# Patient Record
Sex: FEMALE | Employment: OTHER | URBAN - METROPOLITAN AREA
[De-identification: names, ages, dates, MRNs, and addresses within clinical notes are randomized per-mention and may not be internally consistent; named-entity substitution may affect disease eponyms.]

---

## 2017-09-13 PROCEDURE — 99283 EMERGENCY DEPT VISIT LOW MDM: CPT | Performed by: EMERGENCY MEDICINE

## 2017-09-13 PROCEDURE — 81003 URINALYSIS AUTO W/O SCOPE: CPT | Performed by: EMERGENCY MEDICINE

## 2017-09-14 ENCOUNTER — HOSPITAL ENCOUNTER (EMERGENCY)
Age: 77
Discharge: HOME | End: 2017-09-14
Attending: EMERGENCY MEDICINE
Payer: MEDICARE

## 2017-09-14 VITALS
HEART RATE: 110 BPM | TEMPERATURE: 98.1 F | RESPIRATION RATE: 18 BRPM | WEIGHT: 220 LBS | DIASTOLIC BLOOD PRESSURE: 73 MMHG | HEIGHT: 64 IN | SYSTOLIC BLOOD PRESSURE: 158 MMHG | OXYGEN SATURATION: 97 % | BODY MASS INDEX: 37.56 KG/M2

## 2017-09-14 DIAGNOSIS — N30.01 ACUTE CYSTITIS WITH HEMATURIA: Primary | ICD-10-CM

## 2017-09-14 LAB
AMORPH CRY URNS QL MICRO: ABNORMAL
APPEARANCE UR: ABNORMAL
BACTERIA URNS QL MICRO: ABNORMAL /HPF
BILIRUB UR QL: ABNORMAL
COLOR UR: ABNORMAL
EPI CELLS #/AREA URNS HPF: ABNORMAL /HPF
GLUCOSE UR STRIP.AUTO-MCNC: NEGATIVE MG/DL
HGB UR QL STRIP: NEGATIVE
KETONES UR QL STRIP.AUTO: 80 MG/DL
LEUKOCYTE ESTERASE UR QL STRIP.AUTO: ABNORMAL
NITRITE UR QL STRIP.AUTO: POSITIVE
PH UR STRIP: 5 [PH] (ref 5–9)
PROT UR STRIP-MCNC: 30 MG/DL
RBC #/AREA URNS HPF: ABNORMAL /HPF
SP GR UR REFRACTOMETRY: 1.03 (ref 1–1.02)
UROBILINOGEN UR QL STRIP.AUTO: 4 EU/DL (ref 0.2–1)
WBC URNS QL MICRO: ABNORMAL /HPF

## 2017-09-14 PROCEDURE — 74011250637 HC RX REV CODE- 250/637: Performed by: EMERGENCY MEDICINE

## 2017-09-14 RX ORDER — PROMETHAZINE HYDROCHLORIDE 25 MG/1
25 TABLET ORAL
Qty: 12 TAB | Refills: 0 | Status: SHIPPED | OUTPATIENT
Start: 2017-09-14 | End: 2017-11-10

## 2017-09-14 RX ORDER — CEPHALEXIN 500 MG/1
500 CAPSULE ORAL
Status: COMPLETED | OUTPATIENT
Start: 2017-09-14 | End: 2017-09-14

## 2017-09-14 RX ORDER — CEPHALEXIN 500 MG/1
500 CAPSULE ORAL 3 TIMES DAILY
Qty: 21 CAP | Refills: 0 | Status: SHIPPED | OUTPATIENT
Start: 2017-09-14 | End: 2017-10-10

## 2017-09-14 RX ADMIN — CEPHALEXIN 500 MG: 500 CAPSULE ORAL at 02:07

## 2017-09-14 NOTE — ED PROVIDER NOTES
HPI Comments: Patient states she has been having lower abdominal pain for the past couple weeks. She's had some mild nausea with no vomiting, denies any fever. .  Her pain is worse when she urinates and she \"feels a pulling down there\". She denies any hematuria. She has been taking  Some over-the-counter Azo without any improvement in her symptoms. She saw her primary doctor who did not feel that she had an infection. Elements of this note were created using speech recognition software. As such, errors of speech recognition may be present. Patient is a 68 y.o. female presenting with abdominal pain. The history is provided by the patient. Abdominal Pain    Associated symptoms include nausea. Pertinent negatives include no fever and no vomiting. Past Medical History:   Diagnosis Date    Anxiety state, unspecified     GERD (gastroesophageal reflux disease)     Hypercholesterolemia        Past Surgical History:   Procedure Laterality Date    HX CHOLECYSTECTOMY      HX COLONOSCOPY      Dr Lyssa Dawson 2012, repeat 5 years    HX GYN      hysterectomy, partial    HX KNEE REPLACEMENT      right         Family History:   Problem Relation Age of Onset    Diabetes Mother     Heart Disease Mother     Thyroid Disease Mother     Heart Disease Father        Social History     Social History    Marital status:      Spouse name: N/A    Number of children: N/A    Years of education: N/A     Occupational History    Not on file. Social History Main Topics    Smoking status: Former Smoker     Quit date: 1/1/1975    Smokeless tobacco: Never Used    Alcohol use No    Drug use: No    Sexual activity: Not on file     Other Topics Concern    Not on file     Social History Narrative         ALLERGIES: Morphine    Review of Systems   Constitutional: Negative for chills and fever. Gastrointestinal: Positive for abdominal pain and nausea. Negative for vomiting.    All other systems reviewed and are negative. Vitals:    09/13/17 2318   BP: 156/81   Pulse: 97   Temp: 98 °F (36.7 °C)   SpO2: 96%   Weight: 99.8 kg (220 lb)   Height: 5' 4\" (1.626 m)            Physical Exam   Constitutional: She is oriented to person, place, and time. She appears well-developed and well-nourished. HENT:   Head: Normocephalic and atraumatic. Eyes: Conjunctivae are normal. Pupils are equal, round, and reactive to light. Neck: Normal range of motion. Neck supple. Cardiovascular: Normal rate and regular rhythm. Pulmonary/Chest: Effort normal and breath sounds normal.   Abdominal: Soft. Bowel sounds are normal. There is tenderness. Mild tenderness to palpation lower abdomen as indicated   Musculoskeletal: She exhibits no edema or tenderness. Neurological: She is alert and oriented to person, place, and time. Skin: Skin is warm and dry. Psychiatric: She has a normal mood and affect. Her behavior is normal.   Nursing note and vitals reviewed.        MDM  Number of Diagnoses or Management Options  Acute cystitis with hematuria: new and does not require workup  Diagnosis management comments: Differential sinuses: UTI, pyelonephritis, renal colic  3:52 AM discussed results with patient, need for antibiotics       Amount and/or Complexity of Data Reviewed  Clinical lab tests: ordered and reviewed    Risk of Complications, Morbidity, and/or Mortality  Presenting problems: moderate  Diagnostic procedures: moderate  Management options: moderate    Patient Progress  Patient progress: improved    ED Course       Procedures

## 2017-09-14 NOTE — DISCHARGE INSTRUCTIONS
Urinary Tract Infection in Women: Care Instructions  Your Care Instructions    A urinary tract infection, or UTI, is a general term for an infection anywhere between the kidneys and the urethra (where urine comes out). Most UTIs are bladder infections. They often cause pain or burning when you urinate. UTIs are caused by bacteria and can be cured with antibiotics. Be sure to complete your treatment so that the infection goes away. Follow-up care is a key part of your treatment and safety. Be sure to make and go to all appointments, and call your doctor if you are having problems. It's also a good idea to know your test results and keep a list of the medicines you take. How can you care for yourself at home? · Take your antibiotics as directed. Do not stop taking them just because you feel better. You need to take the full course of antibiotics. · Drink extra water and other fluids for the next day or two. This may help wash out the bacteria that are causing the infection. (If you have kidney, heart, or liver disease and have to limit fluids, talk with your doctor before you increase your fluid intake.)  · Avoid drinks that are carbonated or have caffeine. They can irritate the bladder. · Urinate often. Try to empty your bladder each time. · To relieve pain, take a hot bath or lay a heating pad set on low over your lower belly or genital area. Never go to sleep with a heating pad in place. To prevent UTIs  · Drink plenty of water each day. This helps you urinate often, which clears bacteria from your system. (If you have kidney, heart, or liver disease and have to limit fluids, talk with your doctor before you increase your fluid intake.)  · Urinate when you need to. · Urinate right after you have sex. · Change sanitary pads often. · Avoid douches, bubble baths, feminine hygiene sprays, and other feminine hygiene products that have deodorants.   · After going to the bathroom, wipe from front to back.  When should you call for help? Call your doctor now or seek immediate medical care if:  · Symptoms such as fever, chills, nausea, or vomiting get worse or appear for the first time. · You have new pain in your back just below your rib cage. This is called flank pain. · There is new blood or pus in your urine. · You have any problems with your antibiotic medicine. Watch closely for changes in your health, and be sure to contact your doctor if:  · You are not getting better after taking an antibiotic for 2 days. · Your symptoms go away but then come back. Where can you learn more? Go to http://dharmesh-lisbeth.info/. Enter D151 in the search box to learn more about \"Urinary Tract Infection in Women: Care Instructions. \"  Current as of: November 28, 2016  Content Version: 11.3  © 1385-3433 [employer], [employer]. Care instructions adapted under license by [employer] (which disclaims liability or warranty for this information). If you have questions about a medical condition or this instruction, always ask your healthcare professional. [address] any warranty or liability for your use of this information.

## 2017-09-15 ENCOUNTER — PATIENT OUTREACH (OUTPATIENT)
Dept: CASE MANAGEMENT | Age: 77
End: 2017-09-15

## 2017-09-15 NOTE — PROGRESS NOTES
Initial outreach attempt to patient was unsuccessful. Second outreach attempt will be made within 24 hours. This note will not be viewable in [address]

## 2017-09-18 ENCOUNTER — APPOINTMENT (OUTPATIENT)
Dept: CT IMAGING | Age: 77
DRG: 330 | End: 2017-09-18
Payer: MEDICARE

## 2017-09-18 ENCOUNTER — HOSPITAL ENCOUNTER (EMERGENCY)
Age: 77
Discharge: HOME | DRG: 330 | End: 2017-09-18
Attending: EMERGENCY MEDICINE
Payer: MEDICARE

## 2017-09-18 ENCOUNTER — HOSPITAL ENCOUNTER (INPATIENT)
Age: 77
LOS: 22 days | Discharge: HOME HEALTH | DRG: 330 | End: 2017-10-10
Attending: SURGERY | Admitting: SURGERY
Payer: MEDICARE

## 2017-09-18 VITALS
RESPIRATION RATE: 16 BRPM | HEART RATE: 78 BPM | BODY MASS INDEX: 37.56 KG/M2 | SYSTOLIC BLOOD PRESSURE: 137 MMHG | WEIGHT: 220 LBS | HEIGHT: 64 IN | DIASTOLIC BLOOD PRESSURE: 68 MMHG | TEMPERATURE: 98.1 F | OXYGEN SATURATION: 95 %

## 2017-09-18 DIAGNOSIS — F32.A DEPRESSION, UNSPECIFIED DEPRESSION TYPE: ICD-10-CM

## 2017-09-18 DIAGNOSIS — R19.00 ABDOMINAL MASS, UNSPECIFIED LOCATION: Primary | ICD-10-CM

## 2017-09-18 PROBLEM — B99.9 INTRA-ABDOMINAL INFECTION: Status: ACTIVE | Noted: 2017-09-18

## 2017-09-18 LAB
ALBUMIN SERPL-MCNC: 3 G/DL (ref 3.2–4.6)
ALBUMIN/GLOB SERPL: 0.7 {RATIO} (ref 1.2–3.5)
ALP SERPL-CCNC: 76 U/L (ref 50–136)
ALT SERPL-CCNC: 15 U/L (ref 12–65)
ANION GAP SERPL CALC-SCNC: 4 MMOL/L (ref 7–16)
AST SERPL-CCNC: 10 U/L (ref 15–37)
BACTERIA URNS QL MICRO: NORMAL /HPF
BASOPHILS # BLD: 0 K/UL (ref 0–0.2)
BASOPHILS NFR BLD: 0 % (ref 0–2)
BILIRUB SERPL-MCNC: 0.3 MG/DL (ref 0.2–1.1)
BUN SERPL-MCNC: 9 MG/DL (ref 8–23)
CALCIUM SERPL-MCNC: 8.5 MG/DL (ref 8.3–10.4)
CANCER AG125 SERPL-ACNC: 5 U/ML (ref 1.5–35)
CASTS URNS QL MICRO: 0 /LPF
CEA SERPL-MCNC: 0.8 NG/ML (ref 0–3)
CHLORIDE SERPL-SCNC: 102 MMOL/L (ref 98–107)
CO2 SERPL-SCNC: 30 MMOL/L (ref 21–32)
CREAT SERPL-MCNC: 0.79 MG/DL (ref 0.6–1)
CRYSTALS URNS QL MICRO: 0 /LPF
DIFFERENTIAL METHOD BLD: ABNORMAL
EOSINOPHIL # BLD: 0.1 K/UL (ref 0–0.8)
EOSINOPHIL NFR BLD: 1 % (ref 0.5–7.8)
EPI CELLS #/AREA URNS HPF: NORMAL /HPF
ERYTHROCYTE [DISTWIDTH] IN BLOOD BY AUTOMATED COUNT: 16.1 % (ref 11.9–14.6)
GLOBULIN SER CALC-MCNC: 4.2 G/DL (ref 2.3–3.5)
GLUCOSE SERPL-MCNC: 113 MG/DL (ref 65–100)
HCT VFR BLD AUTO: 39.6 % (ref 35.8–46.3)
HGB BLD-MCNC: 11.9 G/DL (ref 11.7–15.4)
IMM GRANULOCYTES # BLD: 0 K/UL (ref 0–0.5)
IMM GRANULOCYTES NFR BLD: 0.2 % (ref 0–5)
LYMPHOCYTES # BLD: 1.9 K/UL (ref 0.5–4.6)
LYMPHOCYTES NFR BLD: 15 % (ref 13–44)
MCH RBC QN AUTO: 24.7 PG (ref 26.1–32.9)
MCHC RBC AUTO-ENTMCNC: 30.1 G/DL (ref 31.4–35)
MCV RBC AUTO: 82.3 FL (ref 79.6–97.8)
MONOCYTES # BLD: 0.7 K/UL (ref 0.1–1.3)
MONOCYTES NFR BLD: 6 % (ref 4–12)
MUCOUS THREADS URNS QL MICRO: 0 /LPF
NEUTS SEG # BLD: 9.7 K/UL (ref 1.7–8.2)
NEUTS SEG NFR BLD: 78 % (ref 43–78)
PLATELET # BLD AUTO: 389 K/UL (ref 150–450)
PMV BLD AUTO: 9.8 FL (ref 10.8–14.1)
POTASSIUM SERPL-SCNC: 4.1 MMOL/L (ref 3.5–5.1)
PROT SERPL-MCNC: 7.2 G/DL (ref 6.3–8.2)
RBC # BLD AUTO: 4.81 M/UL (ref 4.05–5.25)
RBC #/AREA URNS HPF: 0 /HPF
SODIUM SERPL-SCNC: 136 MMOL/L (ref 136–145)
WBC # BLD AUTO: 12.4 K/UL (ref 4.3–11.1)
WBC URNS QL MICRO: NORMAL /HPF

## 2017-09-18 PROCEDURE — 74011250637 HC RX REV CODE- 250/637: Performed by: SURGERY

## 2017-09-18 PROCEDURE — 65270000029 HC RM PRIVATE

## 2017-09-18 PROCEDURE — 74011000258 HC RX REV CODE- 258: Performed by: SURGERY

## 2017-09-18 PROCEDURE — 74011000250 HC RX REV CODE- 250: Performed by: SURGERY

## 2017-09-18 PROCEDURE — 74011250636 HC RX REV CODE- 250/636: Performed by: SURGERY

## 2017-09-18 RX ORDER — FAMOTIDINE 10 MG/ML
20 INJECTION INTRAVENOUS EVERY 12 HOURS
Status: DISCONTINUED | OUTPATIENT
Start: 2017-09-18 | End: 2017-09-20

## 2017-09-18 RX ORDER — HYDROMORPHONE HYDROCHLORIDE 1 MG/ML
0.5 INJECTION, SOLUTION INTRAMUSCULAR; INTRAVENOUS; SUBCUTANEOUS
Status: COMPLETED | OUTPATIENT
Start: 2017-09-18 | End: 2017-09-18

## 2017-09-18 RX ORDER — ACETAMINOPHEN 500 MG
1000 TABLET ORAL
Status: DISCONTINUED | OUTPATIENT
Start: 2017-09-18 | End: 2017-10-03

## 2017-09-18 RX ORDER — SODIUM CHLORIDE 0.9 % (FLUSH) 0.9 %
5-10 SYRINGE (ML) INJECTION AS NEEDED
Status: DISCONTINUED | OUTPATIENT
Start: 2017-09-18 | End: 2017-09-18 | Stop reason: HOSPADM

## 2017-09-18 RX ORDER — ONDANSETRON 2 MG/ML
4 INJECTION INTRAMUSCULAR; INTRAVENOUS
Status: COMPLETED | OUTPATIENT
Start: 2017-09-18 | End: 2017-09-18

## 2017-09-18 RX ORDER — OXYCODONE HYDROCHLORIDE 5 MG/1
5 TABLET ORAL
Status: DISCONTINUED | OUTPATIENT
Start: 2017-09-18 | End: 2017-10-03

## 2017-09-18 RX ORDER — DEXTROSE, SODIUM CHLORIDE, AND POTASSIUM CHLORIDE 5; .45; .15 G/100ML; G/100ML; G/100ML
110 INJECTION INTRAVENOUS CONTINUOUS
Status: DISCONTINUED | OUTPATIENT
Start: 2017-09-18 | End: 2017-09-20

## 2017-09-18 RX ORDER — OXYCODONE HYDROCHLORIDE 5 MG/1
5-10 TABLET ORAL
Status: DISCONTINUED | OUTPATIENT
Start: 2017-09-18 | End: 2017-09-18 | Stop reason: SDUPTHER

## 2017-09-18 RX ORDER — SODIUM CHLORIDE 0.9 % (FLUSH) 0.9 %
10 SYRINGE (ML) INJECTION
Status: COMPLETED | OUTPATIENT
Start: 2017-09-18 | End: 2017-09-18

## 2017-09-18 RX ORDER — SODIUM CHLORIDE 0.9 % (FLUSH) 0.9 %
5-10 SYRINGE (ML) INJECTION EVERY 8 HOURS
Status: DISCONTINUED | OUTPATIENT
Start: 2017-09-18 | End: 2017-09-18 | Stop reason: HOSPADM

## 2017-09-18 RX ORDER — SODIUM CHLORIDE 9 MG/ML
125 INJECTION, SOLUTION INTRAVENOUS CONTINUOUS
Status: DISCONTINUED | OUTPATIENT
Start: 2017-09-18 | End: 2017-09-18 | Stop reason: HOSPADM

## 2017-09-18 RX ORDER — ENOXAPARIN SODIUM 100 MG/ML
40 INJECTION SUBCUTANEOUS EVERY 24 HOURS
Status: DISCONTINUED | OUTPATIENT
Start: 2017-09-19 | End: 2017-09-21

## 2017-09-18 RX ORDER — OXYCODONE HYDROCHLORIDE 5 MG/1
10 TABLET ORAL
Status: DISCONTINUED | OUTPATIENT
Start: 2017-09-18 | End: 2017-10-03

## 2017-09-18 RX ADMIN — SODIUM CHLORIDE 100 ML: 900 INJECTION, SOLUTION INTRAVENOUS at 13:09

## 2017-09-18 RX ADMIN — ONDANSETRON 4 MG: 2 INJECTION INTRAMUSCULAR; INTRAVENOUS at 11:49

## 2017-09-18 RX ADMIN — CEFTRIAXONE 1 G: 1 INJECTION, POWDER, FOR SOLUTION INTRAMUSCULAR; INTRAVENOUS at 12:34

## 2017-09-18 RX ADMIN — DEXTROSE MONOHYDRATE, SODIUM CHLORIDE, AND POTASSIUM CHLORIDE 110 ML/HR: 50; 4.5; 1.49 INJECTION, SOLUTION INTRAVENOUS at 19:00

## 2017-09-18 RX ADMIN — HYDROMORPHONE HYDROCHLORIDE 0.5 MG: 1 INJECTION, SOLUTION INTRAMUSCULAR; INTRAVENOUS; SUBCUTANEOUS at 12:34

## 2017-09-18 RX ADMIN — OXYCODONE HYDROCHLORIDE 5 MG: 5 TABLET ORAL at 19:55

## 2017-09-18 RX ADMIN — PIPERACILLIN SODIUM,TAZOBACTAM SODIUM 3.38 G: 3; .375 INJECTION, POWDER, FOR SOLUTION INTRAVENOUS at 23:23

## 2017-09-18 RX ADMIN — PIPERACILLIN SODIUM,TAZOBACTAM SODIUM 4.5 G: 4; .5 INJECTION, POWDER, FOR SOLUTION INTRAVENOUS at 15:05

## 2017-09-18 RX ADMIN — SODIUM CHLORIDE 1000 ML: 900 INJECTION, SOLUTION INTRAVENOUS at 11:49

## 2017-09-18 RX ADMIN — SODIUM CHLORIDE 125 ML/HR: 900 INJECTION, SOLUTION INTRAVENOUS at 15:57

## 2017-09-18 RX ADMIN — FAMOTIDINE 20 MG: 10 INJECTION, SOLUTION INTRAVENOUS at 21:30

## 2017-09-18 RX ADMIN — IOPAMIDOL 100 ML: 755 INJECTION, SOLUTION INTRAVENOUS at 13:09

## 2017-09-18 RX ADMIN — Medication 10 ML: at 13:09

## 2017-09-18 NOTE — PROGRESS NOTES
TRANSFER - IN REPORT:    Verbal report received from [facility] RN(name) on Manju Barrios  being received from [facility]) for routine progression of care      Report consisted of patients Situation, Background, Assessment and   Recommendations(SBAR). Information from the following report(s) Kardex was reviewed with the receiving nurse. Opportunity for questions and clarification was provided. Assessment completed upon patients arrival to unit and care assumed. Oriented to nurse call light system and changed into hospital gown. Instructed to call with needs.

## 2017-09-18 NOTE — H&P
H&P/Consult Note/Progress Note/Office Note:   James Sellers  MRN: [id number]  :1940  Age:76 y.o.    HPI: James Sellers is a 68 y.o. female who reports a 1 month h/o RLQ pain. This has been worsening and is associated with intermittent N/V. It is moderate in intensity  Nothing makes it better or worse. She was treated previously with laxatives without imp[rovement. She came the [facility] ER about 5 days ago and was treated with antibiotics for UTI. No imaging was done. She came back to the ER today and CT obtained as shown below. She is s/p lap robbi at [facility] approx . She is s/p hysterectomy about 40 yrs ago for benign fibroids. She thinks her ovaries remain. She denies bloody BMs or diarrhea.          17 CT abd/pelvis with contrast with IV but not oral contrast   Minimal linear atelectasis or scarring is present in the lung bases,  left greater than right. There is a small to moderate-sized hiatal hernia. No  pleural or pericardial fluid.     Small hypoattenuating lesions are present in the right hepatic lobe and left  kidney, too small to definitively characterize but statistically likely  representing cysts. There is central biliary ductal ectasia status post  cholecystectomy.     The remaining solid abdominal viscera are unremarkable to the limits of  noncontrast CT. Atherosclerotic abdominal aorta and branch vessels. No upper  abdominal no retroperitoneal lymph node enlargement. Nonopacified small bowel  loops are normal in caliber.     Pelvis: There is somewhat prominent lipomatosis along the left posterior  pararenal fascia and iliopsoas without associated soft tissue component,  measuring up to 12.0 x 23 x 9 cm. The bladder is predominantly decompressed. Uterus not visualized.     Extensive diverticular changes are present in the colon which is otherwise  normal in caliber.  There is a large ill-defined inflammatory process in the  right lower quadrant measuring 5.9 x 10.0 x 6.4 cm approximating the ileocecal  junction from which the appendix is not visualized as a distinctly separate  structure. Multiple high attenuation foci are present within or adjacent to the  bowel, suspicious for medication and/or enteroliths. There is moderate adjacent  inflammatory fat stranding and multiple prominent right lower quadrant  mesenteric lymph nodes, the largest of which measures 1.2 cm in short axis.     Trace dependent free pelvic fluid. No aggressive osseous lesions.     IMPRESSION:     1. Nonspecific inflammatory process in the right lower quadrant at the ileocecal  junction. Differential considerations include infectious, and ischemic colitis,  appendicitis, inflammatory bowel disease, omental infarct. Surgical consultation  recommended.     2. Large extraperitoneal predominantly fatty attenuation mass without definite  enhancing soft tissue. Size places the patient at risk for sarcoma, the inferior  extent of which is not included in the field of view.                 Past Medical History:   Diagnosis Date    Anxiety state, unspecified     GERD (gastroesophageal reflux disease)     Hypercholesterolemia      Past Surgical History:   Procedure Laterality Date    HX CHOLECYSTECTOMY      HX COLONOSCOPY      Dr Lis Mg 2012, repeat 5 years    HX GYN      hysterectomy, partial    HX KNEE REPLACEMENT      right     Current Facility-Administered Medications   Medication Dose Route Frequency    sodium chloride (NS) flush 5-10 mL  5-10 mL IntraVENous Q8H    sodium chloride (NS) flush 5-10 mL  5-10 mL IntraVENous PRN     Current Outpatient Prescriptions   Medication Sig    cephALEXin (KEFLEX) 500 mg capsule Take 1 Cap by mouth three (3) times daily for 7 days.  promethazine (PHENERGAN) 25 mg tablet Take 1 Tab by mouth every six (6) hours as needed for up to 15 doses.  pravastatin (PRAVACHOL) 40 mg tablet Take 1 Tab by mouth nightly.     omeprazole (PRILOSEC) 40 mg capsule 1 po q day    citalopram (CELEXA) 10 mg tablet Take 1 Tab by mouth daily.  fluticasone (FLONASE) 50 mcg/actuation nasal spray 2 Sprays by Both Nostrils route daily. Morphine  Social History     Social History    Marital status:      Spouse name: N/A    Number of children: N/A    Years of education: N/A     Social History Main Topics    Smoking status: Former Smoker     Quit date: 1/1/1975    Smokeless tobacco: Never Used    Alcohol use No    Drug use: No    Sexual activity: Not Asked     Other Topics Concern    None     Social History Narrative     History   Smoking Status    Former Smoker    Quit date: 1/1/1975   Smokeless Tobacco    Never Used     Family History   Problem Relation Age of Onset    Diabetes Mother     Heart Disease Mother     Thyroid Disease Mother     Heart Disease Father      ROS: The patient has no difficulty with chest pain or shortness of breath. No fever or chills. Comprehensive review of systems was otherwise unremarkable except as noted above. Physical Exam:   Visit Vitals    /68    Pulse 78    Temp 98.1 °F (36.7 °C)    Resp 16    Ht 5' 4\" (1.626 m)    Wt 99.8 kg (220 lb)    SpO2 95%    BMI 37.76 kg/m2     Constitutional: Alert, oriented, cooperative patient in no acute distress; appears stated age    Eyes:Sclera are clear. EOMs intact  ENMT: no external lesions gross hearing normal; no obvious neck masses, no ear or lip lesions, nares normal  CV: RRR. Normal perfusion  Resp: No JVD. Breathing is  non-labored; no audible wheezing. GI: soft and non-distended; obese; fullness/mass-effect in RLQ; tender to deeper palpation; no guarding    Musculoskeletal: unremarkable with normal function. No embolic signs or cyanosis.    Neuro:  Oriented; moves all 4; no focal deficits  Psychiatric: normal affect and mood, no memory impairment    Recent vitals (if inpt):  Patient Vitals for the past 24 hrs:   BP Temp Pulse Resp SpO2 Height Weight   09/18/17 1510 137/68 98.1 °F (36.7 °C) 78 - 95 % - -   09/18/17 1321 164/73 - - - 93 % - -   09/18/17 1239 133/64 - - - 96 % - -   09/18/17 1046 110/65 97.9 °F (36.6 °C) 82 16 94 % 5' 4\" (1.626 m) 99.8 kg (220 lb)       Labs:  Recent Labs      09/18/17   1142   WBC  12.4*   HGB  11.9   PLT  389   NA  136   K  4.1   CL  102   CO2  30   BUN  9   CREA  0.79   GLU  113*   TBILI  0.3   SGOT  10*   ALT  15   AP  76       Lab Results   Component Value Date/Time    WBC 12.4 09/18/2017 11:42 AM    HGB 11.9 09/18/2017 11:42 AM    PLATELET 353 94/23/3699 11:42 AM    Sodium 136 09/18/2017 11:42 AM    Potassium 4.1 09/18/2017 11:42 AM    Chloride 102 09/18/2017 11:42 AM    CO2 30 09/18/2017 11:42 AM    BUN 9 09/18/2017 11:42 AM    Creatinine 0.79 09/18/2017 11:42 AM    Glucose 113 09/18/2017 11:42 AM    Bilirubin, total 0.3 09/18/2017 11:42 AM    AST (SGOT) 10 09/18/2017 11:42 AM    ALT (SGPT) 15 09/18/2017 11:42 AM    Alk. phosphatase 76 09/18/2017 11:42 AM       I reviewed recent labs and recent radiologic studies. I independently reviewed radiology images for studies I described above or studies I have ordered. Admission date (for inpatients): 9/18/2017   * No surgery found *  * No surgery found *    ASSESSMENT/PLAN:  Problem List  Date Reviewed: 9/5/2017          Codes Class Noted    Anxiety state, unspecified ICD-10-CM: F41.1  ICD-9-CM: 300.00  Unknown        GERD (gastroesophageal reflux disease) ICD-10-CM: K21.9  ICD-9-CM: 530.81  Unknown        Hypercholesterolemia ICD-10-CM: E78.00  ICD-9-CM: 272.0  Unknown            Active Problems:    * No active hospital problems. *       I saw her in the ER at [address] to [facility]  She may need IR drainage on follow-up imaging if abscess develops or possible perc biopsy.   NPO  IVF  IV Pepcid  IV Abx  PICC/TPN soon    Likely repeat CT in 5-7 days      Signed:  Remigio Mccoy MD,  FACS

## 2017-09-18 NOTE — ED NOTES
Received report from Feliz Gamez, [facility]. Pt up to the bathroom at this time.

## 2017-09-18 NOTE — ED NOTES
TRANSFER - OUT REPORT:    Verbal report given to MAK Medel on [facility]  being transferred to On license of [facility] for routine progression of care       Report consisted of patients Situation, Background, Assessment and   Recommendations(SBAR). Information from the following report(s) ED Summary, MAR, Recent Results and Med Rec Status was reviewed with the receiving nurse. Lines:       Opportunity for questions and clarification was provided. Patient transported with:  IV NS infusing via pump at 125 ml/hr.

## 2017-09-18 NOTE — IP AVS SNAPSHOT
[address] [facility] 
[phone] Patient: Amparo Gupta 
MRN: [id number] You are allergic to the following Allergen Reactions Morphine Nausea and Vomiting Immunizations Administered for This Admission Name Date  
 TB Skin Test (PPD) Intradermal  Deferred (),  Deferred (), 9/28/2017 Recent Documentation Height Weight BMI Smoking Status 1.626 m 110.7 kg 41.88 kg/m2 Former Smoker Unresulted Labs Order Current Status CULTURE, BLOOD Preliminary result CULTURE, BLOOD Preliminary result Emergency Contacts Name Discharge Info Relation Home Work Mobile Capellan,Kendal DISCHARGE CAREGIVER [3] Child [2] [phone] About your hospitalization You were admitted on:  September 18, 2017 You last received care in the:  [facility] 2 SURGICAL You were discharged on:  October 10, 2017 Unit phone number:  [phone] Why you were hospitalized Your primary diagnosis was: Intra-Abdominal Infection Your diagnoses also included:  Acute Cystitis, Rlq Abdominal Mass, Rlq Abdominal Pain, Leukocytosis, Non-Intractable Vomiting With Nausea, Retroperitoneal Mass, Phlegmon, Localized Peritonitis (Hcc), Mass Of Cecum Providers Seen During Your Hospitalizations Provider Role Specialty Primary office phone Angie Beltrán MD Attending Provider General Surgery [phone] Your Primary Care Physician (PCP) Primary Care Physician Office Phone Office Fax [phone] [address] [phone] Follow-up Information Follow up With Details Comments Contact Info Ayush Mathew MD   [facility] [phone]  Shayy Liu 
[phone] Angie Beltrán MD On 10/19/2017 2:[address] 
[place] [address] [facility] [address] [phone] Your Appointments Thursday October 19, 2017  2:00 PM EDT Global Post Op with Ananya Bunn MD  
[facility] ([facility]) [address] [place] [address] [facility]  
[phone] Current Discharge Medication List  
  
START taking these medications Dose & Instructions Dispensing Information Comments Morning Noon Evening Bedtime  
 ciprofloxacin HCl 500 mg tablet Commonly known as:  CIPRO Dose:  500 mg Take 1 Tab by mouth two (2) times a day. Quantity:  20 Tab Refills:  0  
     
  
   
   
  
   
  
 fluconazole 150 mg tablet Commonly known as:  DIFLUCAN Dose:  150 mg Take 1 Tab by mouth daily for 7 days. FDA advises cautious prescribing of oral fluconazole in pregnancy. Quantity:  7 Tab Refills:  0 CONTINUE these medications which have NOT CHANGED Dose & Instructions Dispensing Information Comments Morning Noon Evening Bedtime  
 citalopram 10 mg tablet Commonly known as:  Emelyn Frieze Dose:  10 mg Take 1 Tab by mouth daily. Quantity:  90 Tab Refills:  1 HOLD THIS DRUG WHILE ON CIPROFLOXACIN  
    
  
   
   
   
  
 fluticasone 50 mcg/actuation nasal spray Commonly known as:  Montine Jessee Dose:  2 Spray 2 Sprays by Both Nostrils route daily. Quantity:  1 Bottle Refills:  5  
     
  
   
   
   
  
 omeprazole 40 mg capsule Commonly known as:  PRILOSEC 1 po q day Quantity:  90 Cap Refills:  1  
     
  
   
   
   
  
 pravastatin 40 mg tablet Commonly known as:  PRAVACHOL Dose:  40 mg Take 1 Tab by mouth nightly. Quantity:  90 Tab Refills:  1  
     
   
   
   
  
  
 promethazine 25 mg tablet Commonly known as:  PHENERGAN Notes to Patient:  Take on as needed schedule Dose:  25 mg Take 1 Tab by mouth every six (6) hours as needed for up to 15 doses. Quantity:  12 Tab Refills:  0 STOP taking these medications   
 cephALEXin 500 mg capsule Commonly known as:  Guerita Salgado Where to Get Your Medications These medications were sent to [address], [address] [facility] AT [address] [address] Phone:  [phone]  
  ciprofloxacin HCl 500 mg tablet  
 citalopram 10 mg tablet  
 fluconazole 150 mg tablet Discharge Instructions Empty the STELLA drain as needed. Replace the dry gauze around the drain exit site as needed. Cover the midline incision with dry gauze and tape daily Keep incision and drain exit site dry to lower risk of infection No heavy lifting (>5lbs) for 6 weeks to reduce risk of developing a hernia in the incisions. Pain prescription (Percocet) on chart for patient to use as needed for pain Antibiotic Prescriptions (Cipro and Flagyl) on chart for outpt use also Follow-up with Dr Narendra Che in 8 days on a Thursday in the office at: 
[facility] [address] [facility], [address] 
(Call for an appt time-->[phone]-->option 2) DISCHARGE SUMMARY from Nurse The following personal items are in your possession at time of discharge: 
 
Dental Appliances: None Visual Aid: Glasses Home Medications: None Jewelry: None Clothing: None Other Valuables: None PATIENT INSTRUCTIONS: 
 
After general anesthesia or intravenous sedation, for 24 hours or while taking prescription Narcotics: · Limit your activities · Do not drive and operate hazardous machinery · Do not make important personal or business decisions · Do  not drink alcoholic beverages · If you have not urinated within 8 hours after discharge, please contact your surgeon on call. Report the following to your surgeon: 
· Excessive pain, swelling, redness or odor of or around the surgical area · Temperature over 100.5 · Nausea and vomiting lasting longer than 4 hours or if unable to take medications · Any signs of decreased circulation or nerve impairment to extremity: change in color, persistent  numbness, tingling, coldness or increase pain · Any questions What to do at Home: 
Recommended activity: Activity as tolerated, per MD instructions If you experience any of the following symptoms fever > 100.5, nausea, vomiting, pain, chest pain, shortness of breath please follow up with MD. 
 
 
*  Please give a list of your current medications to your Primary Care Provider. *  Please update this list whenever your medications are discontinued, doses are 
    changed, or new medications (including over-the-counter products) are added. *  Please carry medication information at all times in case of emergency situations. These are general instructions for a healthy lifestyle: No smoking/ No tobacco products/ Avoid exposure to second hand smoke Surgeon General's Warning:  Quitting smoking now greatly reduces serious risk to your health. Obesity, smoking, and sedentary lifestyle greatly increases your risk for illness A healthy diet, regular physical exercise & weight monitoring are important for maintaining a healthy lifestyle You may be retaining fluid if you have a history of heart failure or if you experience any of the following symptoms:  Weight gain of 3 pounds or more overnight or 5 pounds in a week, increased swelling in our hands or feet or shortness of breath while lying flat in bed. Please call your doctor as soon as you notice any of these symptoms; do not wait until your next office visit. Recognize signs and symptoms of STROKE: 
 
F-face looks uneven A-arms unable to move or move unevenly S-speech slurred or non-existent T-time-call 911 as soon as signs and symptoms begin-DO NOT go Back to bed or wait to see if you get better-TIME IS BRAIN. Warning Signs of HEART ATTACK Call 911 if you have these symptoms: 
? Chest discomfort. Most heart attacks involve discomfort in the center of the chest that lasts more than a few minutes, or that goes away and comes back. It can feel like uncomfortable pressure, squeezing, fullness, or pain. ? Discomfort in other areas of the upper body. Symptoms can include pain or discomfort in one or both arms, the back, neck, jaw, or stomach. ? Shortness of breath with or without chest discomfort. ? Other signs may include breaking out in a cold sweat, nausea, or lightheadedness. Don't wait more than five minutes to call [address]! Fast action can save your life. Calling 911 is almost always the fastest way to get lifesaving treatment. Emergency Medical Services staff can begin treatment when they arrive  up to an hour sooner than if someone gets to the hospital by car. The discharge information has been reviewed with the patient. The patient verbalized understanding. Discharge medications reviewed with the patient and appropriate educational materials and side effects teaching were provided. Surgery: What to Expect at [facility] Your Recovery This care sheet gives you a general idea about how long it will take for you to recover from your surgery. But each person recovers at a different pace. How can you care for yourself at home? Activity · Allow your body to heal. Don't move quickly or lift anything heavy until you are feeling better. · Rest when you feel tired. · Your doctor may give you specific instructions on when you can do your normal activities again, such as driving and going back to work. · Be active. Walking is a good choice. Diet · You can eat your normal diet when you feel well. If your stomach is upset, try bland, low-fat foods like plain rice, broiled chicken, toast, and yogurt.  
· If your bowel movements are not regular right after surgery, try to avoid constipation and straining. Drink plenty of water. Your doctor may suggest fiber, a stool softener, or a mild laxative. Medicines · Your doctor will tell you if and when you can restart your medicines. He or she will also give you instructions about taking any new medicines. · If you take blood thinners, such as warfarin (Coumadin), clopidogrel (Plavix), or aspirin, be sure to talk to your doctor. He or she will tell you if and when to start taking those medicines again. Make sure that you understand exactly what your doctor wants you to do. · Be safe with medicines. Read and follow all instructions on the label. ¨ If the doctor gave you a prescription medicine for pain, take it as prescribed. ¨ If you are not taking a prescription pain medicine, ask your doctor if you can take an over-the-counter medicine. Incision care If your doctor told you how to care for your cut (incision), follow your doctor's instructions. If you did not get instructions, follow this general advice: · You will have a dressing over the cut. A dressing helps the incision heal and protects it. Your doctor will tell you how to take care of this. · If you have strips of tape on the cut the doctor made, leave the tape on for a week or until it falls off. · If you had stitches, your doctor will tell you when to come back to have them removed. · If you have skin adhesive on the cut, leave it on until it falls off. Skin adhesive is also called liquid stitches. · Change the bandage every day. · Wash the area daily with warm water, and pat it dry. Don't use hydrogen peroxide or alcohol. They can slow healing. · You may cover the area with a gauze bandage if it oozes fluid or rubs against clothing. · You may shower 24 to 48 hours after surgery. Pat the incision dry. Don't swim or take a bath for the first 2 weeks, or until your doctor tells you it is okay. Follow-up care is a key part of your treatment and safety.  Be sure to make and go to all appointments, and call your doctor if you are having problems. It's also a good idea to know your test results and keep a list of the medicines you take. When should you call for help? Call 911 anytime you think you may need emergency care. For example, call if: 
· You passed out (lost consciousness). · You are short of breath. Call your doctor now or seek immediate medical care if: 
· You have pain that does not get better after you take pain medicine. · You cannot pass stool or gas. · You are sick to your stomach and cannot drink fluids. · You have loose stitches, or your incision comes open. · You have signs of a blood clot in your leg (called a deep vein thrombosis), such as: 
¨ Pain in your calf, back of the knee, thigh, or groin. ¨ Redness and swelling in your leg or groin. · You have signs of infection, such as: 
¨ Increased pain, swelling, warmth, or redness. ¨ Red streaks leading from the incision. ¨ Pus draining from the incision. ¨ A fever. · Bright red blood has soaked through your bandage. Watch closely for any changes in your health, and be sure to contact your doctor if you have any problems. Where can you learn more? Go to http://dharmesh-lisbeth.info/. Enter B570 in the search box to learn more about \"Surgery: What to Expect at Home. \" Current as of: March 24, 2017 Content Version: 11.3 © 7115-1367 [facility]. Care instructions adapted under license by [employer] (which disclaims liability or warranty for this information). If you have questions about a medical condition or this instruction, always ask your healthcare professional. [place] [address] any warranty or liability for your use of this information. Discharge Orders None ACO Transitions of Care Introducing [employer] [address] Zohreh Gaitan offers a voluntary care coordination program to provide high quality service and care to [facility] fee-for-service beneficiaries. Briana Cisse was designed to help you enhance your health and well-being through the following services: ? Transitions of Care  support for individuals who are transitioning from one care setting to another (example: Hospital to home). ? Chronic and Complex Care Coordination  support for individuals and caregivers of those with serious or chronic illnesses or with more than one chronic (ongoing) condition and those who take a number of different medications. If you meet specific medical criteria, a [address] [place] [address] Rd may call you directly to coordinate your care with your primary care physician and your other care providers. For questions about the [facility] programs, please, contact your physicians office. For general questions or additional information about Accountable Care Organizations: 
Please visit www.medicare.gov/acos. html or call 1-800-MEDICARE (5-170.728.1178) TTY users should call 9-501.557.2515. [employer] Alert Announcement We are excited to announce that we are making your provider's discharge notes available to you in [employer] Alert. You will see these notes when they are completed and signed by the physician that discharged you from your recent hospital stay. If you have any questions or concerns about any information you see in [employer], please call the Health Information Department where you were seen or reach out to your Primary Care Provider for more information about your plan of care. Introducing [facility]! [address] introduces [employer] Alert patient portal. Now you can access parts of your medical record, email your doctor's office, and request medication refills online. 1. In your internet browser, go to [email or web address] 2. Click on the First Time User? Click Here link in the Sign In box. You will see the New Member Sign Up page. 3. Enter your [employer] Mobile Access Code exactly as it appears below. You will not need to use this code after youve completed the sign-up process. If you do not sign up before the expiration date, you must request a new code. · [employer] Mobile Access Code: [email or web address] Expires: 12/4/2017  8:51 AM 
 
4. Enter the last four digits of your Social Security Number (xxxx) and Date of Birth (mm/dd/yyyy) as indicated and click Submit. You will be taken to the next sign-up page. 5. Create a [employer] Mobile ID. This will be your [employer] login ID and cannot be changed, so think of one that is secure and easy to remember. 6. Create a [employer] Mobile password. You can change your password at any time. 7. Enter your Password Reset Question and Answer. This can be used at a later time if you forget your password. 8. Enter your e-mail address. You will receive e-mail notification when new information is available in [address]. 9. Click Sign Up. You can now view and download portions of your medical record. 10. Click the Download Summary menu link to download a portable copy of your medical information. If you have questions, please visit the Frequently Asked Questions section of the [employer] website. Remember, [employer] Mobile is NOT to be used for urgent needs. For medical emergencies, dial 911. Now available from your iPhone and Android! General Information Please provide this summary of care documentation to your next provider. Patient Signature:  ____________________________________________________________ Date:  ____________________________________________________________  
  
Karyna Sleight Provider Signature:  ____________________________________________________________ Date:  ____________________________________________________________

## 2017-09-18 NOTE — ED PROVIDER NOTES
HPI Comments: Pt sen here last week for uti, started on keflex here for worsening pain, + vomited this am, no fever, no diarrhea     Patient is a 68 y.o. female presenting with flank pain. The history is provided by the patient. Flank Pain    This is a new problem. The current episode started 2 days ago. The problem has been gradually worsening. The problem occurs constantly. Patient reports not work related injury. The pain is associated with no known injury. The pain is present in the right side. The quality of the pain is described as aching. Radiates to: suprapubic area. The pain is at a severity of 7/10. The pain is mild. The pain is the same all the time. Associated symptoms include dysuria and pelvic pain. Pertinent negatives include no tingling and no weakness. Treatments tried: abx. The treatment provided mild relief. Risk factors include obesity. Past Medical History:   Diagnosis Date    Anxiety state, unspecified     GERD (gastroesophageal reflux disease)     Hypercholesterolemia        Past Surgical History:   Procedure Laterality Date    HX CHOLECYSTECTOMY      HX COLONOSCOPY      Dr Bola Mcfadden 2012, repeat 5 years    HX GYN      hysterectomy, partial    HX KNEE REPLACEMENT      right         Family History:   Problem Relation Age of Onset    Diabetes Mother     Heart Disease Mother     Thyroid Disease Mother     Heart Disease Father        Social History     Social History    Marital status:      Spouse name: N/A    Number of children: N/A    Years of education: N/A     Occupational History    Not on file. Social History Main Topics    Smoking status: Former Smoker     Quit date: 1/1/1975    Smokeless tobacco: Never Used    Alcohol use No    Drug use: No    Sexual activity: Not on file     Other Topics Concern    Not on file     Social History Narrative         ALLERGIES: Morphine    Review of Systems   Genitourinary: Positive for dysuria, flank pain and pelvic pain. Neurological: Negative for tingling and weakness. All other systems reviewed and are negative. Vitals:    09/18/17 1046   BP: 110/65   Pulse: 82   Resp: 16   Temp: 97.9 °F (36.6 °C)   SpO2: 94%   Weight: 99.8 kg (220 lb)   Height: 5' 4\" (1.626 m)            Physical Exam   Constitutional: She is oriented to person, place, and time. She appears well-developed and well-nourished. No distress. HENT:   Head: Normocephalic and atraumatic. Eyes: Conjunctivae and EOM are normal. Pupils are equal, round, and reactive to light. Neck: Normal range of motion. Neck supple. Cardiovascular: Normal rate and regular rhythm. Pulmonary/Chest: Effort normal and breath sounds normal. No respiratory distress. She has no wheezes. Abdominal: Soft. Bowel sounds are normal. There is tenderness. Pain to palpation suprapubic to rlq area, + bs   Musculoskeletal: She exhibits no edema. Neurological: She is alert and oriented to person, place, and time. Skin: Skin is warm. Nursing note and vitals reviewed.        MDM  Number of Diagnoses or Management Options  Abdominal mass, unspecified location:   Diagnosis management comments: Wbc 12.4 cmp normal  Urine wihrt few wbc  abd ct with mass to rlq unknown etiology, spoke with Dr. Johnson Engle who will see pt in er        Amount and/or Complexity of Data Reviewed  Clinical lab tests: ordered and reviewed  Tests in the radiology section of CPT®: ordered and reviewed  Review and summarize past medical records: yes  Discuss the patient with other providers: yes    Risk of Complications, Morbidity, and/or Mortality  Presenting problems: moderate  Diagnostic procedures: moderate  Management options: high    Patient Progress  Patient progress: improved    ED Course       Procedures

## 2017-09-19 ENCOUNTER — PATIENT OUTREACH (OUTPATIENT)
Dept: CASE MANAGEMENT | Age: 77
End: 2017-09-19

## 2017-09-19 PROBLEM — N30.00 ACUTE CYSTITIS: Status: ACTIVE | Noted: 2017-09-19

## 2017-09-19 PROBLEM — D72.829 LEUKOCYTOSIS: Status: ACTIVE | Noted: 2017-09-19

## 2017-09-19 PROBLEM — R10.31 RLQ ABDOMINAL PAIN: Status: ACTIVE | Noted: 2017-09-19

## 2017-09-19 PROBLEM — R11.2 NON-INTRACTABLE VOMITING WITH NAUSEA: Status: ACTIVE | Noted: 2017-09-19

## 2017-09-19 PROBLEM — R19.03 RLQ ABDOMINAL MASS: Status: ACTIVE | Noted: 2017-09-19

## 2017-09-19 LAB
ALBUMIN SERPL-MCNC: 2.4 G/DL (ref 3.2–4.6)
ALBUMIN/GLOB SERPL: 0.6 {RATIO} (ref 1.2–3.5)
ALP SERPL-CCNC: 69 U/L (ref 50–136)
ALT SERPL-CCNC: 11 U/L (ref 12–65)
ANION GAP SERPL CALC-SCNC: 7 MMOL/L (ref 7–16)
AST SERPL-CCNC: 8 U/L (ref 15–37)
BILIRUB SERPL-MCNC: 0.4 MG/DL (ref 0.2–1.1)
BUN SERPL-MCNC: 8 MG/DL (ref 8–23)
CALCIUM SERPL-MCNC: 7.7 MG/DL (ref 8.3–10.4)
CHLORIDE SERPL-SCNC: 107 MMOL/L (ref 98–107)
CO2 SERPL-SCNC: 28 MMOL/L (ref 21–32)
CREAT SERPL-MCNC: 0.68 MG/DL (ref 0.6–1)
ERYTHROCYTE [DISTWIDTH] IN BLOOD BY AUTOMATED COUNT: 16.4 % (ref 11.9–14.6)
GLOBULIN SER CALC-MCNC: 3.9 G/DL (ref 2.3–3.5)
GLUCOSE SERPL-MCNC: 110 MG/DL (ref 65–100)
HCT VFR BLD AUTO: 33.5 % (ref 35.8–46.3)
HGB BLD-MCNC: 10.2 G/DL (ref 11.7–15.4)
MAGNESIUM SERPL-MCNC: 2.3 MG/DL (ref 1.8–2.4)
MCH RBC QN AUTO: 25.1 PG (ref 26.1–32.9)
MCHC RBC AUTO-ENTMCNC: 30.4 G/DL (ref 31.4–35)
MCV RBC AUTO: 82.5 FL (ref 79.6–97.8)
PHOSPHATE SERPL-MCNC: 3.6 MG/DL (ref 2.3–3.7)
PLATELET # BLD AUTO: 362 K/UL (ref 150–450)
PMV BLD AUTO: 9.5 FL (ref 10.8–14.1)
POTASSIUM SERPL-SCNC: 4.1 MMOL/L (ref 3.5–5.1)
PROT SERPL-MCNC: 6.3 G/DL (ref 6.3–8.2)
RBC # BLD AUTO: 4.06 M/UL (ref 4.05–5.25)
SODIUM SERPL-SCNC: 142 MMOL/L (ref 136–145)
TRIGL SERPL-MCNC: 132 MG/DL (ref 35–150)
WBC # BLD AUTO: 8.1 K/UL (ref 4.3–11.1)

## 2017-09-19 PROCEDURE — 77030018786 HC NDL GD F/USND BARD -B

## 2017-09-19 PROCEDURE — 80053 COMPREHEN METABOLIC PANEL: CPT | Performed by: SURGERY

## 2017-09-19 PROCEDURE — 83735 ASSAY OF MAGNESIUM: CPT | Performed by: SURGERY

## 2017-09-19 PROCEDURE — 36415 COLL VENOUS BLD VENIPUNCTURE: CPT | Performed by: SURGERY

## 2017-09-19 PROCEDURE — 74011000250 HC RX REV CODE- 250: Performed by: SURGERY

## 2017-09-19 PROCEDURE — 85027 COMPLETE CBC AUTOMATED: CPT | Performed by: SURGERY

## 2017-09-19 PROCEDURE — 36569 INSJ PICC 5 YR+ W/O IMAGING: CPT | Performed by: SURGERY

## 2017-09-19 PROCEDURE — 77030018719 HC DRSG PTCH ANTIMIC J&J -A

## 2017-09-19 PROCEDURE — 3E0436Z INTRODUCTION OF NUTRITIONAL SUBSTANCE INTO CENTRAL VEIN, PERCUTANEOUS APPROACH: ICD-10-PCS | Performed by: SURGERY

## 2017-09-19 PROCEDURE — 02HV33Z INSERTION OF INFUSION DEVICE INTO SUPERIOR VENA CAVA, PERCUTANEOUS APPROACH: ICD-10-PCS | Performed by: SURGERY

## 2017-09-19 PROCEDURE — 65270000029 HC RM PRIVATE

## 2017-09-19 PROCEDURE — 84478 ASSAY OF TRIGLYCERIDES: CPT | Performed by: SURGERY

## 2017-09-19 PROCEDURE — 76937 US GUIDE VASCULAR ACCESS: CPT

## 2017-09-19 PROCEDURE — 74011250636 HC RX REV CODE- 250/636: Performed by: SURGERY

## 2017-09-19 PROCEDURE — 74011250637 HC RX REV CODE- 250/637: Performed by: SURGERY

## 2017-09-19 PROCEDURE — C1751 CATH, INF, PER/CENT/MIDLINE: HCPCS

## 2017-09-19 PROCEDURE — 84100 ASSAY OF PHOSPHORUS: CPT | Performed by: SURGERY

## 2017-09-19 PROCEDURE — 74011000258 HC RX REV CODE- 258: Performed by: SURGERY

## 2017-09-19 RX ORDER — SODIUM CHLORIDE 0.9 % (FLUSH) 0.9 %
20 SYRINGE (ML) INJECTION AS NEEDED
Status: DISCONTINUED | OUTPATIENT
Start: 2017-09-19 | End: 2017-10-03

## 2017-09-19 RX ORDER — HEPARIN 100 UNIT/ML
600 SYRINGE INTRAVENOUS EVERY 8 HOURS
Status: DISCONTINUED | OUTPATIENT
Start: 2017-09-19 | End: 2017-10-08

## 2017-09-19 RX ORDER — HEPARIN 100 UNIT/ML
600 SYRINGE INTRAVENOUS AS NEEDED
Status: DISCONTINUED | OUTPATIENT
Start: 2017-09-19 | End: 2017-10-03

## 2017-09-19 RX ORDER — SODIUM CHLORIDE 0.9 % (FLUSH) 0.9 %
20 SYRINGE (ML) INJECTION EVERY 8 HOURS
Status: DISCONTINUED | OUTPATIENT
Start: 2017-09-19 | End: 2017-10-03

## 2017-09-19 RX ADMIN — Medication 600 UNITS: at 15:24

## 2017-09-19 RX ADMIN — SOYBEAN OIL 250 ML: 20 INJECTION, SOLUTION INTRAVENOUS at 18:13

## 2017-09-19 RX ADMIN — OXYCODONE HYDROCHLORIDE 10 MG: 5 TABLET ORAL at 09:24

## 2017-09-19 RX ADMIN — PIPERACILLIN SODIUM,TAZOBACTAM SODIUM 3.38 G: 3; .375 INJECTION, POWDER, FOR SOLUTION INTRAVENOUS at 07:05

## 2017-09-19 RX ADMIN — DEXTROSE MONOHYDRATE, SODIUM CHLORIDE, AND POTASSIUM CHLORIDE 110 ML/HR: 50; 4.5; 1.49 INJECTION, SOLUTION INTRAVENOUS at 09:06

## 2017-09-19 RX ADMIN — FAMOTIDINE 20 MG: 10 INJECTION, SOLUTION INTRAVENOUS at 09:23

## 2017-09-19 RX ADMIN — CALCIUM GLUCONATE: 94 INJECTION, SOLUTION INTRAVENOUS at 18:25

## 2017-09-19 RX ADMIN — OXYCODONE HYDROCHLORIDE 10 MG: 5 TABLET ORAL at 00:00

## 2017-09-19 RX ADMIN — Medication 20 ML: at 15:25

## 2017-09-19 RX ADMIN — PIPERACILLIN SODIUM,TAZOBACTAM SODIUM 3.38 G: 3; .375 INJECTION, POWDER, FOR SOLUTION INTRAVENOUS at 22:59

## 2017-09-19 RX ADMIN — Medication 600 UNITS: at 22:30

## 2017-09-19 RX ADMIN — FAMOTIDINE 20 MG: 10 INJECTION, SOLUTION INTRAVENOUS at 21:30

## 2017-09-19 RX ADMIN — OXYCODONE HYDROCHLORIDE 5 MG: 5 TABLET ORAL at 18:25

## 2017-09-19 RX ADMIN — ENOXAPARIN SODIUM 40 MG: 40 INJECTION SUBCUTANEOUS at 09:23

## 2017-09-19 RX ADMIN — Medication 20 ML: at 22:30

## 2017-09-19 RX ADMIN — OXYCODONE HYDROCHLORIDE 10 MG: 5 TABLET ORAL at 22:57

## 2017-09-19 RX ADMIN — PIPERACILLIN SODIUM,TAZOBACTAM SODIUM 3.38 G: 3; .375 INJECTION, POWDER, FOR SOLUTION INTRAVENOUS at 15:26

## 2017-09-19 NOTE — PROGRESS NOTES
PICC PLACEMENT NOTE    PRE-PROCEDURE VERIFICATION    Correct Patient: Yes (Time out preformed)ISIAH PHILLIPS RN IN AGREEMENT WITH TIME OUT. Consent Procedure: Yes  Appropriate Site: Yes    Temperature: Temp: 99.2 °F (37.3 °C), Temperature Source: Temp Source: Oral    Recent Labs      09/19/17   0748   BUN  8   CREA  0.68   PLT  362   WBC  8.1       Allergies: Morphine    Education materials for PICC Care given to family: yes. See Patient Education activity for further details. PICC Booklet placed on bedside table. PROCEDURE DETAIL  A double lumen PICC line was started for TPN. The following documentation is in addition to the PICC properties in the lines/airways flowsheet :  Lot #: [id number]  xylocaine used: yes  Mid-Arm Circumference: 40 (cm)  Internal Catheter Length: 37 (cm)  Internal Catheter Total Length: 37 (cm)  Vein Selection for PICC:right brachial  Central Line Bundle followed yes  Complication Related to Insertion: none  Ports flushed with positive blood return in each port. Guidewires removed intact. The placement was verified by ecg technology: yes. The ecg technology results state the tip location is on the right side and the tip overlies the lower  superior vena cava. Primary nurse notified.     Line is okay to use: yes      Koko Dumas RN

## 2017-09-19 NOTE — PROGRESS NOTES
Problem: Nutrition Deficit  Goal: *Optimize nutritional status  Nutrition  Reason for assessment: Consult for General Nutrition Management and Supplementation. (Dr. Alejandro Aguirre  Assessment:   Diet order(s): NPO  Food/Nutrition History:  The patient present with RLQ pain x 1 month, intermittent n/v and potential for intra-abdominal infection, acute cystitis, and RLQ mass. Per patient, her last \"good meal\" was Saturday. She denies any diet changes d/t abdominal pain over the past ~1 month with the exception of \"maybe eating a little bit less. \"  She reports that she is a \"yo-yo dieter\" and claims that her weight has fluctuated between 135 pounds to 240 pounds. She states that she went on different diets over the past couple of years. I question whether the patient is a reliable historian, however, friend/family member (?) as bedside confirms this. He states that they got on an \"all vegetable diet\" recently (?). Based on given information, I don't believe that the patient is at a high risk for refeeding syndrome with the initiation of TPN tonight. Plan for IV antibiotics for RLQ infected phlegmon and UTI and repeat CT in ~6 days. Central line access : Plan for PICC line placement. Currently with one peripheral line. Abdomen with active bowel sounds, not passing flatus. Pertinent Medications: Zosyn  IVF: D5 1/2 NS with 20 meq KCl/L @ 100ml/hr  Pertinent Labs: Fairly unremarkable. Na 14, K 4.1, glucose 110, Cca 8.78  Phosphorus, magnesium, and triglyceride levels are pending. Anthropometrics:Height: 5' 4\" (162.6 cm), unstated (collected today)  , Weight: 107.1 kg (236 lb 3.2 oz), Body mass index is 40.54 kg/(m^2). [facility] BMI class of morbid obesity class III. Edema: None recorded.   Macronutrient needs:  EER:  5877-6040 kcal /day (13-18 kcal/kg actual BW)  EPR:  55-65 grams protein/day (1-1.2 grams/kg IBW)-(2 gm/kg IBW: 109 g PRO)  Max CHO:  314 grams/day (4mg/kg IBW/min)  Fluid:  1ml/kcal  Intake/Comparative Standards: Current NPO status does not meet kcal or protein needs  IVF contribute 2.4 L fluid, 408 kcal.      Nutrition Diagnosis: Inadequate oral intake r/t inability to consume sufficient oral intake as evidenced by   Intervention:   Meals and snacks: NPO  Nutritional Supplement Therapy: Electrolyte replacement per nutritional support protocols are active on MAR. PN/IVF:   Discontinue current TPN order. 1. If PICC is able to be placed today: Start TPN: 10%DEX/ 4.25%AA at 85 ml/hr with 250 ml 20% Lipids daily provides  1520 kcal/d (100% of needs), 85 grams of protein/d (100% of needs)-1.56 grams of protein/kg of IBW, 200 grams of CHO/d (does not exceed maximum CHO load) and 2250 ml of total volume/d ( 100% of needs). 2. If PICC is unable to be placed today: 5%DEX/ 4.25%AA at 85 ml/hr with 250 ml 20% Lipids daily provides  1180 kcal/d (85% of needs), 85 grams of protein/d (100% of needs)-1.56 grams of protein/kg of IBW, 100 grams of CHO/d (does not exceed maximum CHO load) and 2250 ml of total volume/d (100% of needs). 3. Discontinue IVF once TPN begins at 1800.  4. Add TPN labs, POC Glucoses/SSI if Glucose > 180 mg/dl on AM BMP. *Addendum: PICC placed. Will order TPN of 10%DEX/4.25%AA.       Sierra Schulz [address], [address] [place] [address], [phone]

## 2017-09-19 NOTE — PROGRESS NOTES
Problem: Falls - Risk of  Goal: *Absence of Falls  Document Gia Fall Risk and appropriate interventions in the flowsheet.    Outcome: Progressing Towards Goal  Fall Risk Interventions:  Mobility Interventions: Patient to call before getting OOB           Medication Interventions: Patient to call before getting OOB     Elimination Interventions: Patient to call for help with toileting needs

## 2017-09-19 NOTE — PROGRESS NOTES
H&P/Consult Note/Progress Note/Office Note:   Madan Frias  MRN: [id number]  :1940  Age:76 y.o.    HPI: Madan Frias is a 68 y.o. female who reports a 1 month h/o RLQ pain. This has been worsening and is associated with intermittent N/V. It is moderate in intensity  Nothing makes it better or worse. She was treated previously with laxatives without imp[rovement. She came the [facility] ER about 5 days ago and was treated with antibiotics for UTI. No imaging was done. She came back to the ER today and CT obtained as shown below. She is s/p lap robbi at [facility] approx . She is s/p hysterectomy about 40 yrs ago for benign fibroids. She thinks her ovaries remain. She denies bloody BMs or diarrhea.      Results for Jason Gay (MRN [id number]) as of 2017 09:35   Ref. Range 2017 16:42   CEA Latest Ref Range: 0.0 - 3.0 ng/mL 0.8   CA-125 Latest Ref Range: 1.5 - 35.0 U/mL 5       17 CT abd/pelvis with contrast with IV but not oral contrast   Minimal linear atelectasis or scarring is present in the lung bases,  left greater than right. There is a small to moderate-sized hiatal hernia. No  pleural or pericardial fluid.     Small hypoattenuating lesions are present in the right hepatic lobe and left  kidney, too small to definitively characterize but statistically likely  representing cysts. There is central biliary ductal ectasia status post  cholecystectomy.     The remaining solid abdominal viscera are unremarkable to the limits of  noncontrast CT. Atherosclerotic abdominal aorta and branch vessels. No upper  abdominal no retroperitoneal lymph node enlargement. Nonopacified small bowel  loops are normal in caliber.     Pelvis: There is somewhat prominent lipomatosis along the left posterior  pararenal fascia and iliopsoas without associated soft tissue component,  measuring up to 12.0 x 23 x 9 cm. The bladder is predominantly decompressed.   Uterus not visualized.     Extensive diverticular changes are present in the colon which is otherwise  normal in caliber. There is a large ill-defined inflammatory process in the  right lower quadrant measuring 5.9 x 10.0 x 6.4 cm approximating the ileocecal  junction from which the appendix is not visualized as a distinctly separate  structure. Multiple high attenuation foci are present within or adjacent to the  bowel, suspicious for medication and/or enteroliths. There is moderate adjacent  inflammatory fat stranding and multiple prominent right lower quadrant  mesenteric lymph nodes, the largest of which measures 1.2 cm in short axis.     Trace dependent free pelvic fluid. No aggressive osseous lesions.     IMPRESSION:     1. Nonspecific inflammatory process in the right lower quadrant at the ileocecal  junction. Differential considerations include infectious, and ischemic colitis,  appendicitis, inflammatory bowel disease, omental infarct. Surgical consultation  recommended.     2. Large extraperitoneal predominantly fatty attenuation mass without definite  enhancing soft tissue.  Size places the patient at risk for sarcoma, the inferior  extent of which is not included in the field of view.         9/19/17 Abd pain unchanged; Mass-effect in RLQ; PICC requested; on IV ABx; NPO        Past Medical History:   Diagnosis Date    Anxiety state, unspecified     GERD (gastroesophageal reflux disease)     Hypercholesterolemia      Past Surgical History:   Procedure Laterality Date    HX CHOLECYSTECTOMY      HX COLONOSCOPY      Dr Epifanio Courtney 2012, repeat 5 years    HX GYN      hysterectomy, partial    HX KNEE REPLACEMENT      right     Current Facility-Administered Medications   Medication Dose Route Frequency    TPN ADULT - CENTRAL   IntraVENous CONTINUOUS    enoxaparin (LOVENOX) injection 40 mg  40 mg SubCUTAneous Q24H    famotidine (PF) (PEPCID) injection 20 mg  20 mg IntraVENous Q12H    acetaminophen (TYLENOL) tablet 1,000 mg  1,000 mg Oral Q6H PRN    dextrose 5% - 0.45% NaCl with KCl 20 mEq/L infusion  110 mL/hr IntraVENous CONTINUOUS    oxyCODONE IR (ROXICODONE) tablet 5 mg  5 mg Oral Q4H PRN    Or    oxyCODONE IR (ROXICODONE) tablet 10 mg  10 mg Oral Q4H PRN    piperacillin-tazobactam (ZOSYN) 3.375 g in 0.9% sodium chloride (MBP/ADV) 100 mL  3.375 g IntraVENous Q8H     Morphine  Social History     Social History    Marital status:      Spouse name: N/A    Number of children: N/A    Years of education: N/A     Social History Main Topics    Smoking status: Former Smoker     Quit date: 1/1/1975    Smokeless tobacco: Never Used    Alcohol use No    Drug use: No    Sexual activity: Not on file     Other Topics Concern    Not on file     Social History Narrative     History   Smoking Status    Former Smoker    Quit date: 1/1/1975   Smokeless Tobacco    Never Used     Family History   Problem Relation Age of Onset    Diabetes Mother     Heart Disease Mother     Thyroid Disease Mother     Heart Disease Father      ROS: The patient has no difficulty with chest pain or shortness of breath. No fever or chills. Comprehensive review of systems was otherwise unremarkable except as noted above. Physical Exam:   Visit Vitals    /67    Pulse 72    Temp 97.9 °F (36.6 °C)    Resp 17    SpO2 96%     Constitutional: Alert, oriented, cooperative patient in no acute distress; appears stated age    Eyes:Sclera are clear. EOMs intact  ENMT: no external lesions gross hearing normal; no obvious neck masses, no ear or lip lesions, nares normal  CV: RRR. Normal perfusion  Resp: No JVD. Breathing is  non-labored; no audible wheezing. GI: soft and non-distended; obese; fullness/mass-effect in RLQ; tender to deeper palpation; no guarding    Musculoskeletal: unremarkable with normal function. No embolic signs or cyanosis.    Neuro:  Oriented; moves all 4; no focal deficits  Psychiatric: normal affect and mood, no memory impairment    Recent vitals (if inpt):  Patient Vitals for the past 24 hrs:   BP Temp Pulse Resp SpO2   09/19/17 0630 116/67 97.9 °F (36.6 °C) 72 17 96 %   09/19/17 0314 118/71 98.5 °F (36.9 °C) 74 18 92 %   09/18/17 2337 110/64 98.2 °F (36.8 °C) 69 16 94 %   09/18/17 2000 127/52 98.2 °F (36.8 °C) 67 16 92 %   09/18/17 1742 136/59 98.1 °F (36.7 °C) 71 16 98 %       Labs:  Recent Labs      09/19/17   0748   WBC  8.1   HGB  10.2*   PLT  362   NA  142   K  4.1   CL  107   CO2  28   BUN  8   CREA  0.68   GLU  110*   TBILI  0.4   SGOT  8*   ALT  11*   AP  69       Lab Results   Component Value Date/Time    WBC 8.1 09/19/2017 07:48 AM    HGB 10.2 09/19/2017 07:48 AM    PLATELET 282 34/08/7919 07:48 AM    Sodium 142 09/19/2017 07:48 AM    Potassium 4.1 09/19/2017 07:48 AM    Chloride 107 09/19/2017 07:48 AM    CO2 28 09/19/2017 07:48 AM    BUN 8 09/19/2017 07:48 AM    Creatinine 0.68 09/19/2017 07:48 AM    Glucose 110 09/19/2017 07:48 AM    Bilirubin, total 0.4 09/19/2017 07:48 AM    AST (SGOT) 8 09/19/2017 07:48 AM    ALT (SGPT) 11 09/19/2017 07:48 AM    Alk. phosphatase 69 09/19/2017 07:48 AM       Results for Jerri Grapes (MRN [id number]) as of 9/19/2017 09:35   Ref. Range 9/18/2017 16:42   CEA Latest Ref Range: 0.0 - 3.0 ng/mL 0.8   CA-125 Latest Ref Range: 1.5 - 35.0 U/mL 5         I reviewed recent labs and recent radiologic studies. I independently reviewed radiology images for studies I described above or studies I have ordered.    Admission date (for inpatients): 9/18/2017   * No surgery found *  * No surgery found *    ASSESSMENT/PLAN:  Problem List  Date Reviewed: 9/5/2017          Codes Class Noted    Acute cystitis ICD-10-CM: N30.00  ICD-9-CM: 595.0  9/19/2017        RLQ abdominal mass ICD-10-CM: R19.03  ICD-9-CM: 789.33  9/19/2017        RLQ abdominal pain ICD-10-CM: R10.31  ICD-9-CM: 789.03  9/19/2017        Leukocytosis ICD-10-CM: L45.564  ICD-9-CM: 288.60  9/19/2017        Non-intractable vomiting with nausea ICD-10-CM: R11.2  ICD-9-CM: 787.01  9/19/2017        * (Principal)Intra-abdominal infection ICD-10-CM: B99.9  ICD-9-CM: 136.9  9/18/2017        Anxiety state, unspecified ICD-10-CM: F41.1  ICD-9-CM: 300.00  Unknown        GERD (gastroesophageal reflux disease) ICD-10-CM: K21.9  ICD-9-CM: 530.81  Unknown        Hypercholesterolemia ICD-10-CM: E78.00  ICD-9-CM: 272.0  Unknown            Principal Problem:    Intra-abdominal infection (9/18/2017)    Active Problems:    Acute cystitis (9/19/2017)      RLQ abdominal mass (9/19/2017)      RLQ abdominal pain (9/19/2017)      Leukocytosis (9/19/2017)      Non-intractable vomiting with nausea (9/19/2017)       RLQ phlegmon involving bowel    Differential Dx  Infectious Enteritis  Ruptured (delayed presentation) appendictis with RLQ phelgmon  Malignancy (carcinoma, sarcoma, ovarian carcinoma)  Diverticulitis  Epiploicae Appendagitis    IV abx for suspected RLQ infected phlegmon and UTI  Check Stool cultures   PICC line requested  Start TPN  Expect prolonged bowel rest  NPO  Likely repeat CT in approx 6 days  She may need IR drainage on follow-up imaging if abscess develops or possible perc biopsy.     GI (pepcid) and VTE (lovenox) prophylaxis ordered            Signed:  Keren Perry MD,  FACS

## 2017-09-19 NOTE — PROGRESS NOTES
END OF SHIFT NOTE:    INTAKE/OUTPUT  09/18 0701 - 09/19 0700  In: 1416 [I.V.:1416]  Out: 0   Voiding: YES  Catheter: NO  Drain:              Flatus: Patient does have flatus present. Stool:  0 occurrences. Characteristics:       Emesis: 0 occurrences. Characteristics:        VITAL SIGNS  Patient Vitals for the past 12 hrs:   Temp Pulse Resp BP SpO2   09/19/17 0630 97.9 °F (36.6 °C) 72 17 116/67 96 %   09/19/17 0314 98.5 °F (36.9 °C) 74 18 118/71 92 %   09/18/17 2337 98.2 °F (36.8 °C) 69 16 110/64 94 %       Pain Assessment  Pain Intensity 1: 0 (09/19/17 0100)  Pain Location 1: Abdomen  Pain Intervention(s) 1: Medication (see MAR)  Patient Stated Pain Goal: 0    Ambulating  Yes    Shift report given to oncoming nurse at the bedside.     Michael Velez RN

## 2017-09-19 NOTE — PROGRESS NOTES
Per [facility] patient currently admitted to hospital as of 09/19/2017, Care Coordinator unable to complete Transitions of Care Outreach due to patient current admit status. This note will not be viewable in [address]

## 2017-09-20 LAB
ALBUMIN SERPL-MCNC: 2.4 G/DL (ref 3.2–4.6)
ALBUMIN/GLOB SERPL: 0.6 {RATIO} (ref 1.2–3.5)
ALP SERPL-CCNC: 70 U/L (ref 50–136)
ALT SERPL-CCNC: 12 U/L (ref 12–65)
ANION GAP SERPL CALC-SCNC: 6 MMOL/L (ref 7–16)
AST SERPL-CCNC: 11 U/L (ref 15–37)
BACTERIA SPEC CULT: NORMAL
BILIRUB SERPL-MCNC: 0.2 MG/DL (ref 0.2–1.1)
BUN SERPL-MCNC: 9 MG/DL (ref 8–23)
CALCIUM SERPL-MCNC: 8 MG/DL (ref 8.3–10.4)
CHLORIDE SERPL-SCNC: 107 MMOL/L (ref 98–107)
CO2 SERPL-SCNC: 27 MMOL/L (ref 21–32)
CREAT SERPL-MCNC: 0.61 MG/DL (ref 0.6–1)
ERYTHROCYTE [DISTWIDTH] IN BLOOD BY AUTOMATED COUNT: 16.1 % (ref 11.9–14.6)
GLOBULIN SER CALC-MCNC: 4.2 G/DL (ref 2.3–3.5)
GLUCOSE SERPL-MCNC: 106 MG/DL (ref 65–100)
HCT VFR BLD AUTO: 32.9 % (ref 35.8–46.3)
HGB BLD-MCNC: 9.9 G/DL (ref 11.7–15.4)
MAGNESIUM SERPL-MCNC: 2.1 MG/DL (ref 1.8–2.4)
MCH RBC QN AUTO: 24.9 PG (ref 26.1–32.9)
MCHC RBC AUTO-ENTMCNC: 30.1 G/DL (ref 31.4–35)
MCV RBC AUTO: 82.7 FL (ref 79.6–97.8)
PHOSPHATE SERPL-MCNC: 2.7 MG/DL (ref 2.3–3.7)
PLATELET # BLD AUTO: 376 K/UL (ref 150–450)
PMV BLD AUTO: 9.6 FL (ref 10.8–14.1)
POTASSIUM SERPL-SCNC: 4.5 MMOL/L (ref 3.5–5.1)
PROT SERPL-MCNC: 6.6 G/DL (ref 6.3–8.2)
RBC # BLD AUTO: 3.98 M/UL (ref 4.05–5.25)
SERVICE CMNT-IMP: NORMAL
SODIUM SERPL-SCNC: 140 MMOL/L (ref 136–145)
TRIGL SERPL-MCNC: 141 MG/DL (ref 35–150)
WBC # BLD AUTO: 7.7 K/UL (ref 4.3–11.1)

## 2017-09-20 PROCEDURE — 84100 ASSAY OF PHOSPHORUS: CPT | Performed by: SURGERY

## 2017-09-20 PROCEDURE — 74011250636 HC RX REV CODE- 250/636: Performed by: SURGERY

## 2017-09-20 PROCEDURE — 80053 COMPREHEN METABOLIC PANEL: CPT | Performed by: SURGERY

## 2017-09-20 PROCEDURE — 84478 ASSAY OF TRIGLYCERIDES: CPT | Performed by: SURGERY

## 2017-09-20 PROCEDURE — 74011000258 HC RX REV CODE- 258: Performed by: SURGERY

## 2017-09-20 PROCEDURE — 85027 COMPLETE CBC AUTOMATED: CPT | Performed by: SURGERY

## 2017-09-20 PROCEDURE — 83735 ASSAY OF MAGNESIUM: CPT | Performed by: SURGERY

## 2017-09-20 PROCEDURE — 65270000029 HC RM PRIVATE

## 2017-09-20 PROCEDURE — 74011000250 HC RX REV CODE- 250: Performed by: SURGERY

## 2017-09-20 PROCEDURE — 74011250637 HC RX REV CODE- 250/637: Performed by: SURGERY

## 2017-09-20 RX ORDER — FAMOTIDINE 10 MG/ML
20 INJECTION INTRAVENOUS EVERY 12 HOURS
Status: DISCONTINUED | OUTPATIENT
Start: 2017-09-20 | End: 2017-09-20 | Stop reason: SDUPTHER

## 2017-09-20 RX ORDER — ONDANSETRON 2 MG/ML
4 INJECTION INTRAMUSCULAR; INTRAVENOUS
Status: DISCONTINUED | OUTPATIENT
Start: 2017-09-20 | End: 2017-10-10 | Stop reason: HOSPADM

## 2017-09-20 RX ORDER — HYDROMORPHONE HYDROCHLORIDE 1 MG/ML
.2-.3 INJECTION, SOLUTION INTRAMUSCULAR; INTRAVENOUS; SUBCUTANEOUS
Status: DISCONTINUED | OUTPATIENT
Start: 2017-09-20 | End: 2017-10-03

## 2017-09-20 RX ADMIN — PIPERACILLIN SODIUM,TAZOBACTAM SODIUM 3.38 G: 3; .375 INJECTION, POWDER, FOR SOLUTION INTRAVENOUS at 14:24

## 2017-09-20 RX ADMIN — ONDANSETRON 4 MG: 2 INJECTION INTRAMUSCULAR; INTRAVENOUS at 20:29

## 2017-09-20 RX ADMIN — ONDANSETRON 4 MG: 2 INJECTION INTRAMUSCULAR; INTRAVENOUS at 16:26

## 2017-09-20 RX ADMIN — OXYCODONE HYDROCHLORIDE 5 MG: 5 TABLET ORAL at 10:41

## 2017-09-20 RX ADMIN — ENOXAPARIN SODIUM 40 MG: 40 INJECTION SUBCUTANEOUS at 09:03

## 2017-09-20 RX ADMIN — Medication 600 UNITS: at 05:36

## 2017-09-20 RX ADMIN — ONDANSETRON 4 MG: 2 INJECTION INTRAMUSCULAR; INTRAVENOUS at 06:52

## 2017-09-20 RX ADMIN — OXYCODONE HYDROCHLORIDE 10 MG: 5 TABLET ORAL at 23:45

## 2017-09-20 RX ADMIN — POTASSIUM CHLORIDE: 2 INJECTION, SOLUTION, CONCENTRATE INTRAVENOUS at 18:09

## 2017-09-20 RX ADMIN — OXYCODONE HYDROCHLORIDE 10 MG: 5 TABLET ORAL at 03:50

## 2017-09-20 RX ADMIN — Medication 20 ML: at 22:30

## 2017-09-20 RX ADMIN — Medication 600 UNITS: at 14:24

## 2017-09-20 RX ADMIN — PIPERACILLIN SODIUM,TAZOBACTAM SODIUM 3.38 G: 3; .375 INJECTION, POWDER, FOR SOLUTION INTRAVENOUS at 23:42

## 2017-09-20 RX ADMIN — SOYBEAN OIL 250 ML: 20 INJECTION, SOLUTION INTRAVENOUS at 18:09

## 2017-09-20 RX ADMIN — OXYCODONE HYDROCHLORIDE 5 MG: 5 TABLET ORAL at 15:56

## 2017-09-20 RX ADMIN — Medication 20 ML: at 14:24

## 2017-09-20 RX ADMIN — ACETAMINOPHEN 1000 MG: 500 TABLET, FILM COATED ORAL at 20:29

## 2017-09-20 RX ADMIN — PIPERACILLIN SODIUM,TAZOBACTAM SODIUM 3.38 G: 3; .375 INJECTION, POWDER, FOR SOLUTION INTRAVENOUS at 06:00

## 2017-09-20 RX ADMIN — FAMOTIDINE 20 MG: 10 INJECTION, SOLUTION INTRAVENOUS at 09:03

## 2017-09-20 RX ADMIN — HYDROMORPHONE HYDROCHLORIDE 0.3 MG: 1 INJECTION, SOLUTION INTRAMUSCULAR; INTRAVENOUS; SUBCUTANEOUS at 20:29

## 2017-09-20 RX ADMIN — Medication 20 ML: at 05:36

## 2017-09-20 RX ADMIN — Medication 600 UNITS: at 22:30

## 2017-09-20 NOTE — PROGRESS NOTES
Patient's daughter called requesting update on patient status but did not have privacy code. This patient stated that it was okay to give daughter update as well as privacy code.

## 2017-09-20 NOTE — PROGRESS NOTES
H&P/Consult Note/Progress Note/Office Note:   Laxmi Wright  MRN: [id number]  :1940  Age:76 y.o.    HPI: Laxmi Wright is a 68 y.o. female who reports a 1 month h/o RLQ pain. This was progressively worsening and associated with intermittent N/V. It was moderate in intensity (8/10) leading up to admission. Nothing makes it better or worse. She was treated previously with laxatives without improvement. She came the [facility] ER about 5 days prior to admission and was treated with antibiotics for UTI. No imaging was done. She came back to the ER on the day of admission and CT scan was obtained as shown below. She is s/p lap francois at [facility] in 2012. She is s/p hysterectomy about 40 yrs ago for benign fibroids. She thinks her ovaries remain. She denies associated bloody BMs or diarrhea.        Results for Charlene Chris (MRN [id number]) as of 2017 09:35   Ref. Range 2017 16:42   CEA Latest Ref Range: 0.0 - 3.0 ng/mL 0.8   CA-125 Latest Ref Range: 1.5 - 35.0 U/mL 5       17 CT abd/pelvis with contrast with IV but not oral contrast   Minimal linear atelectasis or scarring is present in the lung bases,  left greater than right. There is a small to moderate-sized hiatal hernia. No  pleural or pericardial fluid.     Small hypoattenuating lesions are present in the right hepatic lobe and left  kidney, too small to definitively characterize but statistically likely  representing cysts. There is central biliary ductal ectasia status post  cholecystectomy.     The remaining solid abdominal viscera are unremarkable to the limits of  noncontrast CT. Atherosclerotic abdominal aorta and branch vessels. No upper  abdominal no retroperitoneal lymph node enlargement. Nonopacified small bowel  loops are normal in caliber.     Pelvis:  There is somewhat prominent lipomatosis along the left posterior  pararenal fascia and iliopsoas without associated soft tissue component,  measuring up to 12.0 x 23 x 9 cm. The bladder is predominantly decompressed. Uterus not visualized.     Extensive diverticular changes are present in the colon which is otherwise  normal in caliber. There is a large ill-defined inflammatory process in the  right lower quadrant measuring 5.9 x 10.0 x 6.4 cm approximating the ileocecal  junction from which the appendix is not visualized as a distinctly separate  structure. Multiple high attenuation foci are present within or adjacent to the  bowel, suspicious for medication and/or enteroliths. There is moderate adjacent  inflammatory fat stranding and multiple prominent right lower quadrant  mesenteric lymph nodes, the largest of which measures 1.2 cm in short axis.     Trace dependent free pelvic fluid. No aggressive osseous lesions.     IMPRESSION:     1. Nonspecific inflammatory process in the right lower quadrant at the ileocecal  junction. Differential considerations include infectious, and ischemic colitis,  appendicitis, inflammatory bowel disease, omental infarct. Surgical consultation  recommended.     2. Large extraperitoneal predominantly fatty attenuation mass without definite  enhancing soft tissue.  Size places the patient at risk for sarcoma, the inferior  extent of which is not included in the field of view.        9/19/17 Abd pain unchanged; Mass-effect in RLQ; PICC requested; on IV ABx; NPO  9/20/17 PICC placed yesterday; on TPN; Nausea persists;  RLQ pain no better since admission and still 8/10  Requests adult diapers secondary to urinary incontinence        Past Medical History:   Diagnosis Date    Anxiety state, unspecified     GERD (gastroesophageal reflux disease)     Hypercholesterolemia      Past Surgical History:   Procedure Laterality Date    HX CHOLECYSTECTOMY      HX COLONOSCOPY      Dr Jennifer Florian 2012, repeat 5 years    HX GYN      hysterectomy, partial    HX KNEE REPLACEMENT      right     Current Facility-Administered Medications   Medication Dose Route Frequency    TPN ADULT - dextrose 10% amino acid 4.25%   IntraVENous QPM    famotidine (PF) (PEPCID) injection 20 mg  20 mg IntraVENous Q12H    ondansetron (ZOFRAN) injection 4 mg  4 mg IntraVENous Q4H PRN    promethazine (PHENERGAN) with saline injection 12.5 mg  12.5 mg IntraVENous Q4H PRN    NUTRITIONAL SUPPORT ELECTROLYTE PRN ORDERS   Does Not Apply PRN    fat emulsion 20% (LIPOSYN, INTRALIPID) infusion 250 mL  250 mL IntraVENous QPM    sodium chloride (NS) flush 20 mL  20 mL InterCATHeter Q8H    heparin (porcine) pf 600 Units  600 Units InterCATHeter Q8H    sodium chloride (NS) flush 20 mL  20 mL InterCATHeter PRN    heparin (porcine) pf 600 Units  600 Units InterCATHeter PRN    enoxaparin (LOVENOX) injection 40 mg  40 mg SubCUTAneous Q24H    acetaminophen (TYLENOL) tablet 1,000 mg  1,000 mg Oral Q6H PRN    oxyCODONE IR (ROXICODONE) tablet 5 mg  5 mg Oral Q4H PRN    Or    oxyCODONE IR (ROXICODONE) tablet 10 mg  10 mg Oral Q4H PRN    piperacillin-tazobactam (ZOSYN) 3.375 g in 0.9% sodium chloride (MBP/ADV) 100 mL  3.375 g IntraVENous Q8H     Morphine  Social History     Social History    Marital status:      Spouse name: N/A    Number of children: N/A    Years of education: N/A     Social History Main Topics    Smoking status: Former Smoker     Quit date: 1/1/1975    Smokeless tobacco: Never Used    Alcohol use No    Drug use: No    Sexual activity: Not on file     Other Topics Concern    Not on file     Social History Narrative     History   Smoking Status    Former Smoker    Quit date: 1/1/1975   Smokeless Tobacco    Never Used     Family History   Problem Relation Age of Onset    Diabetes Mother     Heart Disease Mother     Thyroid Disease Mother     Heart Disease Father      ROS: The patient has no difficulty with chest pain or shortness of breath. No fever or chills. Comprehensive review of systems was otherwise unremarkable except as noted above.     Physical Exam: Visit Vitals    /64    Pulse 73    Temp 97.6 °F (36.4 °C)    Resp 18    Ht 5' 4\" (1.626 m)    Wt 107.1 kg (236 lb 3.2 oz)    SpO2 93%    BMI 40.54 kg/m2     Constitutional: Alert, oriented, cooperative patient in no acute distress; appears stated age    Eyes:Sclera are clear. EOMs intact  ENMT: no external lesions gross hearing normal; no obvious neck masses, no ear or lip lesions, nares normal  CV: RRR. Normal perfusion  Resp: No JVD. Breathing is  non-labored; no audible wheezing. GI: obese; firmness/fullness/mass-effect in RLQ is unchanged since admission; tender to deeper palpation; no guarding    Musculoskeletal: unremarkable with normal function. No embolic signs or cyanosis.    Neuro:  Oriented; moves all 4; no focal deficits  Psychiatric: normal affect and mood, no memory impairment    Recent vitals (if inpt):  Patient Vitals for the past 24 hrs:   BP Temp Pulse Resp SpO2 Height Weight   09/20/17 0400 116/64 97.6 °F (36.4 °C) 73 18 93 % - -   09/19/17 2248 144/60 98.3 °F (36.8 °C) 73 18 96 % - -   09/19/17 2013 117/70 98.2 °F (36.8 °C) 69 18 93 % - -   09/19/17 1440 125/73 98.8 °F (37.1 °C) 69 17 95 % - -   09/19/17 1044 - - - - - 5' 4\" (1.626 m) 107.1 kg (236 lb 3.2 oz)   09/19/17 1030 111/68 99.2 °F (37.3 °C) 71 17 90 % - -       Labs:  Recent Labs      09/20/17   0555  09/19/17   0748   WBC  7.7  8.1   HGB  9.9*  10.2*   PLT  376  362   NA   --   142   K   --   4.1   CL   --   107   CO2   --   28   BUN   --   8   CREA   --   0.68   GLU   --   110*   TBILI   --   0.4   SGOT   --   8*   ALT   --   11*   AP   --   69       Lab Results   Component Value Date/Time    WBC 7.7 09/20/2017 05:55 AM    HGB 9.9 09/20/2017 05:55 AM    PLATELET 849 72/57/4407 05:55 AM    Sodium 142 09/19/2017 07:48 AM    Potassium 4.1 09/19/2017 07:48 AM    Chloride 107 09/19/2017 07:48 AM    CO2 28 09/19/2017 07:48 AM    BUN 8 09/19/2017 07:48 AM    Creatinine 0.68 09/19/2017 07:48 AM    Glucose 110 09/19/2017 07:48 AM    Bilirubin, total 0.4 09/19/2017 07:48 AM    AST (SGOT) 8 09/19/2017 07:48 AM    ALT (SGPT) 11 09/19/2017 07:48 AM    Alk. phosphatase 69 09/19/2017 07:48 AM       Results for Ang Kern (MRN [id number]) as of 9/19/2017 09:35   Ref. Range 9/18/2017 16:42   CEA Latest Ref Range: 0.0 - 3.0 ng/mL 0.8   CA-125 Latest Ref Range: 1.5 - 35.0 U/mL 5         I reviewed recent labs and recent radiologic studies. I independently reviewed radiology images for studies I described above or studies I have ordered.    Admission date (for inpatients): 9/18/2017   * No surgery found *  * No surgery found *    ASSESSMENT/PLAN:  Problem List  Date Reviewed: 9/5/2017          Codes Class Noted    Acute cystitis ICD-10-CM: N30.00  ICD-9-CM: 595.0  9/19/2017        RLQ abdominal mass ICD-10-CM: R19.03  ICD-9-CM: 789.33  9/19/2017        RLQ abdominal pain ICD-10-CM: R10.31  ICD-9-CM: 789.03  9/19/2017        Leukocytosis ICD-10-CM: D72.829  ICD-9-CM: 288.60  9/19/2017        Non-intractable vomiting with nausea ICD-10-CM: R11.2  ICD-9-CM: 787.01  9/19/2017        * (Principal)Intra-abdominal infection ICD-10-CM: B99.9  ICD-9-CM: 136.9  9/18/2017        Anxiety state, unspecified ICD-10-CM: F41.1  ICD-9-CM: 300.00  Unknown        GERD (gastroesophageal reflux disease) ICD-10-CM: K21.9  ICD-9-CM: 530.81  Unknown        Hypercholesterolemia ICD-10-CM: E78.00  ICD-9-CM: 272.0  Unknown            Principal Problem:    Intra-abdominal infection (9/18/2017)    Active Problems:    Acute cystitis (9/19/2017)      RLQ abdominal mass (9/19/2017)      RLQ abdominal pain (9/19/2017)      Leukocytosis (9/19/2017)      Non-intractable vomiting with nausea (9/19/2017)       RLQ phlegmon involving bowel with RLQ pain, N/V    Differential Dx  Ruptured (delayed presentation) appendictis with RLQ phelgmon  Malignancy (carcinoma, sarcoma, ovarian carcinoma)  Epiploicae Appendagitis  IBD  Diverticulitis  Ischemic Enteritis  Infectious Enteritis      IV abx for RLQ infected phlegmon/mass and UTI  CEA and  normal  Stool cultures   PICC line/TPN  Expect prolonged bowel rest  NPO  Adult diaper for urinary incontinence - Pt couldn't make it to [facility] and wet bed  IV narcotic prn pain  IV Zofran/phenergan for nausea prn     Repeat CT on Monday (after 5 more days bowel rest and IV Abx)  She may need IR drainage on follow-up imaging if abscess develops or possible perc biopsy.     GI (pepcid- add to TPN today) and VTE (lovenox) prophylaxis ordered            Signed:  Khushi Brar MD,  FACS

## 2017-09-20 NOTE — PROGRESS NOTES
END OF SHIFT NOTE:    INTAKE/OUTPUT  09/19 0701 - 09/20 0700  In: 2494 [I.V.:2494]  Out: 0   Voiding: YES  Catheter: NO  Drain:              Flatus: Patient does have flatus present. Stool:  0 occurrences. Characteristics:       Emesis: 0 occurrences. Characteristics:        VITAL SIGNS  Patient Vitals for the past 12 hrs:   Temp Pulse Resp BP SpO2   09/20/17 0630 99.1 °F (37.3 °C) 85 18 124/70 91 %   09/20/17 0400 97.6 °F (36.4 °C) 73 18 116/64 93 %   09/19/17 2248 98.3 °F (36.8 °C) 73 18 144/60 96 %   09/19/17 2013 98.2 °F (36.8 °C) 69 18 117/70 93 %       Pain Assessment  Pain Intensity 1: 7 (09/20/17 0350)  Pain Location 1: Abdomen  Pain Intervention(s) 1: Medication (see MAR)  Patient Stated Pain Goal: 0    Ambulating  Yes    Shift report given to oncoming nurse at the bedside.     Jimi Pimentel, RN

## 2017-09-20 NOTE — PROGRESS NOTES
Problem: Nutrition Deficit  Goal: *Optimize nutritional status  Nutrition F/U  TPN management (Dr. Sridevi West)  Assessment:   · Diet order(s): 9-18: NPO  · Central line access: Double lumen PICC  · TPN dependent d/t RLQ infected phlegmon/mass with pain N/V.   · Macronutrient needs:  EER:  1106-2654 kcal /day (13-18 kcal/kg actual BW)  Revised EPR:  65-82 grams protein/day (1.2-1.5 grams/kg IBW). Dr Gerald Zacarias standard request is 2 gm/kg LVX=392 g PRO/d)  Max CHO:  314 grams/day (4mg/kg IBW/min)  Fluid:  1ml/kcal  Intake/Comparative Standards: 2L/d of 10%DEX/ 4.25%AA at 85 ml/hr with 250 ml 20% Lipids daily provides 1520 kcal/d (100% of needs), 85 grams of protein/d (1.56 grams of protein/kg of IBW), 200 grams of CHO/d (does not exceed maximum CHO load) and ~2300 ml of total volume/d (100% of needs). Intervention:   Meals and snacks: NPO  Nutritional Supplement Therapy: Electrolyte replacement per nutritional support protocols are active on MAR. PN:   1. Continue current TPN. Dr Sridevi West added 40 mg Pepcid/d to start tonight. 2. If 2 grams protein/kg is desired for this pt, this could be met with 10%DEX/ 4.25%AA at 105 ml/hr with 250 ml 20% Lipids daily provides 1785 kcal/d (85% of needs), 107 grams of protein/d (1.96 grams of protein/kg of IBW), 252 grams of CHO/d (does not exceed maximum CHO load) and ~2770 ml of total volume/d (>100% of needs).      Barrett Chavez, [address] [place] [address], [facility] [address]

## 2017-09-21 ENCOUNTER — PATIENT OUTREACH (OUTPATIENT)
Dept: CASE MANAGEMENT | Age: 77
End: 2017-09-21

## 2017-09-21 LAB
ALBUMIN SERPL-MCNC: 2.3 G/DL (ref 3.2–4.6)
ALBUMIN/GLOB SERPL: 0.6 {RATIO} (ref 1.2–3.5)
ALP SERPL-CCNC: 67 U/L (ref 50–136)
ALT SERPL-CCNC: 13 U/L (ref 12–65)
ANION GAP SERPL CALC-SCNC: 4 MMOL/L (ref 7–16)
AST SERPL-CCNC: 9 U/L (ref 15–37)
BILIRUB SERPL-MCNC: <0.1 MG/DL (ref 0.2–1.1)
BUN SERPL-MCNC: 12 MG/DL (ref 8–23)
CALCIUM SERPL-MCNC: 8.3 MG/DL (ref 8.3–10.4)
CHLORIDE SERPL-SCNC: 108 MMOL/L (ref 98–107)
CO2 SERPL-SCNC: 30 MMOL/L (ref 21–32)
CREAT SERPL-MCNC: 0.57 MG/DL (ref 0.6–1)
ERYTHROCYTE [DISTWIDTH] IN BLOOD BY AUTOMATED COUNT: 15.8 % (ref 11.9–14.6)
GLOBULIN SER CALC-MCNC: 3.9 G/DL (ref 2.3–3.5)
GLUCOSE SERPL-MCNC: 116 MG/DL (ref 65–100)
HCT VFR BLD AUTO: 31.8 % (ref 35.8–46.3)
HGB BLD-MCNC: 9.5 G/DL (ref 11.7–15.4)
MCH RBC QN AUTO: 25.1 PG (ref 26.1–32.9)
MCHC RBC AUTO-ENTMCNC: 29.9 G/DL (ref 31.4–35)
MCV RBC AUTO: 83.9 FL (ref 79.6–97.8)
PLATELET # BLD AUTO: 354 K/UL (ref 150–450)
PMV BLD AUTO: 9.9 FL (ref 10.8–14.1)
POTASSIUM SERPL-SCNC: 4.5 MMOL/L (ref 3.5–5.1)
PROT SERPL-MCNC: 6.2 G/DL (ref 6.3–8.2)
RBC # BLD AUTO: 3.79 M/UL (ref 4.05–5.25)
SODIUM SERPL-SCNC: 142 MMOL/L (ref 136–145)
WBC # BLD AUTO: 7.1 K/UL (ref 4.3–11.1)

## 2017-09-21 PROCEDURE — 74011250637 HC RX REV CODE- 250/637: Performed by: SURGERY

## 2017-09-21 PROCEDURE — 74011000258 HC RX REV CODE- 258: Performed by: SURGERY

## 2017-09-21 PROCEDURE — 74011250636 HC RX REV CODE- 250/636: Performed by: SURGERY

## 2017-09-21 PROCEDURE — 65270000029 HC RM PRIVATE

## 2017-09-21 PROCEDURE — 80053 COMPREHEN METABOLIC PANEL: CPT | Performed by: SURGERY

## 2017-09-21 PROCEDURE — 85027 COMPLETE CBC AUTOMATED: CPT | Performed by: SURGERY

## 2017-09-21 PROCEDURE — 74011000250 HC RX REV CODE- 250: Performed by: SURGERY

## 2017-09-21 RX ORDER — ENOXAPARIN SODIUM 100 MG/ML
40 INJECTION SUBCUTANEOUS EVERY 12 HOURS
Status: DISCONTINUED | OUTPATIENT
Start: 2017-09-21 | End: 2017-10-10 | Stop reason: HOSPADM

## 2017-09-21 RX ADMIN — ENOXAPARIN SODIUM 40 MG: 40 INJECTION SUBCUTANEOUS at 20:24

## 2017-09-21 RX ADMIN — HYDROMORPHONE HYDROCHLORIDE 0.3 MG: 1 INJECTION, SOLUTION INTRAMUSCULAR; INTRAVENOUS; SUBCUTANEOUS at 15:53

## 2017-09-21 RX ADMIN — OXYCODONE HYDROCHLORIDE 10 MG: 5 TABLET ORAL at 21:31

## 2017-09-21 RX ADMIN — OXYCODONE HYDROCHLORIDE 5 MG: 5 TABLET ORAL at 12:23

## 2017-09-21 RX ADMIN — ENOXAPARIN SODIUM 40 MG: 40 INJECTION SUBCUTANEOUS at 07:32

## 2017-09-21 RX ADMIN — Medication 20 ML: at 21:37

## 2017-09-21 RX ADMIN — Medication 20 ML: at 15:41

## 2017-09-21 RX ADMIN — SOYBEAN OIL 250 ML: 20 INJECTION, SOLUTION INTRAVENOUS at 17:52

## 2017-09-21 RX ADMIN — POTASSIUM CHLORIDE: 2 INJECTION, SOLUTION, CONCENTRATE INTRAVENOUS at 17:52

## 2017-09-21 RX ADMIN — SODIUM CHLORIDE 12.5 MG: 9 INJECTION INTRAMUSCULAR; INTRAVENOUS; SUBCUTANEOUS at 21:33

## 2017-09-21 RX ADMIN — PIPERACILLIN SODIUM,TAZOBACTAM SODIUM 3.38 G: 3; .375 INJECTION, POWDER, FOR SOLUTION INTRAVENOUS at 15:41

## 2017-09-21 RX ADMIN — Medication 20 ML: at 05:44

## 2017-09-21 RX ADMIN — Medication 600 UNITS: at 15:41

## 2017-09-21 RX ADMIN — PIPERACILLIN SODIUM,TAZOBACTAM SODIUM 3.38 G: 3; .375 INJECTION, POWDER, FOR SOLUTION INTRAVENOUS at 22:45

## 2017-09-21 RX ADMIN — SODIUM CHLORIDE 12.5 MG: 9 INJECTION INTRAMUSCULAR; INTRAVENOUS; SUBCUTANEOUS at 07:35

## 2017-09-21 RX ADMIN — Medication 600 UNITS: at 05:44

## 2017-09-21 RX ADMIN — PIPERACILLIN SODIUM,TAZOBACTAM SODIUM 3.38 G: 3; .375 INJECTION, POWDER, FOR SOLUTION INTRAVENOUS at 07:00

## 2017-09-21 RX ADMIN — Medication 600 UNITS: at 21:37

## 2017-09-21 NOTE — PROGRESS NOTES
END OF SHIFT NOTE:    INTAKE/OUTPUT  09/20 0701 - 09/21 0700  In: 6022 [I.V.:1749]  Out: 1025 [Urine:1025]  Voiding: YES  Catheter: NO  Drain:              Flatus: Patient does have flatus present. Stool:  0 occurrences. Characteristics:       Emesis: 0 occurrences. Characteristics:        VITAL SIGNS  Patient Vitals for the past 12 hrs:   Temp Pulse Resp BP SpO2   09/21/17 0400 98.3 °F (36.8 °C) 77 18 115/50 92 %   09/20/17 2000 98.5 °F (36.9 °C) 72 18 130/56 94 %       Pain Assessment  Pain Intensity 1: 0 (09/21/17 0126)  Pain Location 1: Abdomen  Pain Intervention(s) 1: Medication (see MAR)  Patient Stated Pain Goal: 0    Ambulating  Yes    Shift report given to oncoming nurse at the bedside.     Autumn Boyer RN

## 2017-09-21 NOTE — PROGRESS NOTES
Problem: Falls - Risk of  Goal: *Absence of Falls  Document Gia Fall Risk and appropriate interventions in the flowsheet.    Outcome: Progressing Towards Goal  Fall Risk Interventions:  Mobility Interventions: Patient to call before getting OOB           Medication Interventions: Patient to call before getting OOB, Evaluate medications/consider consulting pharmacy, Teach patient to arise slowly     Elimination Interventions: Call light in reach

## 2017-09-21 NOTE — PROGRESS NOTES
H&P/Consult Note/Progress Note/Office Note:   Enio Kent  MRN: [id number]  :1940  Age:76 y.o.    HPI: Enio Kent is a 68 y.o. female who reports a 1 month h/o RLQ pain. This was progressively worsening and associated with intermittent N/V. It was moderate in intensity (8/10) leading up to admission. Nothing makes it better or worse. She was treated previously with laxatives without improvement. She came the [facility] ER about 5 days prior to admission and was treated with antibiotics for UTI. No imaging was done. She came back to the ER on the day of admission and CT scan was obtained as shown below. She is s/p lap francois at [facility] in approx . She is s/p hysterectomy about 40 yrs ago for benign fibroids. She thinks her ovaries remain. She denies associated bloody BMs or diarrhea.        Results for Chandra Coffman (MRN [id number]) as of 2017 09:35   Ref. Range 2017 16:42   CEA Latest Ref Range: 0.0 - 3.0 ng/mL 0.8   CA-125 Latest Ref Range: 1.5 - 35.0 U/mL 5       17 CT abd/pelvis with contrast with IV but not oral contrast   Minimal linear atelectasis or scarring is present in the lung bases,  left greater than right. There is a small to moderate-sized hiatal hernia. No  pleural or pericardial fluid.     Small hypoattenuating lesions are present in the right hepatic lobe and left  kidney, too small to definitively characterize but statistically likely  representing cysts. There is central biliary ductal ectasia status post  cholecystectomy.     The remaining solid abdominal viscera are unremarkable to the limits of  noncontrast CT. Atherosclerotic abdominal aorta and branch vessels. No upper  abdominal no retroperitoneal lymph node enlargement. Nonopacified small bowel  loops are normal in caliber.     Pelvis:  There is somewhat prominent lipomatosis along the left posterior  pararenal fascia and iliopsoas without associated soft tissue component,  measuring up to 12.0 x 23 x 9 cm. The bladder is predominantly decompressed. Uterus not visualized.     Extensive diverticular changes are present in the colon which is otherwise  normal in caliber. There is a large ill-defined inflammatory process in the  right lower quadrant measuring 5.9 x 10.0 x 6.4 cm approximating the ileocecal  junction from which the appendix is not visualized as a distinctly separate  structure. Multiple high attenuation foci are present within or adjacent to the  bowel, suspicious for medication and/or enteroliths. There is moderate adjacent  inflammatory fat stranding and multiple prominent right lower quadrant  mesenteric lymph nodes, the largest of which measures 1.2 cm in short axis.     Trace dependent free pelvic fluid. No aggressive osseous lesions.     IMPRESSION:     1. Nonspecific inflammatory process in the right lower quadrant at the ileocecal  junction. Differential considerations include infectious, and ischemic colitis,  appendicitis, inflammatory bowel disease, omental infarct. Surgical consultation  recommended.     2. Large extraperitoneal predominantly fatty attenuation mass without definite  enhancing soft tissue.  Size places the patient at risk for sarcoma, the inferior  extent of which is not included in the field of view.        9/19/17 Abd pain unchanged; Mass-effect in RLQ; PICC requested; on IV ABx; NPO  9/20/17 PICC placed yesterday; on TPN; Nausea persists;  RLQ pain no better since admission and still 8/10  Requests adult diapers secondary to urinary incontinence  9/21/17 says her pain is 8/10 but looks comofortable        Past Medical History:   Diagnosis Date    Anxiety state, unspecified     GERD (gastroesophageal reflux disease)     Hypercholesterolemia      Past Surgical History:   Procedure Laterality Date    HX CHOLECYSTECTOMY      HX COLONOSCOPY      Dr Steve Mosley 2012, repeat 5 years    HX GYN      hysterectomy, partial    HX KNEE REPLACEMENT      right     Current Facility-Administered Medications   Medication Dose Route Frequency    enoxaparin (LOVENOX) injection 40 mg  40 mg SubCUTAneous Q12H    TPN ADULT - dextrose 10% amino acid 4.25%   IntraVENous QPM    ondansetron (ZOFRAN) injection 4 mg  4 mg IntraVENous Q4H PRN    promethazine (PHENERGAN) with saline injection 12.5 mg  12.5 mg IntraVENous Q4H PRN    HYDROmorphone (PF) (DILAUDID) injection 0.2-0.3 mg  0.2-0.3 mg IntraVENous Q4H PRN    NUTRITIONAL SUPPORT ELECTROLYTE PRN ORDERS   Does Not Apply PRN    fat emulsion 20% (LIPOSYN, INTRALIPID) infusion 250 mL  250 mL IntraVENous QPM    sodium chloride (NS) flush 20 mL  20 mL InterCATHeter Q8H    heparin (porcine) pf 600 Units  600 Units InterCATHeter Q8H    sodium chloride (NS) flush 20 mL  20 mL InterCATHeter PRN    heparin (porcine) pf 600 Units  600 Units InterCATHeter PRN    acetaminophen (TYLENOL) tablet 1,000 mg  1,000 mg Oral Q6H PRN    oxyCODONE IR (ROXICODONE) tablet 5 mg  5 mg Oral Q4H PRN    Or    oxyCODONE IR (ROXICODONE) tablet 10 mg  10 mg Oral Q4H PRN    piperacillin-tazobactam (ZOSYN) 3.375 g in 0.9% sodium chloride (MBP/ADV) 100 mL  3.375 g IntraVENous Q8H     Morphine  Social History     Social History    Marital status:      Spouse name: N/A    Number of children: N/A    Years of education: N/A     Social History Main Topics    Smoking status: Former Smoker     Quit date: 1/1/1975    Smokeless tobacco: Never Used    Alcohol use No    Drug use: No    Sexual activity: Not on file     Other Topics Concern    Not on file     Social History Narrative     History   Smoking Status    Former Smoker    Quit date: 1/1/1975   Smokeless Tobacco    Never Used     Family History   Problem Relation Age of Onset    Diabetes Mother     Heart Disease Mother     Thyroid Disease Mother     Heart Disease Father      ROS: The patient has no difficulty with chest pain or shortness of breath. No fever or chills.   Comprehensive review of systems was otherwise unremarkable except as noted above. Physical Exam:   Visit Vitals    /74    Pulse 68    Temp 98.2 °F (36.8 °C)    Resp 16    Ht 5' 4\" (1.626 m)    Wt 107.1 kg (236 lb 3.2 oz)    SpO2 95%    BMI 40.54 kg/m2     Constitutional: Alert, oriented, cooperative patient in no acute distress; appears stated age    Eyes:Sclera are clear. EOMs intact  ENMT: no external lesions gross hearing normal; no obvious neck masses, no ear or lip lesions, nares normal  CV: RRR. Normal perfusion  Resp: No JVD. Breathing is  non-labored; no audible wheezing. GI: obese; firmness/fullness/mass-effect in RLQ is unchanged since admission; tender to deeper palpation; no guarding    Musculoskeletal: unremarkable with normal function. No embolic signs or cyanosis. Neuro:  Oriented; moves all 4; no focal deficits  Psychiatric: normal affect and mood, no memory impairment    Recent vitals (if inpt):  Patient Vitals for the past 24 hrs:   BP Temp Pulse Resp SpO2   09/21/17 1055 164/74 98.2 °F (36.8 °C) 68 16 95 %   09/21/17 0654 131/69 97.9 °F (36.6 °C) 75 14 97 %   09/21/17 0400 115/50 98.3 °F (36.8 °C) 77 18 92 %   09/20/17 2000 130/56 98.5 °F (36.9 °C) 72 18 94 %       Labs:  Recent Labs      09/21/17   0608   WBC  7.1   HGB  9.5*   PLT  354   NA  142   K  4.5   CL  108*   CO2  30   BUN  12   CREA  0.57*   GLU  116*   TBILI  <0.1*   SGOT  9*   ALT  13   AP  67       Lab Results   Component Value Date/Time    WBC 7.1 09/21/2017 06:08 AM    HGB 9.5 09/21/2017 06:08 AM    PLATELET 871 85/99/5468 06:08 AM    Sodium 142 09/21/2017 06:08 AM    Potassium 4.5 09/21/2017 06:08 AM    Chloride 108 09/21/2017 06:08 AM    CO2 30 09/21/2017 06:08 AM    BUN 12 09/21/2017 06:08 AM    Creatinine 0.57 09/21/2017 06:08 AM    Glucose 116 09/21/2017 06:08 AM    Bilirubin, total <0.1 09/21/2017 06:08 AM    AST (SGOT) 9 09/21/2017 06:08 AM    ALT (SGPT) 13 09/21/2017 06:08 AM    Alk.  phosphatase 67 09/21/2017 06:08 AM Results for Leighann Busch (MRN [id number]) as of 9/19/2017 09:35   Ref. Range 9/18/2017 16:42   CEA Latest Ref Range: 0.0 - 3.0 ng/mL 0.8   CA-125 Latest Ref Range: 1.5 - 35.0 U/mL 5         I reviewed recent labs and recent radiologic studies. I independently reviewed radiology images for studies I described above or studies I have ordered.    Admission date (for inpatients): 9/18/2017   * No surgery found *  * No surgery found *    ASSESSMENT/PLAN:  Problem List  Date Reviewed: 9/5/2017          Codes Class Noted    Acute cystitis ICD-10-CM: N30.00  ICD-9-CM: 595.0  9/19/2017        RLQ abdominal mass ICD-10-CM: R19.03  ICD-9-CM: 789.33  9/19/2017        RLQ abdominal pain ICD-10-CM: R10.31  ICD-9-CM: 789.03  9/19/2017        Leukocytosis ICD-10-CM: D72.829  ICD-9-CM: 288.60  9/19/2017        Non-intractable vomiting with nausea ICD-10-CM: R11.2  ICD-9-CM: 787.01  9/19/2017        * (Principal)Intra-abdominal infection ICD-10-CM: B99.9  ICD-9-CM: 136.9  9/18/2017        Anxiety state, unspecified ICD-10-CM: F41.1  ICD-9-CM: 300.00  Unknown        GERD (gastroesophageal reflux disease) ICD-10-CM: K21.9  ICD-9-CM: 530.81  Unknown        Hypercholesterolemia ICD-10-CM: E78.00  ICD-9-CM: 272.0  Unknown            Principal Problem:    Intra-abdominal infection (9/18/2017)    Active Problems:    Acute cystitis (9/19/2017)      RLQ abdominal mass (9/19/2017)      RLQ abdominal pain (9/19/2017)      Leukocytosis (9/19/2017)      Non-intractable vomiting with nausea (9/19/2017)       RLQ phlegmon involving bowel with RLQ pain, N/V    Differential Dx  Ruptured (delayed presentation) appendictis with RLQ phelgmon  Malignancy (carcinoma, sarcoma, ovarian carcinoma)  Epiploicae Appendagitis  IBD  Diverticulitis  Ischemic Enteritis  Infectious Enteritis    In additon to left retroperitoneal mass Rule out liposarcoma    IV abx for RLQ infected phlegmon/mass and UTI  CEA and  normal  Stool cultures   PICC line/TPN  Expect prolonged bowel rest  NPO  Adult diaper for urinary incontinence - Pt couldn't make it to BR and wet bed  IV narcotic prn pain  IV Zofran/phenergan for nausea prn     Repeat CT on Monday (after 4 more days bowel rest and IV Abx)  She may need IR drainage on follow-up imaging if abscess develops or possible perc biopsy.     GI (pepcid- add to TPN today) and VTE (lovenox) prophylaxis ordered            Signed:  Tiffany Addison MD,  FACS

## 2017-09-21 NOTE — PROGRESS NOTES
Problem: Nutrition Deficit  Goal: *Optimize nutritional status  Nutrition F/U: TPN Management- Reviewed, no change to TPN at this time.    Lenore Mckenzie, [address] [place] [address], [address] [place] [address]

## 2017-09-22 PROBLEM — R19.00 RETROPERITONEAL MASS: Status: ACTIVE | Noted: 2017-09-22

## 2017-09-22 LAB
ALBUMIN SERPL-MCNC: 2.5 G/DL (ref 3.2–4.6)
ALBUMIN/GLOB SERPL: 0.6 {RATIO} (ref 1.2–3.5)
ALP SERPL-CCNC: 73 U/L (ref 50–136)
ALT SERPL-CCNC: 13 U/L (ref 12–65)
ANION GAP SERPL CALC-SCNC: 4 MMOL/L (ref 7–16)
AST SERPL-CCNC: 9 U/L (ref 15–37)
BILIRUB SERPL-MCNC: 0.2 MG/DL (ref 0.2–1.1)
BUN SERPL-MCNC: 12 MG/DL (ref 8–23)
CALCIUM SERPL-MCNC: 8.5 MG/DL (ref 8.3–10.4)
CHLORIDE SERPL-SCNC: 106 MMOL/L (ref 98–107)
CO2 SERPL-SCNC: 31 MMOL/L (ref 21–32)
CREAT SERPL-MCNC: 0.56 MG/DL (ref 0.6–1)
ERYTHROCYTE [DISTWIDTH] IN BLOOD BY AUTOMATED COUNT: 15.9 % (ref 11.9–14.6)
GLOBULIN SER CALC-MCNC: 3.9 G/DL (ref 2.3–3.5)
GLUCOSE SERPL-MCNC: 96 MG/DL (ref 65–100)
HCT VFR BLD AUTO: 32.4 % (ref 35.8–46.3)
HGB BLD-MCNC: 9.8 G/DL (ref 11.7–15.4)
MAGNESIUM SERPL-MCNC: 2.1 MG/DL (ref 1.8–2.4)
MCH RBC QN AUTO: 24.7 PG (ref 26.1–32.9)
MCHC RBC AUTO-ENTMCNC: 30.2 G/DL (ref 31.4–35)
MCV RBC AUTO: 81.8 FL (ref 79.6–97.8)
PHOSPHATE SERPL-MCNC: 3.5 MG/DL (ref 2.3–3.7)
PLATELET # BLD AUTO: 356 K/UL (ref 150–450)
PMV BLD AUTO: 9.7 FL (ref 10.8–14.1)
POTASSIUM SERPL-SCNC: 4.1 MMOL/L (ref 3.5–5.1)
PROT SERPL-MCNC: 6.4 G/DL (ref 6.3–8.2)
RBC # BLD AUTO: 3.96 M/UL (ref 4.05–5.25)
SODIUM SERPL-SCNC: 141 MMOL/L (ref 136–145)
WBC # BLD AUTO: 5.6 K/UL (ref 4.3–11.1)

## 2017-09-22 PROCEDURE — 85027 COMPLETE CBC AUTOMATED: CPT | Performed by: SURGERY

## 2017-09-22 PROCEDURE — 36592 COLLECT BLOOD FROM PICC: CPT

## 2017-09-22 PROCEDURE — 74011000258 HC RX REV CODE- 258: Performed by: SURGERY

## 2017-09-22 PROCEDURE — 74011250636 HC RX REV CODE- 250/636: Performed by: SURGERY

## 2017-09-22 PROCEDURE — 74011000250 HC RX REV CODE- 250: Performed by: SURGERY

## 2017-09-22 PROCEDURE — 84100 ASSAY OF PHOSPHORUS: CPT | Performed by: SURGERY

## 2017-09-22 PROCEDURE — 65270000029 HC RM PRIVATE

## 2017-09-22 PROCEDURE — 83735 ASSAY OF MAGNESIUM: CPT | Performed by: SURGERY

## 2017-09-22 PROCEDURE — 80053 COMPREHEN METABOLIC PANEL: CPT | Performed by: SURGERY

## 2017-09-22 PROCEDURE — 74011250637 HC RX REV CODE- 250/637: Performed by: NURSE PRACTITIONER

## 2017-09-22 PROCEDURE — 74011250637 HC RX REV CODE- 250/637: Performed by: SURGERY

## 2017-09-22 RX ORDER — CITALOPRAM 20 MG/1
10 TABLET, FILM COATED ORAL DAILY
Status: DISCONTINUED | OUTPATIENT
Start: 2017-09-22 | End: 2017-10-04

## 2017-09-22 RX ADMIN — PIPERACILLIN SODIUM,TAZOBACTAM SODIUM 3.38 G: 3; .375 INJECTION, POWDER, FOR SOLUTION INTRAVENOUS at 22:05

## 2017-09-22 RX ADMIN — SODIUM CHLORIDE 12.5 MG: 9 INJECTION INTRAMUSCULAR; INTRAVENOUS; SUBCUTANEOUS at 16:55

## 2017-09-22 RX ADMIN — OXYCODONE HYDROCHLORIDE 10 MG: 5 TABLET ORAL at 22:02

## 2017-09-22 RX ADMIN — Medication 20 ML: at 05:21

## 2017-09-22 RX ADMIN — SODIUM CHLORIDE 12.5 MG: 9 INJECTION INTRAMUSCULAR; INTRAVENOUS; SUBCUTANEOUS at 10:22

## 2017-09-22 RX ADMIN — Medication 20 ML: at 22:07

## 2017-09-22 RX ADMIN — ACETAMINOPHEN 1000 MG: 500 TABLET, FILM COATED ORAL at 08:04

## 2017-09-22 RX ADMIN — SOYBEAN OIL 250 ML: 20 INJECTION, SOLUTION INTRAVENOUS at 18:35

## 2017-09-22 RX ADMIN — PIPERACILLIN SODIUM,TAZOBACTAM SODIUM 3.38 G: 3; .375 INJECTION, POWDER, FOR SOLUTION INTRAVENOUS at 15:50

## 2017-09-22 RX ADMIN — CITALOPRAM HYDROBROMIDE 10 MG: 20 TABLET ORAL at 11:39

## 2017-09-22 RX ADMIN — Medication 600 UNITS: at 05:17

## 2017-09-22 RX ADMIN — ENOXAPARIN SODIUM 40 MG: 40 INJECTION SUBCUTANEOUS at 08:04

## 2017-09-22 RX ADMIN — ENOXAPARIN SODIUM 40 MG: 40 INJECTION SUBCUTANEOUS at 20:22

## 2017-09-22 RX ADMIN — PIPERACILLIN SODIUM,TAZOBACTAM SODIUM 3.38 G: 3; .375 INJECTION, POWDER, FOR SOLUTION INTRAVENOUS at 06:00

## 2017-09-22 RX ADMIN — POTASSIUM CHLORIDE: 2 INJECTION, SOLUTION, CONCENTRATE INTRAVENOUS at 18:35

## 2017-09-22 NOTE — PROGRESS NOTES
Problem: Nutrition Deficit  Goal: *Optimize nutritional status  Nutrition F/U  TPN management (Dr. Mary Jo Gunderson)  Assessment:   · Diet order(s): 9-18: NPO  · Central line access: Double lumen PICC  · TPN dependent d/t RLQ infected phlegmon/mass with pain N/V. Plan for repeat CT on 9-25  · Macronutrient needs:  EER:  9444-5372 kcal /day (13-18 kcal/kg actual BW)  Revised EPR:  65-82 grams protein/day (1.2-1.5 grams/kg IBW). Dr Julio Jason standard request is 2 gm/kg VRL=128 g PRO/d)  Max CHO:  314 grams/day (4mg/kg IBW/min)  Fluid:  1ml/kcal  Intake/Comparative Standards: 2L/d of 10%DEX/ 4.25%AA at 85 ml/hr with 250 ml 20% Lipids daily provides 1520 kcal/d (100% of needs), 85 grams of protein/d (1.56 grams of protein/kg of IBW), 200 grams of CHO/d (does not exceed maximum CHO load) and ~2300 ml of total volume/d (100% of needs). Intervention:   Meals and snacks: NPO  Nutritional Supplement Therapy: Electrolyte replacement per nutritional support protocols are active on MAR. PN:   1. Continue current TPN.   2. If 2 grams protein/kg is desired for this pt, this could be met with 10%DEX/ 4.25%AA at 105 ml/hr with 250 ml 20% Lipids daily provides 1785 kcal/d (100% of needs), 107 grams of protein/d (1.96 grams of protein/kg of IBW), 252 grams of CHO/d (does not exceed maximum CHO load) and ~2770 ml of total volume/d (>100% of needs) vs 2L/d of 15%DEX/5%AA at 85 ml/hr with 250 ml 20% Lipids daily provides 1920 kcal/d (85% of needs), 100 grams of protein/d (1.83 grams of protein/kg of IBW),300 grams of CHO/d (does not exceed maximum CHO load) and ~2300 ml of total volume/d (>100% of needs)      Kamilla Blackwell, RD, LD, CNSC [phone]

## 2017-09-22 NOTE — PROGRESS NOTES
Per chart review patient is currently admitted to facility. CC will close case due to current hospital admission. This note will not be viewable in [address]

## 2017-09-22 NOTE — PROGRESS NOTES
H&P/Consult Note/Progress Note/Office Note:   Baldemar Sherman  MRN: [id number]  :1940  Age:76 y.o.    HPI: Baldemar Sherman is a 68 y.o. female who reports a 1 month h/o RLQ pain. This was progressively worsening and associated with intermittent N/V. It was moderate in intensity (8/10) leading up to admission. Nothing makes it better or worse. She was treated previously with laxatives without improvement. She came the [facility] ER about 5 days prior to admission and was treated with antibiotics for UTI. No imaging was done. She came back to the ER on the day of admission and CT scan was obtained as shown below. She is s/p lap francois at [facility] in approx . She is s/p hysterectomy about 40 yrs ago for benign fibroids. She thinks her ovaries remain. She denies associated bloody BMs or diarrhea.        Results for Yissel David (MRN [id number]) as of 2017 09:35   Ref. Range 2017 16:42   CEA Latest Ref Range: 0.0 - 3.0 ng/mL 0.8   CA-125 Latest Ref Range: 1.5 - 35.0 U/mL 5       17 CT abd/pelvis with contrast with IV but not oral contrast   Minimal linear atelectasis or scarring is present in the lung bases,  left greater than right. There is a small to moderate-sized hiatal hernia. No  pleural or pericardial fluid.     Small hypoattenuating lesions are present in the right hepatic lobe and left  kidney, too small to definitively characterize but statistically likely  representing cysts. There is central biliary ductal ectasia status post  cholecystectomy.     The remaining solid abdominal viscera are unremarkable to the limits of  noncontrast CT. Atherosclerotic abdominal aorta and branch vessels. No upper  abdominal no retroperitoneal lymph node enlargement. Nonopacified small bowel  loops are normal in caliber.     Pelvis:  There is somewhat prominent lipomatosis along the left posterior  pararenal fascia and iliopsoas without associated soft tissue component,  measuring up to 12.0 x 23 x 9 cm. The bladder is predominantly decompressed. Uterus not visualized.     Extensive diverticular changes are present in the colon which is otherwise  normal in caliber. There is a large ill-defined inflammatory process in the  right lower quadrant measuring 5.9 x 10.0 x 6.4 cm approximating the ileocecal  junction from which the appendix is not visualized as a distinctly separate  structure. Multiple high attenuation foci are present within or adjacent to the  bowel, suspicious for medication and/or enteroliths. There is moderate adjacent  inflammatory fat stranding and multiple prominent right lower quadrant  mesenteric lymph nodes, the largest of which measures 1.2 cm in short axis.     Trace dependent free pelvic fluid. No aggressive osseous lesions.     IMPRESSION:     1. Nonspecific inflammatory process in the right lower quadrant at the ileocecal  junction. Differential considerations include infectious, and ischemic colitis,  appendicitis, inflammatory bowel disease, omental infarct. Surgical consultation  recommended.     2. Large extraperitoneal predominantly fatty attenuation mass without definite  enhancing soft tissue.  Size places the patient at risk for sarcoma, the inferior  extent of which is not included in the field of view.        9/19/17 Abd pain unchanged; Mass-effect in RLQ; PICC requested; on IV ABx; NPO  9/20/17 PICC placed yesterday; on TPN; Nausea persists;  RLQ pain no better since admission and still 8/10  Requests adult diapers secondary to urinary incontinence  9/21/17 says her pain is 8/10 but looks comfortable  9/22/17 pain issues improving        Past Medical History:   Diagnosis Date    Anxiety state, unspecified     GERD (gastroesophageal reflux disease)     Hypercholesterolemia      Past Surgical History:   Procedure Laterality Date    HX CHOLECYSTECTOMY      HX COLONOSCOPY      Dr Karen Olmos 2012, repeat 5 years    HX GYN      hysterectomy, partial    HX KNEE REPLACEMENT      right     Current Facility-Administered Medications   Medication Dose Route Frequency    enoxaparin (LOVENOX) injection 40 mg  40 mg SubCUTAneous Q12H    TPN ADULT - dextrose 10% amino acid 4.25%   IntraVENous QPM    ondansetron (ZOFRAN) injection 4 mg  4 mg IntraVENous Q4H PRN    promethazine (PHENERGAN) with saline injection 12.5 mg  12.5 mg IntraVENous Q4H PRN    HYDROmorphone (PF) (DILAUDID) injection 0.2-0.3 mg  0.2-0.3 mg IntraVENous Q4H PRN    NUTRITIONAL SUPPORT ELECTROLYTE PRN ORDERS   Does Not Apply PRN    fat emulsion 20% (LIPOSYN, INTRALIPID) infusion 250 mL  250 mL IntraVENous QPM    sodium chloride (NS) flush 20 mL  20 mL InterCATHeter Q8H    heparin (porcine) pf 600 Units  600 Units InterCATHeter Q8H    sodium chloride (NS) flush 20 mL  20 mL InterCATHeter PRN    heparin (porcine) pf 600 Units  600 Units InterCATHeter PRN    acetaminophen (TYLENOL) tablet 1,000 mg  1,000 mg Oral Q6H PRN    oxyCODONE IR (ROXICODONE) tablet 5 mg  5 mg Oral Q4H PRN    Or    oxyCODONE IR (ROXICODONE) tablet 10 mg  10 mg Oral Q4H PRN    piperacillin-tazobactam (ZOSYN) 3.375 g in 0.9% sodium chloride (MBP/ADV) 100 mL  3.375 g IntraVENous Q8H     Morphine  Social History     Social History    Marital status:      Spouse name: N/A    Number of children: N/A    Years of education: N/A     Social History Main Topics    Smoking status: Former Smoker     Quit date: 1/1/1975    Smokeless tobacco: Never Used    Alcohol use No    Drug use: No    Sexual activity: Not on file     Other Topics Concern    Not on file     Social History Narrative     History   Smoking Status    Former Smoker    Quit date: 1/1/1975   Smokeless Tobacco    Never Used     Family History   Problem Relation Age of Onset    Diabetes Mother     Heart Disease Mother     Thyroid Disease Mother     Heart Disease Father      ROS: The patient has no difficulty with chest pain or shortness of breath.   No fever or chills. Comprehensive review of systems was otherwise unremarkable except as noted above. Physical Exam:   Visit Vitals    /58    Pulse 71    Temp 98 °F (36.7 °C)    Resp 16    Ht 5' 4\" (1.626 m)    Wt 110.7 kg (244 lb)    SpO2 95%    BMI 41.88 kg/m2     Constitutional: Alert, oriented, cooperative patient in no acute distress; appears stated age    Eyes:Sclera are clear. EOMs intact  ENMT: no external lesions gross hearing normal; no obvious neck masses, no ear or lip lesions, nares normal  CV: RRR. Normal perfusion  Resp: No JVD. Breathing is  non-labored; no audible wheezing. GI: obese; firmness/fullness/mass-effect in RLQ is unchanged since admission; tender to deeper palpation; no guarding    Musculoskeletal: unremarkable with normal function. No embolic signs or cyanosis.    Neuro:  Oriented; moves all 4; no focal deficits  Psychiatric: normal affect and mood, no memory impairment    Recent vitals (if inpt):  Patient Vitals for the past 24 hrs:   BP Temp Pulse Resp SpO2 Weight   09/22/17 0326 119/58 98 °F (36.7 °C) 71 16 95 % -   09/22/17 0143 - - - - - 110.7 kg (244 lb)   09/21/17 2254 136/74 98 °F (36.7 °C) 86 16 95 % -   09/21/17 2000 140/81 98.8 °F (37.1 °C) 69 16 94 % -   09/21/17 1500 128/82 97.7 °F (36.5 °C) 68 16 97 % -   09/21/17 1055 164/74 98.2 °F (36.8 °C) 68 16 95 % -       Labs:  Recent Labs      09/22/17   0426   WBC  5.6   HGB  9.8*   PLT  356   NA  141   K  4.1   CL  106   CO2  31   BUN  12   CREA  0.56*   GLU  96   TBILI  0.2   SGOT  9*   ALT  13   AP  73       Lab Results   Component Value Date/Time    WBC 5.6 09/22/2017 04:26 AM    HGB 9.8 09/22/2017 04:26 AM    PLATELET 839 39/91/8909 04:26 AM    Sodium 141 09/22/2017 04:26 AM    Potassium 4.1 09/22/2017 04:26 AM    Chloride 106 09/22/2017 04:26 AM    CO2 31 09/22/2017 04:26 AM    BUN 12 09/22/2017 04:26 AM    Creatinine 0.56 09/22/2017 04:26 AM    Glucose 96 09/22/2017 04:26 AM    Bilirubin, total 0.2 09/22/2017 04:26 AM    AST (SGOT) 9 09/22/2017 04:26 AM    ALT (SGPT) 13 09/22/2017 04:26 AM    Alk. phosphatase 73 09/22/2017 04:26 AM       Results for Jason Gay (MRN [id number]) as of 9/19/2017 09:35   Ref. Range 9/18/2017 16:42   CEA Latest Ref Range: 0.0 - 3.0 ng/mL 0.8   CA-125 Latest Ref Range: 1.5 - 35.0 U/mL 5         I reviewed recent labs and recent radiologic studies. I independently reviewed radiology images for studies I described above or studies I have ordered.    Admission date (for inpatients): 9/18/2017   * No surgery found *  * No surgery found *    ASSESSMENT/PLAN:  Problem List  Date Reviewed: 9/5/2017          Codes Class Noted    Massive Left Retroperitoneal fatty mass seen on CT scan extending from kidney to groin ICD-10-CM: R19.00  ICD-9-CM: 789.30  9/22/2017        Acute cystitis ICD-10-CM: N30.00  ICD-9-CM: 595.0  9/19/2017        RLQ abdominal mass ICD-10-CM: R19.03  ICD-9-CM: 789.33  9/19/2017        RLQ abdominal pain ICD-10-CM: R10.31  ICD-9-CM: 789.03  9/19/2017        Leukocytosis ICD-10-CM: D72.829  ICD-9-CM: 288.60  9/19/2017        Non-intractable vomiting with nausea ICD-10-CM: R11.2  ICD-9-CM: 787.01  9/19/2017        * (Principal)Intra-abdominal infection ICD-10-CM: B99.9  ICD-9-CM: 136.9  9/18/2017        Anxiety state, unspecified ICD-10-CM: F41.1  ICD-9-CM: 300.00  Unknown        GERD (gastroesophageal reflux disease) ICD-10-CM: K21.9  ICD-9-CM: 530.81  Unknown        Hypercholesterolemia ICD-10-CM: E78.00  ICD-9-CM: 272.0  Unknown            Principal Problem:    Intra-abdominal infection (9/18/2017)    Active Problems:    Acute cystitis (9/19/2017)      RLQ abdominal mass (9/19/2017)      RLQ abdominal pain (9/19/2017)      Leukocytosis (9/19/2017)      Non-intractable vomiting with nausea (9/19/2017)      Massive Left Retroperitoneal fatty mass seen on CT scan extending from kidney to groin (9/22/2017)           RLQ phlegmon involving bowel with RLQ pain, N/V and delayed presentation and 1 month prodrome  NPO/Bowel rest, TPN via PICC  IV Zosyn   Repeat CT on Monday is ordered-->She may need IR drainage on follow-up imaging if abscess develops (or possible perc biopsy)  Stool cultures to rule out infectious enteritis not listed in results section yet    Differential Dx  Ruptured (delayed presentation) appendictis with RLQ phelgmon  Malignancy (carcinoma, sarcoma, ovarian carcinoma)  Epiploicae Appendagitis  IBD  Diverticulitis  Ischemic Enteritis  Infectious Enteritis     In addition, there is also a left retroperitoneal infiltrative fatty mass-->rule out liposarcoma  Further workup for this on hold until we get resolution/stability of the RLQ inflammatory process  CEA and  were normal      Adult diaper for urinary incontinence - Pt couldn't make it to BR and wet bed  IV narcotic prn pain  IV Zofran/phenergan for nausea prn  GI (pepcid- add to TPN today) and VTE (lovenox) prophylaxis ordered        Signed:  Jaquelin Howard MD,  FACS

## 2017-09-22 NOTE — PROGRESS NOTES
END OF SHIFT NOTE:    INTAKE/OUTPUT  09/21 0701 - 09/22 0700  In: 4212 [I.V.:1358]  Out: 1300 [Urine:1300]  Voiding: YES  Catheter: NO  Drain:              Flatus: Patient does have flatus present. Stool:  0 occurrences. Characteristics:       Emesis: 0 occurrences. Characteristics:        VITAL SIGNS  Patient Vitals for the past 12 hrs:   Temp Pulse Resp BP SpO2   09/22/17 0326 98 °F (36.7 °C) 71 16 119/58 95 %   09/21/17 2254 98 °F (36.7 °C) 86 16 136/74 95 %   09/21/17 2000 98.8 °F (37.1 °C) 69 16 140/81 94 %       Pain Assessment  Pain Intensity 1: 0 (09/21/17 2254)  Pain Location 1: Abdomen  Pain Intervention(s) 1: Medication (see MAR)  Patient Stated Pain Goal: 0    Ambulating  Yes    Shift report given to oncoming nurse at the bedside.     Vinny Mcclain RN

## 2017-09-23 LAB
ALBUMIN SERPL-MCNC: 2.3 G/DL (ref 3.2–4.6)
ALBUMIN/GLOB SERPL: 0.5 {RATIO} (ref 1.2–3.5)
ALP SERPL-CCNC: 74 U/L (ref 50–136)
ALT SERPL-CCNC: 17 U/L (ref 12–65)
ANION GAP SERPL CALC-SCNC: 3 MMOL/L (ref 7–16)
AST SERPL-CCNC: 16 U/L (ref 15–37)
BILIRUB SERPL-MCNC: 0.1 MG/DL (ref 0.2–1.1)
BUN SERPL-MCNC: 12 MG/DL (ref 8–23)
CALCIUM SERPL-MCNC: 8.5 MG/DL (ref 8.3–10.4)
CHLORIDE SERPL-SCNC: 107 MMOL/L (ref 98–107)
CO2 SERPL-SCNC: 31 MMOL/L (ref 21–32)
CREAT SERPL-MCNC: 0.58 MG/DL (ref 0.6–1)
ERYTHROCYTE [DISTWIDTH] IN BLOOD BY AUTOMATED COUNT: 15.8 % (ref 11.9–14.6)
GLOBULIN SER CALC-MCNC: 4.3 G/DL (ref 2.3–3.5)
GLUCOSE SERPL-MCNC: 99 MG/DL (ref 65–100)
HCT VFR BLD AUTO: 33.4 % (ref 35.8–46.3)
HGB BLD-MCNC: 9.8 G/DL (ref 11.7–15.4)
MCH RBC QN AUTO: 24.4 PG (ref 26.1–32.9)
MCHC RBC AUTO-ENTMCNC: 29.3 G/DL (ref 31.4–35)
MCV RBC AUTO: 83.1 FL (ref 79.6–97.8)
PLATELET # BLD AUTO: 358 K/UL (ref 150–450)
PMV BLD AUTO: 9.7 FL (ref 10.8–14.1)
POTASSIUM SERPL-SCNC: 4 MMOL/L (ref 3.5–5.1)
PROT SERPL-MCNC: 6.6 G/DL (ref 6.3–8.2)
RBC # BLD AUTO: 4.02 M/UL (ref 4.05–5.25)
SODIUM SERPL-SCNC: 141 MMOL/L (ref 136–145)
WBC # BLD AUTO: 5.9 K/UL (ref 4.3–11.1)

## 2017-09-23 PROCEDURE — 74011000250 HC RX REV CODE- 250: Performed by: SURGERY

## 2017-09-23 PROCEDURE — 74011250637 HC RX REV CODE- 250/637: Performed by: SURGERY

## 2017-09-23 PROCEDURE — 74011250637 HC RX REV CODE- 250/637: Performed by: NURSE PRACTITIONER

## 2017-09-23 PROCEDURE — 85027 COMPLETE CBC AUTOMATED: CPT | Performed by: SURGERY

## 2017-09-23 PROCEDURE — 74011000258 HC RX REV CODE- 258: Performed by: SURGERY

## 2017-09-23 PROCEDURE — 74011250636 HC RX REV CODE- 250/636: Performed by: SURGERY

## 2017-09-23 PROCEDURE — 65270000029 HC RM PRIVATE

## 2017-09-23 PROCEDURE — 80053 COMPREHEN METABOLIC PANEL: CPT | Performed by: SURGERY

## 2017-09-23 PROCEDURE — 36592 COLLECT BLOOD FROM PICC: CPT

## 2017-09-23 RX ADMIN — ACETAMINOPHEN 1000 MG: 500 TABLET, FILM COATED ORAL at 09:13

## 2017-09-23 RX ADMIN — ENOXAPARIN SODIUM 40 MG: 40 INJECTION SUBCUTANEOUS at 09:09

## 2017-09-23 RX ADMIN — PIPERACILLIN SODIUM,TAZOBACTAM SODIUM 3.38 G: 3; .375 INJECTION, POWDER, FOR SOLUTION INTRAVENOUS at 22:06

## 2017-09-23 RX ADMIN — POTASSIUM CHLORIDE: 2 INJECTION, SOLUTION, CONCENTRATE INTRAVENOUS at 18:00

## 2017-09-23 RX ADMIN — CITALOPRAM HYDROBROMIDE 10 MG: 20 TABLET ORAL at 09:09

## 2017-09-23 RX ADMIN — OXYCODONE HYDROCHLORIDE 10 MG: 5 TABLET ORAL at 14:16

## 2017-09-23 RX ADMIN — OXYCODONE HYDROCHLORIDE 10 MG: 5 TABLET ORAL at 22:03

## 2017-09-23 RX ADMIN — PIPERACILLIN SODIUM,TAZOBACTAM SODIUM 3.38 G: 3; .375 INJECTION, POWDER, FOR SOLUTION INTRAVENOUS at 14:15

## 2017-09-23 RX ADMIN — ENOXAPARIN SODIUM 40 MG: 40 INJECTION SUBCUTANEOUS at 19:46

## 2017-09-23 RX ADMIN — SOYBEAN OIL 250 ML: 20 INJECTION, SOLUTION INTRAVENOUS at 17:34

## 2017-09-23 RX ADMIN — Medication 20 ML: at 04:15

## 2017-09-23 RX ADMIN — Medication 600 UNITS: at 04:15

## 2017-09-23 RX ADMIN — PIPERACILLIN SODIUM,TAZOBACTAM SODIUM 3.38 G: 3; .375 INJECTION, POWDER, FOR SOLUTION INTRAVENOUS at 06:02

## 2017-09-23 NOTE — PROGRESS NOTES
H&P/Consult Note/Progress Note/Office Note:   Madalyn Pallas  MRN: [id number]  :1940  Age:76 y.o.    HPI: Madalyn Pallas is a 68 y.o. female who reports a 1 month h/o RLQ pain.       This was progressively worsening and associated with intermittent N/V. It was moderate in intensity (8/10) leading up to admission. Nothing makes it better or worse. She was treated previously with laxatives without improvement. She came the [facility] ER about 5 days prior to admission and was treated with antibiotics for UTI. No imaging was done. She came back to the ER on the day of admission and CT scan was obtained as shown below. She is s/p lap francois at [facility] in approx . She is s/p hysterectomy about 40 yrs ago for benign fibroids. She thinks her ovaries remain. She denies associated bloody BMs or diarrhea.          Results for Americo Nicole (MRN [id number]) as of 2017 09:35    Ref. Range 2017 16:42   CEA Latest Ref Range: 0.0 - 3.0 ng/mL 0.8   CA-125 Latest Ref Range: 1.5 - 35.0 U/mL 5         17 CT abd/pelvis with contrast with IV but not oral contrast   Minimal linear atelectasis or scarring is present in the lung bases,  left greater than right. There is a small to moderate-sized hiatal hernia. No  pleural or pericardial fluid.      Small hypoattenuating lesions are present in the right hepatic lobe and left  kidney, too small to definitively characterize but statistically likely  representing cysts. There is central biliary ductal ectasia status post  cholecystectomy.      The remaining solid abdominal viscera are unremarkable to the limits of  noncontrast CT. Atherosclerotic abdominal aorta and branch vessels. No upper  abdominal no retroperitoneal lymph node enlargement. Nonopacified small bowel  loops are normal in caliber.      Pelvis:  There is somewhat prominent lipomatosis along the left posterior  pararenal fascia and iliopsoas without associated soft tissue component,  measuring up to 12.0 x 23 x 9 cm. The bladder is predominantly decompressed. Uterus not visualized.      Extensive diverticular changes are present in the colon which is otherwise  normal in caliber. There is a large ill-defined inflammatory process in the  right lower quadrant measuring 5.9 x 10.0 x 6.4 cm approximating the ileocecal  junction from which the appendix is not visualized as a distinctly separate  structure. Multiple high attenuation foci are present within or adjacent to the  bowel, suspicious for medication and/or enteroliths. There is moderate adjacent  inflammatory fat stranding and multiple prominent right lower quadrant  mesenteric lymph nodes, the largest of which measures 1.2 cm in short axis.      Trace dependent free pelvic fluid. No aggressive osseous lesions.      IMPRESSION:      1. Nonspecific inflammatory process in the right lower quadrant at the ileocecal  junction. Differential considerations include infectious, and ischemic colitis,  appendicitis, inflammatory bowel disease, omental infarct. Surgical consultation  recommended.      2. Large extraperitoneal predominantly fatty attenuation mass without definite  enhancing soft tissue. Size places the patient at risk for sarcoma, the inferior  extent of which is not included in the field of view.          9/19/17 Abd pain unchanged; Mass-effect in RLQ; PICC requested; on IV ABx; NPO  9/20/17 PICC placed yesterday; on TPN; Nausea persists;  RLQ pain no better since admission and still 8/10  Requests adult diapers secondary to urinary incontinence  9/21/17 says her pain is 8/10 but looks comfortable  9/22/17 pain issues improving  9/23/17Awake in bed.  No new complaints.          Past Medical History:   Diagnosis Date    Anxiety state, unspecified     GERD (gastroesophageal reflux disease)     Hypercholesterolemia      Past Surgical History:   Procedure Laterality Date    HX CHOLECYSTECTOMY      HX COLONOSCOPY      Dr Delgado Marking 2012, repeat 5 years  HX GYN      hysterectomy, partial    HX KNEE REPLACEMENT      right     Current Facility-Administered Medications   Medication Dose Route Frequency    citalopram (CELEXA) tablet 10 mg  10 mg Oral DAILY    enoxaparin (LOVENOX) injection 40 mg  40 mg SubCUTAneous Q12H    TPN ADULT - dextrose 10% amino acid 4.25%   IntraVENous QPM    ondansetron (ZOFRAN) injection 4 mg  4 mg IntraVENous Q4H PRN    promethazine (PHENERGAN) with saline injection 12.5 mg  12.5 mg IntraVENous Q4H PRN    HYDROmorphone (PF) (DILAUDID) injection 0.2-0.3 mg  0.2-0.3 mg IntraVENous Q4H PRN    NUTRITIONAL SUPPORT ELECTROLYTE PRN ORDERS   Does Not Apply PRN    fat emulsion 20% (LIPOSYN, INTRALIPID) infusion 250 mL  250 mL IntraVENous QPM    sodium chloride (NS) flush 20 mL  20 mL InterCATHeter Q8H    heparin (porcine) pf 600 Units  600 Units InterCATHeter Q8H    sodium chloride (NS) flush 20 mL  20 mL InterCATHeter PRN    heparin (porcine) pf 600 Units  600 Units InterCATHeter PRN    acetaminophen (TYLENOL) tablet 1,000 mg  1,000 mg Oral Q6H PRN    oxyCODONE IR (ROXICODONE) tablet 5 mg  5 mg Oral Q4H PRN    Or    oxyCODONE IR (ROXICODONE) tablet 10 mg  10 mg Oral Q4H PRN    piperacillin-tazobactam (ZOSYN) 3.375 g in 0.9% sodium chloride (MBP/ADV) 100 mL  3.375 g IntraVENous Q8H     Morphine  Social History     Social History    Marital status:      Spouse name: N/A    Number of children: N/A    Years of education: N/A     Social History Main Topics    Smoking status: Former Smoker     Quit date: 1/1/1975    Smokeless tobacco: Never Used    Alcohol use No    Drug use: No    Sexual activity: Not on file     Other Topics Concern    Not on file     Social History Narrative     History   Smoking Status    Former Smoker    Quit date: 1/1/1975   Smokeless Tobacco    Never Used     Family History   Problem Relation Age of Onset    Diabetes Mother     Heart Disease Mother     Thyroid Disease Mother     Heart Disease Father      ROS: Comprehensive review of systems was otherwise unremarkable except as noted above. Physical Exam:   Visit Vitals    /74    Pulse 70    Temp 97.8 °F (36.6 °C)    Resp 17    Ht 5' 4\" (1.626 m)    Wt 240 lb (108.9 kg)    SpO2 97%    BMI 41.2 kg/m2     Constitutional: Alert, oriented, cooperative patient in no acute distress; appears stated age    Eyes:Sclera are clear. EOMs intact  ENMT: no external lesions gross hearing normal; no obvious neck masses, no ear or lip lesions, nares normal  CV: RRR. Normal perfusion  Resp: No JVD. Breathing is  non-labored; no audible wheezing. GI: obese; firmness/fullness/mass-effect in RLQ is unchanged since admission; tender to deeper palpation; no guarding    Musculoskeletal: unremarkable with normal function. No embolic signs or cyanosis.    Neuro:  Oriented; moves all 4; no focal deficits  Psychiatric: normal affect and mood, no memory impairment    Recent vitals (if inpt):  Patient Vitals for the past 24 hrs:   BP Temp Pulse Resp SpO2 Weight   09/23/17 1500 165/74 97.8 °F (36.6 °C) 70 17 97 % -   09/23/17 1100 150/68 98.1 °F (36.7 °C) 64 17 96 % -   09/23/17 0650 140/72 97.6 °F (36.4 °C) 76 17 98 % -   09/23/17 0603 - - - - - 240 lb (108.9 kg)   09/23/17 0420 125/78 98.1 °F (36.7 °C) 75 18 95 % -   09/22/17 2332 140/85 98.3 °F (36.8 °C) 66 18 96 % -   09/22/17 2015 136/70 98 °F (36.7 °C) 65 17 98 % -       Labs:  Recent Labs      09/23/17   0423   WBC  5.9   HGB  9.8*   PLT  358   NA  141   K  4.0   CL  107   CO2  31   BUN  12   CREA  0.58*   GLU  99   TBILI  0.1*   SGOT  16   ALT  17   AP  74       Lab Results   Component Value Date/Time    WBC 5.9 09/23/2017 04:23 AM    HGB 9.8 09/23/2017 04:23 AM    PLATELET 197 16/34/6684 04:23 AM    Sodium 141 09/23/2017 04:23 AM    Potassium 4.0 09/23/2017 04:23 AM    Chloride 107 09/23/2017 04:23 AM    CO2 31 09/23/2017 04:23 AM    BUN 12 09/23/2017 04:23 AM    Creatinine 0.58 09/23/2017 04:23 AM Glucose 99 09/23/2017 04:23 AM    Bilirubin, total 0.1 09/23/2017 04:23 AM    AST (SGOT) 16 09/23/2017 04:23 AM    ALT (SGPT) 17 09/23/2017 04:23 AM    Alk. phosphatase 74 09/23/2017 04:23 AM       I reviewed recent labs and recent radiologic studies. I independently reviewed radiology images for studies I described above or studies I have ordered.    Admission date (for inpatients): 9/18/2017   * No surgery found *  * No surgery found *    ASSESSMENT/PLAN:  Problem List  Date Reviewed: 9/5/2017          Codes Class Noted    Massive Left Retroperitoneal fatty mass seen on CT scan extending from kidney to groin ICD-10-CM: R19.00  ICD-9-CM: 789.30  9/22/2017        Acute cystitis ICD-10-CM: N30.00  ICD-9-CM: 595.0  9/19/2017        RLQ abdominal mass ICD-10-CM: R19.03  ICD-9-CM: 789.33  9/19/2017        RLQ abdominal pain ICD-10-CM: R10.31  ICD-9-CM: 789.03  9/19/2017        Leukocytosis ICD-10-CM: D72.829  ICD-9-CM: 288.60  9/19/2017        Non-intractable vomiting with nausea ICD-10-CM: R11.2  ICD-9-CM: 787.01  9/19/2017        * (Principal)Intra-abdominal infection ICD-10-CM: B99.9  ICD-9-CM: 136.9  9/18/2017        Anxiety state, unspecified ICD-10-CM: F41.1  ICD-9-CM: 300.00  Unknown        GERD (gastroesophageal reflux disease) ICD-10-CM: K21.9  ICD-9-CM: 530.81  Unknown        Hypercholesterolemia ICD-10-CM: E78.00  ICD-9-CM: 272.0  Unknown            Principal Problem:    Intra-abdominal infection (9/18/2017)    Active Problems:    Acute cystitis (9/19/2017)      RLQ abdominal mass (9/19/2017)      RLQ abdominal pain (9/19/2017)      Leukocytosis (9/19/2017)      Non-intractable vomiting with nausea (9/19/2017)      Massive Left Retroperitoneal fatty mass seen on CT scan extending from kidney to groin (9/22/2017)       RLQ phlegmon involving bowel with RLQ pain, N/V and delayed presentation and 1 month prodrome  NPO/Bowel rest, TPN via PICC  IV Zosyn   Repeat CT on Monday is ordered-->She may need IR drainage on follow-up imaging if abscess develops (or possible perc biopsy)  Stool cultures to rule out infectious enteritis not listed in results section yet     Differential Dx  Ruptured (delayed presentation) appendictis with RLQ phelgmon  Malignancy (carcinoma, sarcoma, ovarian carcinoma)  Epiploicae Appendagitis  IBD  Diverticulitis  Ischemic Enteritis  Infectious Enteritis      In addition, there is also a left retroperitoneal infiltrative fatty mass-->rule out liposarcoma  Further workup for this on hold until we get resolution/stability of the RLQ inflammatory process  CEA and  were normal        Adult diaper for urinary incontinence - Pt couldn't make it to [facility] and wet bed  IV narcotic prn pain  IV Zofran/phenergan for nausea prn  GI (pepcid- add to TPN today) and VTE (lovenox) prophylaxis ordered          Signed:  PANCHO Jarquin-BC    The patient was seen in conjunction with Dr. Anette Zamudio who independently evaluated the patient, reviewed the chart and agreed with the assessment and plan. I have seen and examined the patient with the Nurse Practitioner and agree with the above assessment and plan. John Gilbert.  Anette Zamudio MD

## 2017-09-23 NOTE — PROGRESS NOTES
Problem: Nutrition Deficit  Goal: *Optimize nutritional status  Nutrition F/U: TPN Management- Reviewed, no change to TPN at this time.    Jose Anders, [address] [place] [address], [address]

## 2017-09-23 NOTE — PROGRESS NOTES
END OF SHIFT NOTE:    INTAKE/OUTPUT  09/22 0701 - 09/23 0700  In: 2549 [I.V.:2549]  Out: 2400 [Urine:2400]  Voiding: YES  Catheter: NO  Drain:              Flatus: Patient does not have flatus present. Stool:  0 occurrences. Characteristics:       Emesis: 0 occurrences. Characteristics:        VITAL SIGNS  Patient Vitals for the past 12 hrs:   Temp Pulse Resp BP SpO2   09/23/17 1500 97.8 °F (36.6 °C) 70 17 165/74 97 %   09/23/17 1100 98.1 °F (36.7 °C) 64 17 150/68 96 %   09/23/17 0650 97.6 °F (36.4 °C) 76 17 140/72 98 %       Pain Assessment  Pain Intensity 1: 0 (09/23/17 1500)  Pain Location 1: Abdomen  Pain Intervention(s) 1: Medication (see MAR)  Patient Stated Pain Goal: 0    Ambulating  Yes    Shift report given to oncoming nurse at the bedside.     Rd Felix RN

## 2017-09-23 NOTE — PROGRESS NOTES
END OF SHIFT NOTE:    INTAKE/OUTPUT  09/21 0701 - 09/22 0700  In: 1888 [I.V.:1888]  Out: 1600 [Urine:1600]  Voiding: YES  Catheter: NO  Drain:              Flatus: Patient does have flatus present. Stool:  1 occurrences. Characteristics:       Emesis: 0 occurrences. Characteristics:        VITAL SIGNS  Patient Vitals for the past 12 hrs:   Temp Pulse Resp BP SpO2   09/22/17 1515 98 °F (36.7 °C) 67 17 149/84 98 %   09/22/17 1045 98 °F (36.7 °C) 71 16 154/64 96 %       Pain Assessment  Pain Intensity 1: 0 (09/22/17 0750)  Pain Location 1: Abdomen  Pain Intervention(s) 1: Medication (see MAR)  Patient Stated Pain Goal: 0    Ambulating  Yes    Shift report given to oncoming nurse at the bedside.     Pete Diaz RN

## 2017-09-23 NOTE — PROGRESS NOTES
END OF SHIFT NOTE:    INTAKE/OUTPUT  09/22 0701 - 09/23 0700  In: 2174 [I.V.:2174]  Out: 2100 [Urine:2100]  Voiding: YES  Catheter: NO  Drain:              Flatus: Patient does have flatus present. Stool:  0 occurrences. Characteristics:       Emesis: 0 occurrences. Characteristics:        VITAL SIGNS  Patient Vitals for the past 12 hrs:   Temp Pulse Resp BP SpO2   09/23/17 0420 98.1 °F (36.7 °C) 75 18 125/78 95 %   09/22/17 2332 98.3 °F (36.8 °C) 66 18 140/85 96 %   09/22/17 2015 98 °F (36.7 °C) 65 17 136/70 98 %       Pain Assessment  Pain Intensity 1: 0 (09/22/17 2332)  Pain Location 1: Abdomen  Pain Intervention(s) 1: Medication (see MAR)  Patient Stated Pain Goal: 0    Ambulating  Yes    Shift report given to oncoming nurse at the bedside.     Kimber Steele RN

## 2017-09-24 LAB
ALBUMIN SERPL-MCNC: 2.7 G/DL (ref 3.2–4.6)
ALBUMIN/GLOB SERPL: 0.7 {RATIO} (ref 1.2–3.5)
ALP SERPL-CCNC: 82 U/L (ref 50–136)
ALT SERPL-CCNC: 29 U/L (ref 12–65)
ANION GAP SERPL CALC-SCNC: 8 MMOL/L (ref 7–16)
AST SERPL-CCNC: 29 U/L (ref 15–37)
BILIRUB SERPL-MCNC: 0.2 MG/DL (ref 0.2–1.1)
BUN SERPL-MCNC: 14 MG/DL (ref 8–23)
CALCIUM SERPL-MCNC: 8.8 MG/DL (ref 8.3–10.4)
CHLORIDE SERPL-SCNC: 106 MMOL/L (ref 98–107)
CO2 SERPL-SCNC: 26 MMOL/L (ref 21–32)
CREAT SERPL-MCNC: 0.56 MG/DL (ref 0.6–1)
ERYTHROCYTE [DISTWIDTH] IN BLOOD BY AUTOMATED COUNT: 16 % (ref 11.9–14.6)
GLOBULIN SER CALC-MCNC: 4.1 G/DL (ref 2.3–3.5)
GLUCOSE SERPL-MCNC: 107 MG/DL (ref 65–100)
HCT VFR BLD AUTO: 34.4 % (ref 35.8–46.3)
HGB BLD-MCNC: 10.6 G/DL (ref 11.7–15.4)
MCH RBC QN AUTO: 24.8 PG (ref 26.1–32.9)
MCHC RBC AUTO-ENTMCNC: 30.8 G/DL (ref 31.4–35)
MCV RBC AUTO: 80.6 FL (ref 79.6–97.8)
PLATELET # BLD AUTO: 361 K/UL (ref 150–450)
PMV BLD AUTO: 9.5 FL (ref 10.8–14.1)
POTASSIUM SERPL-SCNC: 4 MMOL/L (ref 3.5–5.1)
PROT SERPL-MCNC: 6.8 G/DL (ref 6.3–8.2)
RBC # BLD AUTO: 4.27 M/UL (ref 4.05–5.25)
SODIUM SERPL-SCNC: 140 MMOL/L (ref 136–145)
WBC # BLD AUTO: 5.8 K/UL (ref 4.3–11.1)

## 2017-09-24 PROCEDURE — 85027 COMPLETE CBC AUTOMATED: CPT | Performed by: SURGERY

## 2017-09-24 PROCEDURE — 65270000029 HC RM PRIVATE

## 2017-09-24 PROCEDURE — 36592 COLLECT BLOOD FROM PICC: CPT

## 2017-09-24 PROCEDURE — 74011000258 HC RX REV CODE- 258: Performed by: SURGERY

## 2017-09-24 PROCEDURE — 74011250637 HC RX REV CODE- 250/637: Performed by: SURGERY

## 2017-09-24 PROCEDURE — 74011250637 HC RX REV CODE- 250/637: Performed by: NURSE PRACTITIONER

## 2017-09-24 PROCEDURE — 74011250636 HC RX REV CODE- 250/636: Performed by: SURGERY

## 2017-09-24 PROCEDURE — 80053 COMPREHEN METABOLIC PANEL: CPT | Performed by: SURGERY

## 2017-09-24 PROCEDURE — 74011000250 HC RX REV CODE- 250: Performed by: SURGERY

## 2017-09-24 RX ADMIN — Medication 20 ML: at 21:51

## 2017-09-24 RX ADMIN — ENOXAPARIN SODIUM 40 MG: 40 INJECTION SUBCUTANEOUS at 09:13

## 2017-09-24 RX ADMIN — OXYCODONE HYDROCHLORIDE 10 MG: 5 TABLET ORAL at 21:47

## 2017-09-24 RX ADMIN — SOYBEAN OIL 250 ML: 20 INJECTION, SOLUTION INTRAVENOUS at 17:17

## 2017-09-24 RX ADMIN — PIPERACILLIN SODIUM,TAZOBACTAM SODIUM 3.38 G: 3; .375 INJECTION, POWDER, FOR SOLUTION INTRAVENOUS at 15:48

## 2017-09-24 RX ADMIN — SODIUM CHLORIDE 12.5 MG: 9 INJECTION INTRAMUSCULAR; INTRAVENOUS; SUBCUTANEOUS at 02:48

## 2017-09-24 RX ADMIN — POTASSIUM CHLORIDE: 2 INJECTION, SOLUTION, CONCENTRATE INTRAVENOUS at 17:17

## 2017-09-24 RX ADMIN — Medication 20 ML: at 05:05

## 2017-09-24 RX ADMIN — OXYCODONE HYDROCHLORIDE 5 MG: 5 TABLET ORAL at 17:28

## 2017-09-24 RX ADMIN — PIPERACILLIN SODIUM,TAZOBACTAM SODIUM 3.38 G: 3; .375 INJECTION, POWDER, FOR SOLUTION INTRAVENOUS at 22:15

## 2017-09-24 RX ADMIN — Medication 600 UNITS: at 21:51

## 2017-09-24 RX ADMIN — PIPERACILLIN SODIUM,TAZOBACTAM SODIUM 3.38 G: 3; .375 INJECTION, POWDER, FOR SOLUTION INTRAVENOUS at 06:07

## 2017-09-24 RX ADMIN — CITALOPRAM HYDROBROMIDE 10 MG: 20 TABLET ORAL at 09:13

## 2017-09-24 RX ADMIN — ACETAMINOPHEN 1000 MG: 500 TABLET, FILM COATED ORAL at 09:18

## 2017-09-24 RX ADMIN — Medication 600 UNITS: at 04:25

## 2017-09-24 RX ADMIN — SODIUM CHLORIDE 12.5 MG: 9 INJECTION INTRAMUSCULAR; INTRAVENOUS; SUBCUTANEOUS at 11:24

## 2017-09-24 RX ADMIN — ENOXAPARIN SODIUM 40 MG: 40 INJECTION SUBCUTANEOUS at 19:11

## 2017-09-24 NOTE — PROGRESS NOTES
END OF SHIFT NOTE:    INTAKE/OUTPUT  09/23 0701 - 09/24 0700  In: 1868 [I.V.:1868]  Out: 2700 [Urine:2700]  Voiding: YES  Catheter: NO  Drain:              Flatus: Patient does have flatus present. Stool:  0 occurrences. Characteristics:       Emesis: 0 occurrences. Characteristics:        VITAL SIGNS  Patient Vitals for the past 12 hrs:   Temp Pulse Resp BP SpO2   09/24/17 0250 98.5 °F (36.9 °C) 78 18 169/78 96 %   09/23/17 2305 98.3 °F (36.8 °C) 63 17 141/78 95 %       Pain Assessment  Pain Intensity 1: 0 (09/24/17 0005)  Pain Location 1: Abdomen  Pain Intervention(s) 1: Medication (see MAR)  Patient Stated Pain Goal: 0    Ambulating  Yes    Shift report given to oncoming nurse at the bedside.     Evita Bartholomew RN

## 2017-09-24 NOTE — PROGRESS NOTES
END OF SHIFT NOTE:    INTAKE/OUTPUT  09/23 0701 - 09/24 0700  In: 1868 [I.V.:1868]  Out: 2700 [Urine:2700]  Voiding: YES  Catheter: NO  Drain:              Flatus: Patient does have flatus present. Stool:  0 occurrences. Characteristics:       Emesis: 0 occurrences. Characteristics:        VITAL SIGNS  Patient Vitals for the past 12 hrs:   Temp Pulse Resp BP SpO2   09/24/17 1552 98.2 °F (36.8 °C) 62 16 113/52 96 %   09/24/17 1059 97.9 °F (36.6 °C) 67 18 132/71 96 %   09/24/17 0823 99.5 °F (37.5 °C) 69 18 140/75 97 %       Pain Assessment  Pain Intensity 1: 7 (09/24/17 1728)  Pain Location 1: Abdomen  Pain Intervention(s) 1: Medication (see MAR)  Patient Stated Pain Goal: 0    Ambulating  Yes    Shift report given to oncoming nurse at the bedside.     Leon Wellington RN

## 2017-09-24 NOTE — PROGRESS NOTES
Problem: Nutrition Deficit  Goal: *Optimize nutritional status  Nutrition F/U: TPN Management- Reviewed, no change to TPN at this time.    [facility], [address] [place] [address], [address] [place] [address]

## 2017-09-24 NOTE — PROGRESS NOTES
H&P/Consult Note/Progress Note/Office Note:   John Perez  MRN: [id number]  :1940  Age:76 y.o.    HPI: Cherlluviann Shear Y 36 y. o. female who reports a 1 month h/o RLQ pain.        This was progressively worsening and associated with intermittent N/V. It was moderate in intensity (8/10) leading up to admission. Nothing makes it better or worse. She was treated previously with laxatives without improvement. She came the [facility] ER about 5 days prior to admission and was treated with antibiotics for UTI.  No imaging was done. She came back to the ER on the day of admission and CT scan was obtained as shown below. She is s/p lap francois at [facility] in approx .    She is s/p hysterectomy about 40 yrs ago for benign fibroids. Derick Ariason thinks her ovaries remain. She denies associated bloody BMs or diarrhea.            Results for Madelaine Bobby (MRN [id number]) as of 2017 09:35     Ref. Range 2017 16:42   CEA Latest Ref Range: 0.0 - 3.0 ng/mL 0.8   CA-125 Latest Ref Range: 1.5 - 35.0 U/mL 5           17 CT abd/pelvis with contrast with IV but not oral contrast   Minimal linear atelectasis or scarring is present in the lung bases,  left greater than right. There is a small to moderate-sized hiatal hernia. No  pleural or pericardial fluid.      Small hypoattenuating lesions are present in the right hepatic lobe and left  kidney, too small to definitively characterize but statistically likely  representing cysts. There is central biliary ductal ectasia status post  cholecystectomy.      The remaining solid abdominal viscera are unremarkable to the limits of  noncontrast CT. Atherosclerotic abdominal aorta and branch vessels. No upper  abdominal no retroperitoneal lymph node enlargement. Nonopacified small bowel  loops are normal in caliber.      Pelvis:  There is somewhat prominent lipomatosis along the left posterior  pararenal fascia and iliopsoas without associated soft tissue component,  measuring up to 12.0 x 23 x 9 cm. The bladder is predominantly decompressed. Uterus not visualized.      Extensive diverticular changes are present in the colon which is otherwise  normal in caliber. There is a large ill-defined inflammatory process in the  right lower quadrant measuring 5.9 x 10.0 x 6.4 cm approximating the ileocecal  junction from which the appendix is not visualized as a distinctly separate  structure. Multiple high attenuation foci are present within or adjacent to the  bowel, suspicious for medication and/or enteroliths. There is moderate adjacent  inflammatory fat stranding and multiple prominent right lower quadrant  mesenteric lymph nodes, the largest of which measures 1.2 cm in short axis.      Trace dependent free pelvic fluid. No aggressive osseous lesions.      IMPRESSION:      1. Nonspecific inflammatory process in the right lower quadrant at the ileocecal  junction. Differential considerations include infectious, and ischemic colitis,  appendicitis, inflammatory bowel disease, omental infarct. Surgical consultation  recommended.      2. Large extraperitoneal predominantly fatty attenuation mass without definite  enhancing soft tissue. Size places the patient at risk for sarcoma, the inferior  extent of which is not included in the field of view.          9/19/17 Abd pain unchanged; Mass-effect in RLQ; PICC requested; on IV ABx; NPO  9/20/17 PICC placed yesterday; on TPN; Nausea persists;  RLQ pain no better since admission and still 8/10  Requests adult diapers secondary to urinary incontinence  9/21/17 says her pain is 8/10 but looks comfortable  9/22/17 pain issues improving  9/23/17 Awake in bed. No new complaints. 9/24/17 Resting in bed. No new complaints.  Family at bedside.  Arabella Mak  Past Medical History:   Diagnosis Date    Anxiety state, unspecified     GERD (gastroesophageal reflux disease)     Hypercholesterolemia      Past Surgical History:   Procedure Laterality Date    HX CHOLECYSTECTOMY      HX COLONOSCOPY      Dr Josefa Jansen 2012, repeat 5 years    HX GYN      hysterectomy, partial    HX KNEE REPLACEMENT      right     Current Facility-Administered Medications   Medication Dose Route Frequency    citalopram (CELEXA) tablet 10 mg  10 mg Oral DAILY    enoxaparin (LOVENOX) injection 40 mg  40 mg SubCUTAneous Q12H    TPN ADULT - dextrose 10% amino acid 4.25%   IntraVENous QPM    ondansetron (ZOFRAN) injection 4 mg  4 mg IntraVENous Q4H PRN    promethazine (PHENERGAN) with saline injection 12.5 mg  12.5 mg IntraVENous Q4H PRN    HYDROmorphone (PF) (DILAUDID) injection 0.2-0.3 mg  0.2-0.3 mg IntraVENous Q4H PRN    NUTRITIONAL SUPPORT ELECTROLYTE PRN ORDERS   Does Not Apply PRN    fat emulsion 20% (LIPOSYN, INTRALIPID) infusion 250 mL  250 mL IntraVENous QPM    sodium chloride (NS) flush 20 mL  20 mL InterCATHeter Q8H    heparin (porcine) pf 600 Units  600 Units InterCATHeter Q8H    sodium chloride (NS) flush 20 mL  20 mL InterCATHeter PRN    heparin (porcine) pf 600 Units  600 Units InterCATHeter PRN    acetaminophen (TYLENOL) tablet 1,000 mg  1,000 mg Oral Q6H PRN    oxyCODONE IR (ROXICODONE) tablet 5 mg  5 mg Oral Q4H PRN    Or    oxyCODONE IR (ROXICODONE) tablet 10 mg  10 mg Oral Q4H PRN    piperacillin-tazobactam (ZOSYN) 3.375 g in 0.9% sodium chloride (MBP/ADV) 100 mL  3.375 g IntraVENous Q8H     Morphine  Social History     Social History    Marital status:      Spouse name: N/A    Number of children: N/A    Years of education: N/A     Social History Main Topics    Smoking status: Former Smoker     Quit date: 1/1/1975    Smokeless tobacco: Never Used    Alcohol use No    Drug use: No    Sexual activity: Not on file     Other Topics Concern    Not on file     Social History Narrative     History   Smoking Status    Former Smoker    Quit date: 1/1/1975   Smokeless Tobacco    Never Used     Family History   Problem Relation Age of Onset    Diabetes Mother     Heart Disease Mother     Thyroid Disease Mother     Heart Disease Father      ROS:  Comprehensive review of systems was otherwise unremarkable except as noted above. Physical Exam:   Visit Vitals    /71    Pulse 67    Temp 97.9 °F (36.6 °C)    Resp 18    Ht 5' 4\" (1.626 m)    Wt 235 lb 9.6 oz (106.9 kg)    SpO2 96%    BMI 40.44 kg/m2     Constitutional: Alert, oriented, cooperative patient in no acute distress; appears stated age    Eyes:Sclera are clear. EOMs intact  ENMT: no external lesions gross hearing normal; no obvious neck masses, no ear or lip lesions, nares normal  CV: RRR. Normal perfusion  Resp: No JVD.  Breathing is  non-labored; no audible wheezing.    GI: obese; firmness/fullness/mass-effect in RLQ is unchanged since admission; tender to deeper palpation; no guarding    Musculoskeletal: unremarkable with normal function. No embolic signs or cyanosis.    Neuro:  Oriented; moves all 4; no focal deficits  Psychiatric: normal affect and mood, no memory impairment    Recent vitals (if inpt):  Patient Vitals for the past 24 hrs:   BP Temp Pulse Resp SpO2 Weight   09/24/17 1059 132/71 97.9 °F (36.6 °C) 67 18 96 % -   09/24/17 0823 140/75 99.5 °F (37.5 °C) 69 18 97 % -   09/24/17 0614 - - - - - 235 lb 9.6 oz (106.9 kg)   09/24/17 0250 169/78 98.5 °F (36.9 °C) 78 18 96 % -   09/23/17 2305 141/78 98.3 °F (36.8 °C) 63 17 95 % -   09/23/17 1745 161/78 98.2 °F (36.8 °C) 60 17 98 % -   09/23/17 1500 165/74 97.8 °F (36.6 °C) 70 17 97 % -       Labs:  Recent Labs      09/24/17   0430   WBC  5.8   HGB  10.6*   PLT  361   NA  140   K  4.0   CL  106   CO2  26   BUN  14   CREA  0.56*   GLU  107*   TBILI  0.2   SGOT  29   ALT  29   AP  82       Lab Results   Component Value Date/Time    WBC 5.8 09/24/2017 04:30 AM    HGB 10.6 09/24/2017 04:30 AM    PLATELET 349 55/04/1993 04:30 AM    Sodium 140 09/24/2017 04:30 AM    Potassium 4.0 09/24/2017 04:30 AM    Chloride 106 09/24/2017 04:30 AM    CO2 26 09/24/2017 04:30 AM    BUN 14 09/24/2017 04:30 AM    Creatinine 0.56 09/24/2017 04:30 AM    Glucose 107 09/24/2017 04:30 AM    Bilirubin, total 0.2 09/24/2017 04:30 AM    AST (SGOT) 29 09/24/2017 04:30 AM    ALT (SGPT) 29 09/24/2017 04:30 AM    Alk. phosphatase 82 09/24/2017 04:30 AM       I reviewed recent labs and recent radiologic studies. I independently reviewed radiology images for studies I described above or studies I have ordered.    Admission date (for inpatients): 9/18/2017   * No surgery found *  * No surgery found *    ASSESSMENT/PLAN:  Problem List  Date Reviewed: 9/5/2017          Codes Class Noted    Massive Left Retroperitoneal fatty mass seen on CT scan extending from kidney to groin ICD-10-CM: R19.00  ICD-9-CM: 789.30  9/22/2017        Acute cystitis ICD-10-CM: N30.00  ICD-9-CM: 595.0  9/19/2017        RLQ abdominal mass ICD-10-CM: R19.03  ICD-9-CM: 789.33  9/19/2017        RLQ abdominal pain ICD-10-CM: R10.31  ICD-9-CM: 789.03  9/19/2017        Leukocytosis ICD-10-CM: D72.829  ICD-9-CM: 288.60  9/19/2017        Non-intractable vomiting with nausea ICD-10-CM: R11.2  ICD-9-CM: 787.01  9/19/2017        * (Principal)Intra-abdominal infection ICD-10-CM: B99.9  ICD-9-CM: 136.9  9/18/2017        Anxiety state, unspecified ICD-10-CM: F41.1  ICD-9-CM: 300.00  Unknown        GERD (gastroesophageal reflux disease) ICD-10-CM: K21.9  ICD-9-CM: 530.81  Unknown        Hypercholesterolemia ICD-10-CM: E78.00  ICD-9-CM: 272.0  Unknown            Principal Problem:    Intra-abdominal infection (9/18/2017)    Active Problems:    Acute cystitis (9/19/2017)      RLQ abdominal mass (9/19/2017)      RLQ abdominal pain (9/19/2017)      Leukocytosis (9/19/2017)      Non-intractable vomiting with nausea (9/19/2017)      Massive Left Retroperitoneal fatty mass seen on CT scan extending from kidney to groin (9/22/2017)       RLQ phlegmon involving bowel with RLQ pain, N/V and delayed presentation and 1 month prodrome  NPO/Bowel rest, TPN via PICC  IV Zosyn   Repeat CT on Monday is ordered-->She may need IR drainage on follow-up imaging if abscess develops (or possible perc biopsy)  Stool cultures to rule out infectious enteritis not listed in results section yet      Differential Dx  Ruptured (delayed presentation) appendictis with RLQ phelgmon  Malignancy (carcinoma, sarcoma, ovarian carcinoma)  Epiploicae Appendagitis  IBD  Diverticulitis  Ischemic Enteritis  Infectious Enteritis      In addition, there is also a left retroperitoneal infiltrative fatty mass-->rule out liposarcoma  Further workup for this on hold until we get resolution/stability of the RLQ inflammatory process  CEA and  were normal          Adult diaper for urinary incontinence - Pt couldn't make it to [facility] and wet bed  IV narcotic prn pain  IV Zofran/phenergan for nausea prn  GI (pepcid- add to TPN) and VTE (lovenox) prophylaxis ordered          Signed: PANCHO Chamorro-BC    The patient was seen in conjunction with Dr. Johanna Pond who independently evaluated the patient, reviewed the chart and agreed with the assessment and plan. I have seen and examined the patient with the Nurse Practitioner and agree with the above assessment and plan. Nathanael Pond MD

## 2017-09-25 ENCOUNTER — APPOINTMENT (OUTPATIENT)
Dept: CT IMAGING | Age: 77
DRG: 330 | End: 2017-09-25
Attending: SURGERY
Payer: MEDICARE

## 2017-09-25 LAB
ALBUMIN SERPL-MCNC: 2.8 G/DL (ref 3.2–4.6)
ALBUMIN/GLOB SERPL: 0.7 {RATIO} (ref 1.2–3.5)
ALP SERPL-CCNC: 92 U/L (ref 50–136)
ALT SERPL-CCNC: 38 U/L (ref 12–65)
ANION GAP SERPL CALC-SCNC: 9 MMOL/L (ref 7–16)
AST SERPL-CCNC: 34 U/L (ref 15–37)
BILIRUB SERPL-MCNC: 0.2 MG/DL (ref 0.2–1.1)
BUN SERPL-MCNC: 19 MG/DL (ref 8–23)
CALCIUM SERPL-MCNC: 8.7 MG/DL (ref 8.3–10.4)
CHLORIDE SERPL-SCNC: 107 MMOL/L (ref 98–107)
CO2 SERPL-SCNC: 24 MMOL/L (ref 21–32)
CREAT SERPL-MCNC: 0.62 MG/DL (ref 0.6–1)
ERYTHROCYTE [DISTWIDTH] IN BLOOD BY AUTOMATED COUNT: 16.4 % (ref 11.9–14.6)
GLOBULIN SER CALC-MCNC: 4.1 G/DL (ref 2.3–3.5)
GLUCOSE SERPL-MCNC: 102 MG/DL (ref 65–100)
HCT VFR BLD AUTO: 35.9 % (ref 35.8–46.3)
HGB BLD-MCNC: 11 G/DL (ref 11.7–15.4)
MAGNESIUM SERPL-MCNC: 2 MG/DL (ref 1.8–2.4)
MCH RBC QN AUTO: 24.6 PG (ref 26.1–32.9)
MCHC RBC AUTO-ENTMCNC: 30.6 G/DL (ref 31.4–35)
MCV RBC AUTO: 80.3 FL (ref 79.6–97.8)
PHOSPHATE SERPL-MCNC: 3.4 MG/DL (ref 2.3–3.7)
PLATELET # BLD AUTO: 356 K/UL (ref 150–450)
PMV BLD AUTO: 9.8 FL (ref 10.8–14.1)
POTASSIUM SERPL-SCNC: 4.2 MMOL/L (ref 3.5–5.1)
PROT SERPL-MCNC: 6.9 G/DL (ref 6.3–8.2)
RBC # BLD AUTO: 4.47 M/UL (ref 4.05–5.25)
SODIUM SERPL-SCNC: 140 MMOL/L (ref 136–145)
WBC # BLD AUTO: 11.7 K/UL (ref 4.3–11.1)

## 2017-09-25 PROCEDURE — 74011250636 HC RX REV CODE- 250/636: Performed by: SURGERY

## 2017-09-25 PROCEDURE — 80053 COMPREHEN METABOLIC PANEL: CPT | Performed by: SURGERY

## 2017-09-25 PROCEDURE — 74011636320 HC RX REV CODE- 636/320: Performed by: SURGERY

## 2017-09-25 PROCEDURE — 74011250637 HC RX REV CODE- 250/637: Performed by: SURGERY

## 2017-09-25 PROCEDURE — 84100 ASSAY OF PHOSPHORUS: CPT | Performed by: SURGERY

## 2017-09-25 PROCEDURE — 74011250637 HC RX REV CODE- 250/637: Performed by: NURSE PRACTITIONER

## 2017-09-25 PROCEDURE — 74011000258 HC RX REV CODE- 258: Performed by: SURGERY

## 2017-09-25 PROCEDURE — 74011000250 HC RX REV CODE- 250: Performed by: SURGERY

## 2017-09-25 PROCEDURE — 85027 COMPLETE CBC AUTOMATED: CPT | Performed by: SURGERY

## 2017-09-25 PROCEDURE — 74177 CT ABD & PELVIS W/CONTRAST: CPT

## 2017-09-25 PROCEDURE — 36592 COLLECT BLOOD FROM PICC: CPT

## 2017-09-25 PROCEDURE — 65270000029 HC RM PRIVATE

## 2017-09-25 PROCEDURE — 87045 FECES CULTURE AEROBIC BACT: CPT | Performed by: SURGERY

## 2017-09-25 PROCEDURE — 83735 ASSAY OF MAGNESIUM: CPT | Performed by: SURGERY

## 2017-09-25 RX ORDER — SODIUM CHLORIDE 0.9 % (FLUSH) 0.9 %
10 SYRINGE (ML) INJECTION
Status: COMPLETED | OUTPATIENT
Start: 2017-09-25 | End: 2017-09-25

## 2017-09-25 RX ADMIN — ENOXAPARIN SODIUM 40 MG: 40 INJECTION SUBCUTANEOUS at 20:01

## 2017-09-25 RX ADMIN — SODIUM CHLORIDE 12.5 MG: 9 INJECTION INTRAMUSCULAR; INTRAVENOUS; SUBCUTANEOUS at 15:03

## 2017-09-25 RX ADMIN — PIPERACILLIN SODIUM,TAZOBACTAM SODIUM 3.38 G: 3; .375 INJECTION, POWDER, FOR SOLUTION INTRAVENOUS at 15:05

## 2017-09-25 RX ADMIN — SOYBEAN OIL 250 ML: 20 INJECTION, SOLUTION INTRAVENOUS at 17:58

## 2017-09-25 RX ADMIN — Medication 600 UNITS: at 04:43

## 2017-09-25 RX ADMIN — IOPAMIDOL 100 ML: 755 INJECTION, SOLUTION INTRAVENOUS at 08:40

## 2017-09-25 RX ADMIN — OXYCODONE HYDROCHLORIDE 10 MG: 5 TABLET ORAL at 06:08

## 2017-09-25 RX ADMIN — HYDROMORPHONE HYDROCHLORIDE 0.3 MG: 1 INJECTION, SOLUTION INTRAMUSCULAR; INTRAVENOUS; SUBCUTANEOUS at 22:04

## 2017-09-25 RX ADMIN — POTASSIUM CHLORIDE: 2 INJECTION, SOLUTION, CONCENTRATE INTRAVENOUS at 17:58

## 2017-09-25 RX ADMIN — Medication 10 ML: at 08:40

## 2017-09-25 RX ADMIN — PIPERACILLIN SODIUM,TAZOBACTAM SODIUM 3.38 G: 3; .375 INJECTION, POWDER, FOR SOLUTION INTRAVENOUS at 06:01

## 2017-09-25 RX ADMIN — SODIUM CHLORIDE 100 ML: 900 INJECTION, SOLUTION INTRAVENOUS at 08:40

## 2017-09-25 RX ADMIN — DIATRIZOATE MEGLUMINE AND DIATRIZOATE SODIUM 15 ML: 600; 100 SOLUTION ORAL; RECTAL at 06:27

## 2017-09-25 RX ADMIN — PIPERACILLIN SODIUM,TAZOBACTAM SODIUM 3.38 G: 3; .375 INJECTION, POWDER, FOR SOLUTION INTRAVENOUS at 22:07

## 2017-09-25 RX ADMIN — CITALOPRAM HYDROBROMIDE 10 MG: 20 TABLET ORAL at 09:35

## 2017-09-25 RX ADMIN — Medication 20 ML: at 22:09

## 2017-09-25 RX ADMIN — Medication 600 UNITS: at 22:10

## 2017-09-25 RX ADMIN — Medication 20 ML: at 05:17

## 2017-09-25 RX ADMIN — SODIUM CHLORIDE 12.5 MG: 9 INJECTION INTRAMUSCULAR; INTRAVENOUS; SUBCUTANEOUS at 09:35

## 2017-09-25 NOTE — PROGRESS NOTES
Problem: Nutrition Deficit  Goal: *Optimize nutritional status  Nutrition F/U:   TPN Management  The patient remains NPO and TPN dependent at this time. Patient continues with intense nausea. Labs are unremarkable at this time. Reviewed, no change to TPN at this time. Sierra Isaac Quirino 87, 66 [address],    [phone]

## 2017-09-25 NOTE — PROGRESS NOTES
END OF SHIFT NOTE:    INTAKE/OUTPUT  09/24 0701 - 09/25 0700  In: 3048 [I.V.:3048]  Out: 1700 [Urine:1700]  Voiding: YES  Catheter: NO  Drain:              Flatus: Patient does have flatus present. Stool:  0 occurrences. Characteristics:       Emesis: 0 occurrences. Characteristics:        VITAL SIGNS  Patient Vitals for the past 12 hrs:   Temp Pulse Resp BP SpO2   09/25/17 0349 98.7 °F (37.1 °C) 84 18 104/54 95 %   09/24/17 2352 98.2 °F (36.8 °C) 71 18 123/62 94 %   09/24/17 2030 98.4 °F (36.9 °C) 70 18 117/74 96 %       Pain Assessment  Pain Intensity 1: 8 (09/25/17 0608)  Pain Location 1: Abdomen  Pain Intervention(s) 1: Medication (see MAR)  Patient Stated Pain Goal: 0    Ambulating  Yes    Shift report given to oncoming nurse at the bedside.     Aislinn Devi RN

## 2017-09-26 LAB — GLUCOSE BLD STRIP.AUTO-MCNC: 135 MG/DL (ref 65–100)

## 2017-09-26 PROCEDURE — 74011250636 HC RX REV CODE- 250/636: Performed by: SURGERY

## 2017-09-26 PROCEDURE — 74011250637 HC RX REV CODE- 250/637: Performed by: NURSE PRACTITIONER

## 2017-09-26 PROCEDURE — 65270000029 HC RM PRIVATE

## 2017-09-26 PROCEDURE — 82962 GLUCOSE BLOOD TEST: CPT

## 2017-09-26 PROCEDURE — 74011000258 HC RX REV CODE- 258: Performed by: SURGERY

## 2017-09-26 PROCEDURE — 74011250637 HC RX REV CODE- 250/637: Performed by: SURGERY

## 2017-09-26 PROCEDURE — 74011000250 HC RX REV CODE- 250: Performed by: SURGERY

## 2017-09-26 RX ORDER — ONDANSETRON 4 MG/1
4 TABLET, ORALLY DISINTEGRATING ORAL
Status: DISCONTINUED | OUTPATIENT
Start: 2017-09-26 | End: 2017-10-03

## 2017-09-26 RX ADMIN — OXYCODONE HYDROCHLORIDE 10 MG: 5 TABLET ORAL at 22:57

## 2017-09-26 RX ADMIN — Medication 20 ML: at 05:56

## 2017-09-26 RX ADMIN — SODIUM CHLORIDE 12.5 MG: 9 INJECTION INTRAMUSCULAR; INTRAVENOUS; SUBCUTANEOUS at 11:57

## 2017-09-26 RX ADMIN — SOYBEAN OIL 250 ML: 20 INJECTION, SOLUTION INTRAVENOUS at 18:08

## 2017-09-26 RX ADMIN — Medication 600 UNITS: at 05:56

## 2017-09-26 RX ADMIN — Medication 20 ML: at 14:48

## 2017-09-26 RX ADMIN — WATER 4 MG: 1 INJECTION INTRAMUSCULAR; INTRAVENOUS; SUBCUTANEOUS at 16:52

## 2017-09-26 RX ADMIN — PIPERACILLIN SODIUM,TAZOBACTAM SODIUM 3.38 G: 3; .375 INJECTION, POWDER, FOR SOLUTION INTRAVENOUS at 22:58

## 2017-09-26 RX ADMIN — ENOXAPARIN SODIUM 40 MG: 40 INJECTION SUBCUTANEOUS at 08:40

## 2017-09-26 RX ADMIN — SODIUM CHLORIDE 12.5 MG: 9 INJECTION INTRAMUSCULAR; INTRAVENOUS; SUBCUTANEOUS at 05:54

## 2017-09-26 RX ADMIN — ENOXAPARIN SODIUM 40 MG: 40 INJECTION SUBCUTANEOUS at 20:01

## 2017-09-26 RX ADMIN — HYDROMORPHONE HYDROCHLORIDE 0.3 MG: 1 INJECTION, SOLUTION INTRAMUSCULAR; INTRAVENOUS; SUBCUTANEOUS at 20:10

## 2017-09-26 RX ADMIN — CITALOPRAM HYDROBROMIDE 10 MG: 20 TABLET ORAL at 08:41

## 2017-09-26 RX ADMIN — PIPERACILLIN SODIUM,TAZOBACTAM SODIUM 3.38 G: 3; .375 INJECTION, POWDER, FOR SOLUTION INTRAVENOUS at 06:00

## 2017-09-26 RX ADMIN — Medication 20 ML: at 23:03

## 2017-09-26 RX ADMIN — ONDANSETRON 4 MG: 4 TABLET, ORALLY DISINTEGRATING ORAL at 14:46

## 2017-09-26 RX ADMIN — Medication 600 UNITS: at 14:00

## 2017-09-26 RX ADMIN — POTASSIUM CHLORIDE: 2 INJECTION, SOLUTION, CONCENTRATE INTRAVENOUS at 18:08

## 2017-09-26 RX ADMIN — OXYCODONE HYDROCHLORIDE 10 MG: 5 TABLET ORAL at 15:39

## 2017-09-26 RX ADMIN — PIPERACILLIN SODIUM,TAZOBACTAM SODIUM 3.38 G: 3; .375 INJECTION, POWDER, FOR SOLUTION INTRAVENOUS at 14:46

## 2017-09-26 NOTE — PROGRESS NOTES
END OF SHIFT NOTE:    INTAKE/OUTPUT  09/25 0701 - 09/26 0700  In: 2334 [I.V.:2334]  Out: 150 [Urine:150]  Voiding: YES, several urine unmmeasure occurences  Catheter: NO  Drain:              Flatus: Patient does have flatus present. Stool:  0 occurrences. Characteristics:  Stool Assessment  Stool Color: Brown  Stool Appearance: Soft  Stool Amount: Small  Stool Source/Status: Rectum    Emesis: 0 occurrences. Characteristics:        VITAL SIGNS  Patient Vitals for the past 12 hrs:   Temp Pulse Resp BP SpO2   09/26/17 1539 97.8 °F (36.6 °C) 71 18 130/69 96 %   09/26/17 1533 97.8 °F (36.6 °C) 71 18 130/69 96 %   09/26/17 1051 98.4 °F (36.9 °C) (!) 55 18 111/58 97 %   09/26/17 0705 98.1 °F (36.7 °C) 72 18 105/53 96 %       Pain Assessment  Pain Intensity 1: 4 (09/26/17 1714)  Pain Location 1: Abdomen  Pain Intervention(s) 1: Medication (see MAR)  Patient Stated Pain Goal: 0    Ambulating  Yes    Shift report given to oncoming nurse at the bedside.     Tara Black RN

## 2017-09-26 NOTE — PROGRESS NOTES
H&P/Consult Note/Progress Note/Office Note:   Wandy Mcmanus  MRN: [id number]  :1940  Age:76 y.o.    HPI: Santos Ramos I 85 y. o. female who reports a 1 month h/o RLQ pain.        This was progressively worsening and associated with intermittent N/V. It was moderate in intensity (8/10) leading up to admission. Nothing makes it better or worse. She was treated previously with laxatives without improvement. She came the [facility] ER about 5 days prior to admission and was treated with antibiotics for UTI.  No imaging was done. She came back to the ER on the day of admission and CT scan was obtained as shown below. She is s/p lap francois at [facility] in approx .    She is s/p hysterectomy about 40 yrs ago for benign fibroids. Keyla Thomas thinks her ovaries remain. She denies associated bloody BMs or diarrhea.            Results for Jerri Eaton (MRN [id number]) as of 2017 09:35     Ref. Range 2017 16:42   CEA Latest Ref Range: 0.0 - 3.0 ng/mL 0.8   CA-125 Latest Ref Range: 1.5 - 35.0 U/mL 5           17 CT abd/pelvis with contrast with IV but not oral contrast   Minimal linear atelectasis or scarring is present in the lung bases, left greater than right. There is a small to moderate-sized hiatal hernia. No pleural or pericardial fluid.      Small hypoattenuating lesions are present in the right hepatic lobe and left kidney, too small to definitively characterize but statistically likely  representing cysts. There is central biliary ductal ectasia s/p cholecystectomy.      The remaining solid abdominal viscera are unremarkable to the limits of  noncontrast CT. Atherosclerotic abdominal aorta and branch vessels. No upper  abdominal no retroperitoneal lymph node enlargement. Nonopacified small bowel  loops are normal in caliber.      Pelvis:  There is somewhat prominent lipomatosis along the left posterior  pararenal fascia and iliopsoas without associated soft tissue component,  measuring up to 12.0 x 23 x 9 cm. The bladder is predominantly decompressed. Uterus not visualized.      Extensive diverticular changes are present in the colon which is otherwise  normal in caliber. There is a large ill-defined inflammatory process in the  right lower quadrant measuring 5.9 x 10.0 x 6.4 cm approximating the ileocecal  junction from which the appendix is not visualized as a distinctly separate  structure. Multiple high attenuation foci are present within or adjacent to the  bowel, suspicious for medication and/or enteroliths. There is moderate adjacent  inflammatory fat stranding and multiple prominent right lower quadrant  mesenteric lymph nodes, the largest of which measures 1.2 cm in short axis.      Trace dependent free pelvic fluid. No aggressive osseous lesions.      IMPRESSION:  Nonspecific inflammatory process in RLQ at the ileocecal junction. Diff Dx include infectious, and ischemic colitis, appendicitis, inflammatory bowel disease, omental infarct.       Large extraperitoneal predominantly fatty attenuation mass without definite enhancing soft tissue. Size places the patient at risk for sarcoma, the inferior extent of which is not included in the field of view.        9/25/17 CT abd/pelvis with oral and IV contrast   Tiny hypodense lesion near the dome of the liver and another seen within the right posterior lobe. These are too small to further characterize. Otherwise, the liver and spleen enhances homogeneously without discrete lesions. There is no biliary ductal dilatation. Clips are present from prior cholecystectomy. The pancreas and adrenal glands are normal. The kidneys  enhance symmetrically. Bowel loops in the upper abdomen are normal. No definite upper abdominal lymphadenopathy seen.     The RLQ phlegmon seen previously is again noted with significant bowel wall thickening involving the cecum and distal ileum. There is adjacent fat stranding.  The large extraperitoneal fatty mass discussed previously is again noted and is unchanged. The bladder and rectum are normal.  No pelvic adenopathy seen. No free air or free fluid seen within the pelvis.       IMPRESSION: No significant interval change when compared with the prior study.          9/19/17 Abd pain unchanged; Mass-effect in RLQ; PICC requested; on IV ABx; NPO  9/20/17 PICC placed yesterday; on TPN; Nausea persists;  RLQ pain no better since admission and still 8/10  Requests adult diapers secondary to urinary incontinence  9/21/17 says her pain is 8/10 but looks comfortable  9/22/17 pain issues improving  9/23/17 Awake in bed. No new complaints. 9/24/17 Resting in bed. No new complaints. Family at bedside.   9/25/17 Overall improvement in RLQ pain compared to admission  9/26/17 reports RLQ abd pain better since admission          Past Medical History:   Diagnosis Date    Anxiety state, unspecified     GERD (gastroesophageal reflux disease)     Hypercholesterolemia      Past Surgical History:   Procedure Laterality Date    HX CHOLECYSTECTOMY      HX COLONOSCOPY      Dr Natalie Gonzalez 2012, repeat 5 years    HX GYN      hysterectomy, partial    HX KNEE REPLACEMENT      right     Current Facility-Administered Medications   Medication Dose Route Frequency    ondansetron (ZOFRAN ODT) tablet 4 mg  4 mg Oral Q6H PRN    citalopram (CELEXA) tablet 10 mg  10 mg Oral DAILY    enoxaparin (LOVENOX) injection 40 mg  40 mg SubCUTAneous Q12H    TPN ADULT - dextrose 10% amino acid 4.25%   IntraVENous QPM    ondansetron (ZOFRAN) injection 4 mg  4 mg IntraVENous Q4H PRN    promethazine (PHENERGAN) with saline injection 12.5 mg  12.5 mg IntraVENous Q4H PRN    HYDROmorphone (PF) (DILAUDID) injection 0.2-0.3 mg  0.2-0.3 mg IntraVENous Q4H PRN    NUTRITIONAL SUPPORT ELECTROLYTE PRN ORDERS   Does Not Apply PRN    fat emulsion 20% (LIPOSYN, INTRALIPID) infusion 250 mL  250 mL IntraVENous QPM    sodium chloride (NS) flush 20 mL  20 mL InterCATHeter Q8H    heparin (porcine) pf 600 Units  600 Units InterCATHeter Q8H    sodium chloride (NS) flush 20 mL  20 mL InterCATHeter PRN    heparin (porcine) pf 600 Units  600 Units InterCATHeter PRN    acetaminophen (TYLENOL) tablet 1,000 mg  1,000 mg Oral Q6H PRN    oxyCODONE IR (ROXICODONE) tablet 5 mg  5 mg Oral Q4H PRN    Or    oxyCODONE IR (ROXICODONE) tablet 10 mg  10 mg Oral Q4H PRN    piperacillin-tazobactam (ZOSYN) 3.375 g in 0.9% sodium chloride (MBP/ADV) 100 mL  3.375 g IntraVENous Q8H     Morphine  Social History     Social History    Marital status:      Spouse name: N/A    Number of children: N/A    Years of education: N/A     Social History Main Topics    Smoking status: Former Smoker     Quit date: 1/1/1975    Smokeless tobacco: Never Used    Alcohol use No    Drug use: No    Sexual activity: Not on file     Other Topics Concern    Not on file     Social History Narrative     History   Smoking Status    Former Smoker    Quit date: 1/1/1975   Smokeless Tobacco    Never Used     Family History   Problem Relation Age of Onset    Diabetes Mother     Heart Disease Mother     Thyroid Disease Mother     Heart Disease Father      ROS:  Comprehensive review of systems was otherwise unremarkable except as noted above. Physical Exam:   Visit Vitals    /69    Pulse 71    Temp 97.8 °F (36.6 °C)    Resp 18    Ht 5' 4\" (1.626 m)    Wt 104.5 kg (230 lb 4.8 oz)    SpO2 96%    BMI 39.53 kg/m2     Constitutional: Alert, oriented, cooperative patient in no acute distress; appears stated age    Eyes:Sclera are clear. EOMs intact  ENMT: no external lesions gross hearing normal; no obvious neck masses, no ear or lip lesions, nares normal  CV: RRR.  Normal perfusion  Resp: No JVD.  Breathing is  non-labored; no audible wheezing.    GI: obese; firmness/fullness/mass-effect in RLQ is softer than at admission; tender to deeper palpation; no guarding    Musculoskeletal: unremarkable with normal function. No embolic signs or cyanosis. Neuro:  Oriented; moves all 4; no focal deficits  Psychiatric: normal affect and mood, no memory impairment    Recent vitals (if inpt):  Patient Vitals for the past 24 hrs:   BP Temp Pulse Resp SpO2 Weight   09/26/17 1539 130/69 97.8 °F (36.6 °C) 71 18 96 % -   09/26/17 1533 130/69 97.8 °F (36.6 °C) 71 18 96 % -   09/26/17 1051 111/58 98.4 °F (36.9 °C) (!) 55 18 97 % -   09/26/17 0705 105/53 98.1 °F (36.7 °C) 72 18 96 % -   09/26/17 0300 136/77 98.3 °F (36.8 °C) (!) 108 18 96 % -   09/26/17 0017 - - - - - 104.5 kg (230 lb 4.8 oz)   09/25/17 2300 128/59 98.9 °F (37.2 °C) 72 17 95 % -   09/25/17 1900 131/65 98.9 °F (37.2 °C) 73 17 96 % -       Labs:  Recent Labs      09/25/17   0443   WBC  11.7*   HGB  11.0*   PLT  356   NA  140   K  4.2   CL  107   CO2  24   BUN  19   CREA  0.62   GLU  102*   TBILI  0.2   SGOT  34   ALT  38   AP  92       Lab Results   Component Value Date/Time    WBC 11.7 09/25/2017 04:43 AM    HGB 11.0 09/25/2017 04:43 AM    PLATELET 938 04/39/8016 04:43 AM    Sodium 140 09/25/2017 04:43 AM    Potassium 4.2 09/25/2017 04:43 AM    Chloride 107 09/25/2017 04:43 AM    CO2 24 09/25/2017 04:43 AM    BUN 19 09/25/2017 04:43 AM    Creatinine 0.62 09/25/2017 04:43 AM    Glucose 102 09/25/2017 04:43 AM    Bilirubin, total 0.2 09/25/2017 04:43 AM    AST (SGOT) 34 09/25/2017 04:43 AM    ALT (SGPT) 38 09/25/2017 04:43 AM    Alk. phosphatase 92 09/25/2017 04:43 AM       I reviewed recent labs and recent radiologic studies. I independently reviewed radiology images for studies I described above or studies I have ordered.    Admission date (for inpatients): 9/18/2017   * No surgery found *  * No surgery found *    ASSESSMENT/PLAN:  Problem List  Date Reviewed: 9/5/2017          Codes Class Noted    Massive Left Retroperitoneal fatty mass seen on CT scan extending from kidney to groin ICD-10-CM: R19.00  ICD-9-CM: 789.30  9/22/2017        Acute cystitis ICD-10-CM: N30.00  ICD-9-CM: 595.0  9/19/2017        RLQ abdominal mass ICD-10-CM: R19.03  ICD-9-CM: 789.33  9/19/2017        RLQ abdominal pain ICD-10-CM: R10.31  ICD-9-CM: 789.03  9/19/2017        Leukocytosis ICD-10-CM: M32.846  ICD-9-CM: 288.60  9/19/2017        Non-intractable vomiting with nausea ICD-10-CM: R11.2  ICD-9-CM: 787.01  9/19/2017        * (Principal)Intra-abdominal infection ICD-10-CM: B99.9  ICD-9-CM: 136.9  9/18/2017        Anxiety state, unspecified ICD-10-CM: F41.1  ICD-9-CM: 300.00  Unknown        GERD (gastroesophageal reflux disease) ICD-10-CM: K21.9  ICD-9-CM: 530.81  Unknown        Hypercholesterolemia ICD-10-CM: E78.00  ICD-9-CM: 272.0  Unknown            Principal Problem:    Intra-abdominal infection (9/18/2017)    Active Problems:    Acute cystitis (9/19/2017)      RLQ abdominal mass (9/19/2017)      RLQ abdominal pain (9/19/2017)      Leukocytosis (9/19/2017)      Non-intractable vomiting with nausea (9/19/2017)      Massive Left Retroperitoneal fatty mass seen on CT scan extending from kidney to groin (9/22/2017)       RLQ phlegmon involving bowel with RLQ pain, N/V and delayed presentation and 1 month prodrome  NPO/Bowel rest, TPN via PICC  IV Zosyn   Repeat CT Monday  OR Tuesday if no better        Differential Dx  Ruptured (delayed presentation) appendictis with RLQ phelgmon  Malignancy (carcinoma, sarcoma, ovarian carcinoma)  Epiploicae Appendagitis  IBD  Diverticulitis  Ischemic Enteritis  Infectious Enteritis      In addition, there is also a left retroperitoneal infiltrative fatty mass-->rule out liposarcoma  Further workup for this on hold until we get resolution/stability of the RLQ inflammatory process  CEA and  were normal          Adult diaper for urinary incontinence - Pt couldn't make it to [facility] and wet bed  IV narcotic prn pain  IV Zofran/phenergan for nausea prn  GI (pepcid- add to TPN) and VTE (lovenox) prophylaxis ordered

## 2017-09-26 NOTE — PROGRESS NOTES
Problem: Nutrition Deficit  Goal: *Optimize nutritional status  Nutrition F/U: TPN Management- No labs today. Has active order for Phos and Mg MWD. Will add BMP MWF. No change to TPN at this time.    Lulu Barron, [address] [place] [address], [address] [place] [address], [address] [place] [address]

## 2017-09-26 NOTE — PROGRESS NOTES
END OF SHIFT NOTE:    INTAKE/OUTPUT  09/25 0701 - 09/26 0700  In: 2334 [I.V.:2334]  Out: 150 [Urine:150]  Voiding: YES  Catheter: NO  Drain:              Flatus: Patient does have flatus present. Stool:  1 occurrences. Characteristics:  Stool Assessment  Stool Color: Brown  Stool Appearance: Soft  Stool Amount: Small  Stool Source/Status: Rectum    Emesis: 0 occurrences. Characteristics:        VITAL SIGNS  Patient Vitals for the past 12 hrs:   Temp Pulse Resp BP SpO2   09/26/17 0300 98.3 °F (36.8 °C) (!) 108 18 136/77 96 %   09/25/17 2300 98.9 °F (37.2 °C) 72 17 128/59 95 %   09/25/17 1900 98.9 °F (37.2 °C) 73 17 131/65 96 %       Pain Assessment  Pain Intensity 1: 0 (09/25/17 2312)  Pain Location 1: Abdomen  Pain Intervention(s) 1: Medication (see MAR)  Patient Stated Pain Goal: 0    Ambulating  Yes    Shift report given to oncoming nurse at the bedside.     Elenita Barton, RN

## 2017-09-27 LAB
ANION GAP SERPL CALC-SCNC: 7 MMOL/L (ref 7–16)
BUN SERPL-MCNC: 17 MG/DL (ref 8–23)
CALCIUM SERPL-MCNC: 8.5 MG/DL (ref 8.3–10.4)
CHLORIDE SERPL-SCNC: 109 MMOL/L (ref 98–107)
CO2 SERPL-SCNC: 25 MMOL/L (ref 21–32)
CREAT SERPL-MCNC: 0.59 MG/DL (ref 0.6–1)
GLUCOSE SERPL-MCNC: 100 MG/DL (ref 65–100)
MAGNESIUM SERPL-MCNC: 2 MG/DL (ref 1.8–2.4)
PHOSPHATE SERPL-MCNC: 3.3 MG/DL (ref 2.3–3.7)
POTASSIUM SERPL-SCNC: 4.5 MMOL/L (ref 3.5–5.1)
SODIUM SERPL-SCNC: 141 MMOL/L (ref 136–145)

## 2017-09-27 PROCEDURE — 84100 ASSAY OF PHOSPHORUS: CPT | Performed by: SURGERY

## 2017-09-27 PROCEDURE — 65270000029 HC RM PRIVATE

## 2017-09-27 PROCEDURE — 74011250637 HC RX REV CODE- 250/637: Performed by: SURGERY

## 2017-09-27 PROCEDURE — 74011000250 HC RX REV CODE- 250: Performed by: SURGERY

## 2017-09-27 PROCEDURE — 80048 BASIC METABOLIC PNL TOTAL CA: CPT | Performed by: SURGERY

## 2017-09-27 PROCEDURE — 74011000258 HC RX REV CODE- 258: Performed by: SURGERY

## 2017-09-27 PROCEDURE — 74011250637 HC RX REV CODE- 250/637: Performed by: NURSE PRACTITIONER

## 2017-09-27 PROCEDURE — 83735 ASSAY OF MAGNESIUM: CPT | Performed by: SURGERY

## 2017-09-27 PROCEDURE — 74011250636 HC RX REV CODE- 250/636: Performed by: SURGERY

## 2017-09-27 RX ADMIN — ENOXAPARIN SODIUM 40 MG: 40 INJECTION SUBCUTANEOUS at 09:12

## 2017-09-27 RX ADMIN — Medication 20 ML: at 05:17

## 2017-09-27 RX ADMIN — Medication 600 UNITS: at 05:17

## 2017-09-27 RX ADMIN — Medication 20 ML: at 21:42

## 2017-09-27 RX ADMIN — SODIUM CHLORIDE 12.5 MG: 9 INJECTION INTRAMUSCULAR; INTRAVENOUS; SUBCUTANEOUS at 11:43

## 2017-09-27 RX ADMIN — POTASSIUM CHLORIDE: 2 INJECTION, SOLUTION, CONCENTRATE INTRAVENOUS at 18:11

## 2017-09-27 RX ADMIN — SODIUM CHLORIDE 12.5 MG: 9 INJECTION INTRAMUSCULAR; INTRAVENOUS; SUBCUTANEOUS at 00:49

## 2017-09-27 RX ADMIN — Medication 20 ML: at 16:07

## 2017-09-27 RX ADMIN — ONDANSETRON 4 MG: 2 INJECTION INTRAMUSCULAR; INTRAVENOUS at 21:41

## 2017-09-27 RX ADMIN — Medication 600 UNITS: at 16:06

## 2017-09-27 RX ADMIN — CITALOPRAM HYDROBROMIDE 10 MG: 20 TABLET ORAL at 09:12

## 2017-09-27 RX ADMIN — PIPERACILLIN SODIUM,TAZOBACTAM SODIUM 3.38 G: 3; .375 INJECTION, POWDER, FOR SOLUTION INTRAVENOUS at 22:29

## 2017-09-27 RX ADMIN — ENOXAPARIN SODIUM 40 MG: 40 INJECTION SUBCUTANEOUS at 19:54

## 2017-09-27 RX ADMIN — ACETAMINOPHEN 1000 MG: 500 TABLET, FILM COATED ORAL at 09:20

## 2017-09-27 RX ADMIN — PIPERACILLIN SODIUM,TAZOBACTAM SODIUM 3.38 G: 3; .375 INJECTION, POWDER, FOR SOLUTION INTRAVENOUS at 16:06

## 2017-09-27 RX ADMIN — PIPERACILLIN SODIUM,TAZOBACTAM SODIUM 3.38 G: 3; .375 INJECTION, POWDER, FOR SOLUTION INTRAVENOUS at 06:27

## 2017-09-27 RX ADMIN — SOYBEAN OIL 250 ML: 20 INJECTION, SOLUTION INTRAVENOUS at 18:11

## 2017-09-27 NOTE — PROGRESS NOTES
Problem: Nutrition Deficit  Goal: *Optimize nutritional status  Nutrition F/U: TPN Management- Reviewed, no change to TPN at this time.    Elizabet Kulkarni, [address] [place] [address], [facility] [address]

## 2017-09-27 NOTE — PROGRESS NOTES
H&P/Consult Note/Progress Note/Office Note:   James Sellers  MRN: [id number]  :1940  Age:76 y.o.    HPI: Verdell Mcburney S 7 y. o. female who reports a 1 month h/o RLQ pain.        This was progressively worsening and associated with intermittent N/V. It was moderate in intensity (8/10) leading up to admission. Nothing makes it better or worse. She was treated previously with laxatives without improvement. She came the [facility] ER about 5 days prior to admission and was treated with antibiotics for UTI.  No imaging was done. She came back to the ER on the day of admission and CT scan was obtained as shown below. She is s/p lap francois at [facility] in approx .    She is s/p hysterectomy about 40 yrs ago for benign fibroids. Rojeliokelly [place] thinks her ovaries remain. She denies associated bloody BMs or diarrhea.            Results for [facility] (MRN [id number]) as of 2017 09:35     Ref. Range 2017 16:42   CEA Latest Ref Range: 0.0 - 3.0 ng/mL 0.8   CA-125 Latest Ref Range: 1.5 - 35.0 U/mL 5           17 CT abd/pelvis with contrast with IV but not oral contrast   Minimal linear atelectasis or scarring is present in the lung bases, left greater than right. There is a small to moderate-sized hiatal hernia. No pleural or pericardial fluid.      Small hypoattenuating lesions are present in the right hepatic lobe and left kidney, too small to definitively characterize but statistically likely  representing cysts. There is central biliary ductal ectasia s/p cholecystectomy.      The remaining solid abdominal viscera are unremarkable to the limits of  noncontrast CT. Atherosclerotic abdominal aorta and branch vessels. No upper  abdominal no retroperitoneal lymph node enlargement. Nonopacified small bowel  loops are normal in caliber.      Pelvis:  There is somewhat prominent lipomatosis along the left posterior  pararenal fascia and iliopsoas without associated soft tissue component,  measuring up to 12.0 x 23 x 9 cm. The bladder is predominantly decompressed. Uterus not visualized.      Extensive diverticular changes are present in the colon which is otherwise  normal in caliber. There is a large ill-defined inflammatory process in the  right lower quadrant measuring 5.9 x 10.0 x 6.4 cm approximating the ileocecal  junction from which the appendix is not visualized as a distinctly separate  structure. Multiple high attenuation foci are present within or adjacent to the  bowel, suspicious for medication and/or enteroliths. There is moderate adjacent  inflammatory fat stranding and multiple prominent right lower quadrant  mesenteric lymph nodes, the largest of which measures 1.2 cm in short axis.      Trace dependent free pelvic fluid. No aggressive osseous lesions.      IMPRESSION:  Nonspecific inflammatory process in RLQ at the ileocecal junction. Diff Dx include infectious, and ischemic colitis, appendicitis, inflammatory bowel disease, omental infarct.       Large extraperitoneal predominantly fatty attenuation mass without definite enhancing soft tissue. Size places the patient at risk for sarcoma, the inferior extent of which is not included in the field of view.        9/25/17 CT abd/pelvis with oral and IV contrast   Tiny hypodense lesion near the dome of the liver and another seen within the right posterior lobe. These are too small to further characterize. Otherwise, the liver and spleen enhances homogeneously without discrete lesions. There is no biliary ductal dilatation. Clips are present from prior cholecystectomy. The pancreas and adrenal glands are normal. The kidneys  enhance symmetrically. Bowel loops in the upper abdomen are normal. No definite upper abdominal lymphadenopathy seen.     The RLQ phlegmon seen previously is again noted with significant bowel wall thickening involving the cecum and distal ileum. There is adjacent fat stranding.  The large extraperitoneal fatty mass discussed previously is again noted and is unchanged. The bladder and rectum are normal.  No pelvic adenopathy seen. No free air or free fluid seen within the pelvis.       IMPRESSION: No significant interval change when compared with the prior study.          9/19/17 Abd pain unchanged; Mass-effect in RLQ; PICC requested; on IV ABx; NPO  9/20/17 PICC placed yesterday; on TPN; Nausea persists;  RLQ pain no better since admission and still 8/10  Requests adult diapers secondary to urinary incontinence  9/21/17 says her pain is 8/10 but looks comfortable  9/22/17 pain issues improving  9/23/17 Awake in bed. No new complaints. 9/24/17 Resting in bed. No new complaints. Family at bedside.   9/25/17 Overall improvement in RLQ pain compared to admission  9/26/17 reports RLQ abd pain better since admission  9/27/17 no changes          Past Medical History:   Diagnosis Date    Anxiety state, unspecified     GERD (gastroesophageal reflux disease)     Hypercholesterolemia      Past Surgical History:   Procedure Laterality Date    HX CHOLECYSTECTOMY      HX COLONOSCOPY      Dr Cuco Oneill 2012, repeat 5 years    HX GYN      hysterectomy, partial    HX KNEE REPLACEMENT      right     Current Facility-Administered Medications   Medication Dose Route Frequency    ondansetron (ZOFRAN ODT) tablet 4 mg  4 mg Oral Q6H PRN    citalopram (CELEXA) tablet 10 mg  10 mg Oral DAILY    enoxaparin (LOVENOX) injection 40 mg  40 mg SubCUTAneous Q12H    TPN ADULT - dextrose 10% amino acid 4.25%   IntraVENous QPM    ondansetron (ZOFRAN) injection 4 mg  4 mg IntraVENous Q4H PRN    promethazine (PHENERGAN) with saline injection 12.5 mg  12.5 mg IntraVENous Q4H PRN    HYDROmorphone (PF) (DILAUDID) injection 0.2-0.3 mg  0.2-0.3 mg IntraVENous Q4H PRN    NUTRITIONAL SUPPORT ELECTROLYTE PRN ORDERS   Does Not Apply PRN    fat emulsion 20% (LIPOSYN, INTRALIPID) infusion 250 mL  250 mL IntraVENous QPM    sodium chloride (NS) flush 20 mL  20 mL InterCATHeter Q8H    heparin (porcine) pf 600 Units  600 Units InterCATHeter Q8H    sodium chloride (NS) flush 20 mL  20 mL InterCATHeter PRN    heparin (porcine) pf 600 Units  600 Units InterCATHeter PRN    acetaminophen (TYLENOL) tablet 1,000 mg  1,000 mg Oral Q6H PRN    oxyCODONE IR (ROXICODONE) tablet 5 mg  5 mg Oral Q4H PRN    Or    oxyCODONE IR (ROXICODONE) tablet 10 mg  10 mg Oral Q4H PRN    piperacillin-tazobactam (ZOSYN) 3.375 g in 0.9% sodium chloride (MBP/ADV) 100 mL  3.375 g IntraVENous Q8H     Morphine  Social History     Social History    Marital status:      Spouse name: N/A    Number of children: N/A    Years of education: N/A     Social History Main Topics    Smoking status: Former Smoker     Quit date: 1/1/1975    Smokeless tobacco: Never Used    Alcohol use No    Drug use: No    Sexual activity: Not on file     Other Topics Concern    Not on file     Social History Narrative     History   Smoking Status    Former Smoker    Quit date: 1/1/1975   Smokeless Tobacco    Never Used     Family History   Problem Relation Age of Onset    Diabetes Mother     Heart Disease Mother     Thyroid Disease Mother     Heart Disease Father      ROS:  Comprehensive review of systems was otherwise unremarkable except as noted above. Physical Exam:   Visit Vitals    /76    Pulse 70    Temp 98.6 °F (37 °C)    Resp 18    Ht 5' 4\" (1.626 m)    Wt 104.5 kg (230 lb 6.4 oz)    SpO2 96%    BMI 39.55 kg/m2     Constitutional: Alert, oriented, cooperative patient in no acute distress; appears stated age    Eyes:Sclera are clear. EOMs intact  ENMT: no external lesions gross hearing normal; no obvious neck masses, no ear or lip lesions, nares normal  CV: RRR. Normal perfusion  Resp: No JVD.  Breathing is  non-labored; no audible wheezing.    GI: obese; less firmness/fullness/mass-effect in RLQ;   Softer than at admission; tender to much deeper palpation; no guarding    Musculoskeletal: unremarkable with normal function. No embolic signs or cyanosis. Neuro:  Oriented; moves all 4; no focal deficits  Psychiatric: normal affect and mood, no memory impairment    Recent vitals (if inpt):  Patient Vitals for the past 24 hrs:   BP Temp Pulse Resp SpO2 Weight   09/27/17 0715 114/76 98.6 °F (37 °C) 70 18 96 % -   09/27/17 0300 113/63 98.1 °F (36.7 °C) 70 17 93 % -   09/26/17 2300 118/70 98.1 °F (36.7 °C) 69 17 95 % 104.5 kg (230 lb 6.4 oz)   09/26/17 1900 92/62 98.5 °F (36.9 °C) 65 17 96 % -   09/26/17 1539 130/69 97.8 °F (36.6 °C) 71 18 96 % -   09/26/17 1533 130/69 97.8 °F (36.6 °C) 71 18 96 % -   09/26/17 1051 111/58 98.4 °F (36.9 °C) (!) 55 18 97 % -       Labs:  Recent Labs      09/27/17   0512  09/25/17   0443   WBC   --   11.7*   HGB   --   11.0*   PLT   --   356   NA  141  140   K  4.5  4.2   CL  109*  107   CO2  25  24   BUN  17  19   CREA  0.59*  0.62   GLU  100  102*   TBILI   --   0.2   SGOT   --   34   ALT   --   38   AP   --   92       Lab Results   Component Value Date/Time    WBC 11.7 09/25/2017 04:43 AM    HGB 11.0 09/25/2017 04:43 AM    PLATELET 121 67/39/1481 04:43 AM    Sodium 141 09/27/2017 05:12 AM    Potassium 4.5 09/27/2017 05:12 AM    Chloride 109 09/27/2017 05:12 AM    CO2 25 09/27/2017 05:12 AM    BUN 17 09/27/2017 05:12 AM    Creatinine 0.59 09/27/2017 05:12 AM    Glucose 100 09/27/2017 05:12 AM    Bilirubin, total 0.2 09/25/2017 04:43 AM    AST (SGOT) 34 09/25/2017 04:43 AM    ALT (SGPT) 38 09/25/2017 04:43 AM    Alk. phosphatase 92 09/25/2017 04:43 AM       I reviewed recent labs and recent radiologic studies. I independently reviewed radiology images for studies I described above or studies I have ordered.    Admission date (for inpatients): 9/18/2017   * No surgery found *  * No surgery found *    ASSESSMENT/PLAN:  Problem List  Date Reviewed: 9/5/2017          Codes Class Noted    Massive Left Retroperitoneal fatty mass seen on CT scan extending from kidney to groin ICD-10-CM: R19.00  ICD-9-CM: 789.30  9/22/2017        Acute cystitis ICD-10-CM: N30.00  ICD-9-CM: 595.0  9/19/2017        RLQ abdominal mass ICD-10-CM: R19.03  ICD-9-CM: 789.33  9/19/2017        RLQ abdominal pain ICD-10-CM: R10.31  ICD-9-CM: 789.03  9/19/2017        Leukocytosis ICD-10-CM: F78.329  ICD-9-CM: 288.60  9/19/2017        Non-intractable vomiting with nausea ICD-10-CM: R11.2  ICD-9-CM: 787.01  9/19/2017        * (Principal)Intra-abdominal infection ICD-10-CM: B99.9  ICD-9-CM: 136.9  9/18/2017        Anxiety state, unspecified ICD-10-CM: F41.1  ICD-9-CM: 300.00  Unknown        GERD (gastroesophageal reflux disease) ICD-10-CM: K21.9  ICD-9-CM: 530.81  Unknown        Hypercholesterolemia ICD-10-CM: E78.00  ICD-9-CM: 272.0  Unknown            Principal Problem:    Intra-abdominal infection (9/18/2017)    Active Problems:    Acute cystitis (9/19/2017)      RLQ abdominal mass (9/19/2017)      RLQ abdominal pain (9/19/2017)      Leukocytosis (9/19/2017)      Non-intractable vomiting with nausea (9/19/2017)      Massive Left Retroperitoneal fatty mass seen on CT scan extending from kidney to groin (9/22/2017)       RLQ phlegmon involving bowel with RLQ pain, N/V and delayed presentation and 1 month prodrome  Starting to see some signs of progress on exam   NPO/Bowel rest, TPN via PICC  IV Zosyn   Repeat CT Monday  OR for expl laparotomy Tuesday if no better        Differential Dx  Ruptured (delayed presentation) appendictis with RLQ phelgmon  Malignancy (carcinoma, sarcoma, ovarian carcinoma)  Epiploicae Appendagitis  IBD  Diverticulitis  Ischemic Enteritis  Infectious Enteritis      In addition, there is also a left retroperitoneal infiltrative fatty mass-->rule out liposarcoma  Further workup for this on hold until we get resolution/stability of the RLQ inflammatory process  CEA and  were normal          Adult diaper for urinary incontinence - Pt couldn't make it to [facility] and wet bed  IV narcotic prn pain  IV Zofran/phenergan for nausea prn  GI (pepcid- add to TPN) and VTE (lovenox) prophylaxis ordered

## 2017-09-27 NOTE — PROGRESS NOTES
END OF SHIFT NOTE:    INTAKE/OUTPUT  09/26 0701 - 09/27 0700  In: 2485 [I.V.:2485]  Out: 806 [Urine:875]  Voiding: YES  Catheter: NO  Drain:              Flatus: Patient does have flatus present. Stool:  0 occurrences. Characteristics:  Stool Assessment  Stool Color: Brown  Stool Appearance: Soft  Stool Amount: Small  Stool Source/Status: Rectum    Emesis: 0 occurrences. Characteristics:        VITAL SIGNS  Patient Vitals for the past 12 hrs:   Temp Pulse Resp BP SpO2   09/27/17 0300 98.1 °F (36.7 °C) 70 17 113/63 93 %   09/26/17 2300 98.1 °F (36.7 °C) 69 17 118/70 95 %   09/26/17 1900 98.5 °F (36.9 °C) 65 17 92/62 96 %       Pain Assessment  Pain Intensity 1: 8 (09/26/17 2257)  Pain Location 1: Abdomen  Pain Intervention(s) 1: Medication (see MAR)  Patient Stated Pain Goal: 0    Ambulating  Yes    Shift report given to oncoming nurse at the bedside.     Agnes Pedraza RN

## 2017-09-28 PROCEDURE — 74011250636 HC RX REV CODE- 250/636: Performed by: SURGERY

## 2017-09-28 PROCEDURE — 65270000029 HC RM PRIVATE

## 2017-09-28 PROCEDURE — 74011250637 HC RX REV CODE- 250/637: Performed by: NURSE PRACTITIONER

## 2017-09-28 PROCEDURE — 74011000302 HC RX REV CODE- 302: Performed by: SURGERY

## 2017-09-28 PROCEDURE — 74011000258 HC RX REV CODE- 258: Performed by: SURGERY

## 2017-09-28 PROCEDURE — 74011000250 HC RX REV CODE- 250: Performed by: SURGERY

## 2017-09-28 PROCEDURE — 86580 TB INTRADERMAL TEST: CPT | Performed by: SURGERY

## 2017-09-28 RX ADMIN — HYDROMORPHONE HYDROCHLORIDE 0.3 MG: 1 INJECTION, SOLUTION INTRAMUSCULAR; INTRAVENOUS; SUBCUTANEOUS at 18:32

## 2017-09-28 RX ADMIN — Medication 20 ML: at 14:40

## 2017-09-28 RX ADMIN — ONDANSETRON 4 MG: 2 INJECTION INTRAMUSCULAR; INTRAVENOUS at 06:41

## 2017-09-28 RX ADMIN — ENOXAPARIN SODIUM 40 MG: 40 INJECTION SUBCUTANEOUS at 22:13

## 2017-09-28 RX ADMIN — Medication 10 ML: at 00:11

## 2017-09-28 RX ADMIN — PIPERACILLIN SODIUM,TAZOBACTAM SODIUM 3.38 G: 3; .375 INJECTION, POWDER, FOR SOLUTION INTRAVENOUS at 22:13

## 2017-09-28 RX ADMIN — SODIUM CHLORIDE 12.5 MG: 9 INJECTION INTRAMUSCULAR; INTRAVENOUS; SUBCUTANEOUS at 23:48

## 2017-09-28 RX ADMIN — CITALOPRAM HYDROBROMIDE 10 MG: 20 TABLET ORAL at 08:44

## 2017-09-28 RX ADMIN — TUBERCULIN PURIFIED PROTEIN DERIVATIVE 5 UNITS: 5 INJECTION, SOLUTION INTRADERMAL at 14:39

## 2017-09-28 RX ADMIN — PIPERACILLIN SODIUM,TAZOBACTAM SODIUM 3.38 G: 3; .375 INJECTION, POWDER, FOR SOLUTION INTRAVENOUS at 14:40

## 2017-09-28 RX ADMIN — SODIUM CHLORIDE 12.5 MG: 9 INJECTION INTRAMUSCULAR; INTRAVENOUS; SUBCUTANEOUS at 00:11

## 2017-09-28 RX ADMIN — HYDROMORPHONE HYDROCHLORIDE 0.3 MG: 1 INJECTION, SOLUTION INTRAMUSCULAR; INTRAVENOUS; SUBCUTANEOUS at 08:48

## 2017-09-28 RX ADMIN — Medication 20 ML: at 05:27

## 2017-09-28 RX ADMIN — POTASSIUM CHLORIDE: 2 INJECTION, SOLUTION, CONCENTRATE INTRAVENOUS at 17:26

## 2017-09-28 RX ADMIN — ENOXAPARIN SODIUM 40 MG: 40 INJECTION SUBCUTANEOUS at 08:43

## 2017-09-28 RX ADMIN — Medication 600 UNITS: at 22:14

## 2017-09-28 RX ADMIN — SOYBEAN OIL 250 ML: 20 INJECTION, SOLUTION INTRAVENOUS at 17:25

## 2017-09-28 RX ADMIN — Medication 600 UNITS: at 14:40

## 2017-09-28 RX ADMIN — PIPERACILLIN SODIUM,TAZOBACTAM SODIUM 3.38 G: 3; .375 INJECTION, POWDER, FOR SOLUTION INTRAVENOUS at 06:43

## 2017-09-28 RX ADMIN — HYDROMORPHONE HYDROCHLORIDE 0.3 MG: 1 INJECTION, SOLUTION INTRAMUSCULAR; INTRAVENOUS; SUBCUTANEOUS at 23:45

## 2017-09-28 NOTE — PROGRESS NOTES
END OF SHIFT NOTE:    INTAKE/OUTPUT  09/27 0701 - 09/28 0700  In: 1750 [I.V.:1750]  Out: 1000 [Urine:1000]  Voiding: YES  Catheter: YES  Drain:              Flatus: Patient does have flatus present. Stool:  1 occurrences. Characteristics:  Stool Assessment  Stool Color: Brown  Stool Appearance: Other (comment) (ribbon like)  Stool Amount: Small  Stool Source/Status: Rectum    Emesis: 0 occurrences. Characteristics:        VITAL SIGNS  Patient Vitals for the past 12 hrs:   Temp Pulse Resp BP SpO2   09/28/17 0358 98 °F (36.7 °C) 60 18 120/61 95 %   09/27/17 2255 98.3 °F (36.8 °C) 68 18 127/68 95 %   09/27/17 2044 98.8 °F (37.1 °C) 65 18 121/74 96 %       Pain Assessment  Pain Intensity 1: 0 (09/28/17 0030)  Pain Location 1: Head  Pain Intervention(s) 1: Medication (see MAR)  Patient Stated Pain Goal: 0    Ambulating  Yes    Shift report given to oncoming nurse at the bedside.     Zeenat Gracia RN

## 2017-09-28 NOTE — PROGRESS NOTES
Problem: Falls - Risk of  Goal: *Absence of Falls  Document Gia Fall Risk and appropriate interventions in the flowsheet.    Outcome: Progressing Towards Goal  Fall Risk Interventions:  Mobility Interventions: Patient to call before getting OOB           Medication Interventions: Patient to call before getting OOB, Teach patient to arise slowly     Elimination Interventions: Call light in reach

## 2017-09-28 NOTE — PROGRESS NOTES
H&P/Consult Note/Progress Note/Office Note:   Obey Sanchez  MRN: [id number]  :1940  Age:76 y.o.    HPI: Eric ÁLVAREZ 89 y. o. female who reports a 1 month h/o RLQ pain.        This was progressively worsening and associated with intermittent N/V. It was moderate in intensity (8/10) leading up to admission. Nothing makes it better or worse. She was treated previously with laxatives without improvement. She came the [facility] ER about 5 days prior to admission and was treated with antibiotics for UTI.  No imaging was done. She came back to the ER on the day of admission and CT scan was obtained as shown below. She is s/p lap francois at [facility] in approx .    She is s/p hysterectomy about 40 yrs ago for benign fibroids. Guillermina Christie thinks her ovaries remain. She denies associated bloody BMs or diarrhea.            Results for Rolan Gonzalez (MRN [id number]) as of 2017 09:35     Ref. Range 2017 16:42   CEA Latest Ref Range: 0.0 - 3.0 ng/mL 0.8   CA-125 Latest Ref Range: 1.5 - 35.0 U/mL 5           17 CT abd/pelvis with contrast with IV but not oral contrast   Minimal linear atelectasis or scarring is present in the lung bases, left greater than right. There is a small to moderate-sized hiatal hernia. No pleural or pericardial fluid.      Small hypoattenuating lesions are present in the right hepatic lobe and left kidney, too small to definitively characterize but statistically likely  representing cysts. There is central biliary ductal ectasia s/p cholecystectomy.      The remaining solid abdominal viscera are unremarkable to the limits of  noncontrast CT. Atherosclerotic abdominal aorta and branch vessels. No upper  abdominal no retroperitoneal lymph node enlargement. Nonopacified small bowel  loops are normal in caliber.      Pelvis:  There is somewhat prominent lipomatosis along the left posterior  pararenal fascia and iliopsoas without associated soft tissue component,  measuring up to 12.0 x 23 x 9 cm. The bladder is predominantly decompressed. Uterus not visualized.      Extensive diverticular changes are present in the colon which is otherwise  normal in caliber. There is a large ill-defined inflammatory process in the  right lower quadrant measuring 5.9 x 10.0 x 6.4 cm approximating the ileocecal  junction from which the appendix is not visualized as a distinctly separate  structure. Multiple high attenuation foci are present within or adjacent to the  bowel, suspicious for medication and/or enteroliths. There is moderate adjacent  inflammatory fat stranding and multiple prominent right lower quadrant  mesenteric lymph nodes, the largest of which measures 1.2 cm in short axis.      Trace dependent free pelvic fluid. No aggressive osseous lesions.      IMPRESSION:  Nonspecific inflammatory process in RLQ at the ileocecal junction. Diff Dx include infectious, and ischemic colitis, appendicitis, inflammatory bowel disease, omental infarct.       Large extraperitoneal predominantly fatty attenuation mass without definite enhancing soft tissue. Size places the patient at risk for sarcoma, the inferior extent of which is not included in the field of view.        9/25/17 CT abd/pelvis with oral and IV contrast   Tiny hypodense lesion near the dome of the liver and another seen within the right posterior lobe. These are too small to further characterize. Otherwise, the liver and spleen enhances homogeneously without discrete lesions. There is no biliary ductal dilatation. Clips are present from prior cholecystectomy. The pancreas and adrenal glands are normal. The kidneys  enhance symmetrically. Bowel loops in the upper abdomen are normal. No definite upper abdominal lymphadenopathy seen.     The RLQ phlegmon seen previously is again noted with significant bowel wall thickening involving the cecum and distal ileum. There is adjacent fat stranding.  The large extraperitoneal fatty mass discussed previously is again noted and is unchanged. The bladder and rectum are normal.  No pelvic adenopathy seen. No free air or free fluid seen within the pelvis.       IMPRESSION: No significant interval change when compared with the prior study.          9/19/17 Abd pain unchanged; Mass-effect in RLQ; PICC requested; on IV ABx; NPO  9/20/17 PICC placed yesterday; on TPN; Nausea persists;  RLQ pain no better since admission and still 8/10  Requests adult diapers secondary to urinary incontinence  9/21/17 says her pain is 8/10 but looks comfortable  9/22/17 pain issues improving  9/23/17 Awake in bed. No new complaints. 9/24/17 Resting in bed. No new complaints. Family at bedside.   9/25/17 Overall improvement in RLQ pain compared to admission  9/26/17 reports RLQ abd pain better since admission  9/27/17 no changes  9/28/17 she feels like RX starting to work with less RLQ pain          Past Medical History:   Diagnosis Date    Anxiety state, unspecified     GERD (gastroesophageal reflux disease)     Hypercholesterolemia      Past Surgical History:   Procedure Laterality Date    HX CHOLECYSTECTOMY      HX COLONOSCOPY      Dr Josefa Jansen 2012, repeat 5 years    HX GYN      hysterectomy, partial    HX KNEE REPLACEMENT      right     Current Facility-Administered Medications   Medication Dose Route Frequency    ondansetron (ZOFRAN ODT) tablet 4 mg  4 mg Oral Q6H PRN    citalopram (CELEXA) tablet 10 mg  10 mg Oral DAILY    enoxaparin (LOVENOX) injection 40 mg  40 mg SubCUTAneous Q12H    TPN ADULT - dextrose 10% amino acid 4.25%   IntraVENous QPM    ondansetron (ZOFRAN) injection 4 mg  4 mg IntraVENous Q4H PRN    promethazine (PHENERGAN) with saline injection 12.5 mg  12.5 mg IntraVENous Q4H PRN    HYDROmorphone (PF) (DILAUDID) injection 0.2-0.3 mg  0.2-0.3 mg IntraVENous Q4H PRN    NUTRITIONAL SUPPORT ELECTROLYTE PRN ORDERS   Does Not Apply PRN    fat emulsion 20% (LIPOSYN, INTRALIPID) infusion 250 mL 250 mL IntraVENous QPM    sodium chloride (NS) flush 20 mL  20 mL InterCATHeter Q8H    heparin (porcine) pf 600 Units  600 Units InterCATHeter Q8H    sodium chloride (NS) flush 20 mL  20 mL InterCATHeter PRN    heparin (porcine) pf 600 Units  600 Units InterCATHeter PRN    acetaminophen (TYLENOL) tablet 1,000 mg  1,000 mg Oral Q6H PRN    oxyCODONE IR (ROXICODONE) tablet 5 mg  5 mg Oral Q4H PRN    Or    oxyCODONE IR (ROXICODONE) tablet 10 mg  10 mg Oral Q4H PRN    piperacillin-tazobactam (ZOSYN) 3.375 g in 0.9% sodium chloride (MBP/ADV) 100 mL  3.375 g IntraVENous Q8H     Morphine  Social History     Social History    Marital status:      Spouse name: N/A    Number of children: N/A    Years of education: N/A     Social History Main Topics    Smoking status: Former Smoker     Quit date: 1/1/1975    Smokeless tobacco: Never Used    Alcohol use No    Drug use: No    Sexual activity: Not on file     Other Topics Concern    Not on file     Social History Narrative     History   Smoking Status    Former Smoker    Quit date: 1/1/1975   Smokeless Tobacco    Never Used     Family History   Problem Relation Age of Onset    Diabetes Mother     Heart Disease Mother     Thyroid Disease Mother     Heart Disease Father      ROS:  Comprehensive review of systems was otherwise unremarkable except as noted above. Physical Exam:   Visit Vitals    /73    Pulse 69    Temp 97.8 °F (36.6 °C)    Resp 18    Ht 5' 4\" (1.626 m)    Wt 106.4 kg (234 lb 9.6 oz)    SpO2 97%    BMI 40.27 kg/m2     Constitutional: Alert, oriented, cooperative patient in no acute distress; appears stated age    Eyes:Sclera are clear. EOMs intact  ENMT: no external lesions gross hearing normal; no obvious neck masses, no ear or lip lesions, nares normal  CV: RRR. Normal perfusion  Resp: No JVD.  Breathing is  non-labored; no audible wheezing.    GI: obese; less firmness/fullness/mass-effect in RLQ;   Softer than at admission; tender to much deeper palpation; no guarding    Musculoskeletal: unremarkable with normal function. No embolic signs or cyanosis. Neuro:  Oriented; moves all 4; no focal deficits  Psychiatric: normal affect and mood, no memory impairment    Recent vitals (if inpt):  Patient Vitals for the past 24 hrs:   BP Temp Pulse Resp SpO2 Weight   09/28/17 1125 127/73 97.8 °F (36.6 °C) 69 18 97 % -   09/28/17 0638 109/60 98 °F (36.7 °C) 66 18 94 % -   09/28/17 0535 - - - - - 106.4 kg (234 lb 9.6 oz)   09/28/17 0358 120/61 98 °F (36.7 °C) 60 18 95 % -   09/27/17 2255 127/68 98.3 °F (36.8 °C) 68 18 95 % -   09/27/17 2044 121/74 98.8 °F (37.1 °C) 65 18 96 % -   09/27/17 1452 133/63 97.9 °F (36.6 °C) 66 18 96 % -       Labs:  Recent Labs      09/27/17   0512   NA  141   K  4.5   CL  109*   CO2  25   BUN  17   CREA  0.59*   GLU  100       Lab Results   Component Value Date/Time    WBC 11.7 09/25/2017 04:43 AM    HGB 11.0 09/25/2017 04:43 AM    PLATELET 956 87/99/2782 04:43 AM    Sodium 141 09/27/2017 05:12 AM    Potassium 4.5 09/27/2017 05:12 AM    Chloride 109 09/27/2017 05:12 AM    CO2 25 09/27/2017 05:12 AM    BUN 17 09/27/2017 05:12 AM    Creatinine 0.59 09/27/2017 05:12 AM    Glucose 100 09/27/2017 05:12 AM    Bilirubin, total 0.2 09/25/2017 04:43 AM    AST (SGOT) 34 09/25/2017 04:43 AM    ALT (SGPT) 38 09/25/2017 04:43 AM    Alk. phosphatase 92 09/25/2017 04:43 AM       I reviewed recent labs and recent radiologic studies. I independently reviewed radiology images for studies I described above or studies I have ordered.    Admission date (for inpatients): 9/18/2017   * No surgery found *  Procedure(s):  LAPAROTOMY EXPLORATORY POSS BOWEL RESECTION/ 236    ASSESSMENT/PLAN:  Problem List  Date Reviewed: 9/5/2017          Codes Class Noted    Massive Left Retroperitoneal fatty mass seen on CT scan extending from kidney to groin ICD-10-CM: R19.00  ICD-9-CM: 789.30  9/22/2017        Acute cystitis ICD-10-CM: N30.00  ICD-9-CM: 595.0  9/19/2017        RLQ abdominal mass ICD-10-CM: R19.03  ICD-9-CM: 789.33  9/19/2017        RLQ abdominal pain ICD-10-CM: R10.31  ICD-9-CM: 789.03  9/19/2017        Leukocytosis ICD-10-CM: E82.734  ICD-9-CM: 288.60  9/19/2017        Non-intractable vomiting with nausea ICD-10-CM: R11.2  ICD-9-CM: 787.01  9/19/2017        * (Principal)Intra-abdominal infection ICD-10-CM: B99.9  ICD-9-CM: 136.9  9/18/2017        Anxiety state, unspecified ICD-10-CM: F41.1  ICD-9-CM: 300.00  Unknown        GERD (gastroesophageal reflux disease) ICD-10-CM: K21.9  ICD-9-CM: 530.81  Unknown        Hypercholesterolemia ICD-10-CM: E78.00  ICD-9-CM: 272.0  Unknown            Principal Problem:    Intra-abdominal infection (9/18/2017)    Active Problems:    Acute cystitis (9/19/2017)      RLQ abdominal mass (9/19/2017)      RLQ abdominal pain (9/19/2017)      Leukocytosis (9/19/2017)      Non-intractable vomiting with nausea (9/19/2017)      Massive Left Retroperitoneal fatty mass seen on CT scan extending from kidney to groin (9/22/2017)       RLQ phlegmon involving bowel with RLQ pain, N/V and delayed presentation and 1 month prodrome  Starting to see some signs of progress on exam   NPO/Bowel rest, TPN via PICC  IV Zosyn   Repeat CT Monday  OR for expl laparotomy Tuesday if no better        Differential Dx  Ruptured (delayed presentation) appendictis with RLQ phelgmon  Malignancy (carcinoma, sarcoma, ovarian carcinoma)  Epiploicae Appendagitis  IBD  Diverticulitis  Ischemic Enteritis  Infectious Enteritis      In addition, there is also a left retroperitoneal infiltrative fatty mass-->rule out liposarcoma  Further workup for this on hold until we get resolution/stability of the RLQ inflammatory process  CEA and  were normal          Adult diaper for urinary incontinence - Pt couldn't make it to [facility] and wet bed  IV narcotic prn pain  IV Zofran/phenergan for nausea prn  GI (pepcid- add to TPN) and VTE (lovenox) prophylaxis ordered

## 2017-09-28 NOTE — PROGRESS NOTES
Spoke briefly to Ms. Greg Brown and her son in room 236 about discharge planning. She lives alone in [place], but son lives about 25 minutes away, and could stay with her after discharge. Daughter is Deepak Rivers (cell [phone]). Discussed home (with or without home health), or possibly STR at a SNF. Will re-visit later.

## 2017-09-28 NOTE — PROGRESS NOTES
Problem: Nutrition Deficit  Goal: *Optimize nutritional status  Nutrition F/U  TPN management (Dr. Jeffrey Guillen)  Assessment:   · Diet order(s): 9-18: NPO  · Central line access: Double lumen PICC  · TPN dependent d/t RLQ infected phlegmon/mass with pain N/V. Repeat CT Monday (10-2),OR for expl laparotomy Tuesday (10-3) if no better  · Labs are MWF  · Last 3 Recorded Weights in this Encounter   ·  · 09/26/17 0017 · 09/26/17 2300 · 09/28/17 0535   · Weight: · 104.5 kg (230 lb 4.8 oz) · 104.5 kg (230 lb 6.4 oz) · 106.4 kg (234 lb 9.6 oz)   · Macronutrient needs:  EER:  7320-0214 kcal /day (13-18 kcal/kg actual BW)  EPR:  65-82 grams protein/day (1.2-1.5 grams/kg IBW). Dr Danyell Birmingham standard request is 2 gm/kg NFW=630 g PRO/d)  Max CHO:  314 grams/day (4mg/kg IBW/min)  Fluid:  1ml/kcal  Intake/Comparative Standards: 2L/d of 10%DEX/ 4.25%AA at 85 ml/hr with 250 ml 20% Lipids daily provides 1520 kcal/d (100% of needs), 85 grams of protein/d (1.56 grams of protein/kg of IBW), 200 grams of CHO/d (does not exceed maximum CHO load) and ~2300 ml of total volume/d (100% of needs). Intervention:   Meals and snacks: NPO  Nutritional Supplement Therapy: Electrolyte replacement per nutritional support protocols are active on MAR. PN:   1. Continue current TPN. 2. If 2 grams protein/kg is desired for this pt, this could be met with 10%DEX/ 4.25%AA at 105 ml/hr with 250 ml 20% Lipids daily provides 1785 kcal/d (100% of needs), 107 grams of protein/d (1.96 grams of protein/kg of IBW), 252 grams of CHO/d (does not exceed maximum CHO load) and ~2770 ml of total volume/d (>100% of needs) vs 2L/d of 15%DEX/5%AA at 85 ml/hr with 250 ml 20% Lipids daily provides 1920 kcal/d (85% of needs), 100 grams of protein/d (1.83 grams of protein/kg of IBW),300 grams of CHO/d (does not exceed maximum CHO load) and ~2300 ml of total volume/d (>100% of needs)  3.  Continue labs [facility]      Bryan Henriquez, 66 [address], [facility] [address] [place] [address]

## 2017-09-29 LAB
ANION GAP SERPL CALC-SCNC: 6 MMOL/L (ref 7–16)
BACTERIA SPEC CULT: NORMAL
BUN SERPL-MCNC: 15 MG/DL (ref 8–23)
CALCIUM SERPL-MCNC: 8.4 MG/DL (ref 8.3–10.4)
CHLORIDE SERPL-SCNC: 108 MMOL/L (ref 98–107)
CO2 SERPL-SCNC: 28 MMOL/L (ref 21–32)
CREAT SERPL-MCNC: 0.55 MG/DL (ref 0.6–1)
ERYTHROCYTE [DISTWIDTH] IN BLOOD BY AUTOMATED COUNT: 16.5 % (ref 11.9–14.6)
GLUCOSE SERPL-MCNC: 89 MG/DL (ref 65–100)
HCT VFR BLD AUTO: 33.1 % (ref 35.8–46.3)
HGB BLD-MCNC: 9.8 G/DL (ref 11.7–15.4)
MAGNESIUM SERPL-MCNC: 1.9 MG/DL (ref 1.8–2.4)
MCH RBC QN AUTO: 24.8 PG (ref 26.1–32.9)
MCHC RBC AUTO-ENTMCNC: 29.6 G/DL (ref 31.4–35)
MCV RBC AUTO: 83.8 FL (ref 79.6–97.8)
MM INDURATION POC: NORMAL MM (ref 0–5)
PHOSPHATE SERPL-MCNC: 3.5 MG/DL (ref 2.3–3.7)
PLATELET # BLD AUTO: 297 K/UL (ref 150–450)
PMV BLD AUTO: 10.7 FL (ref 10.8–14.1)
POTASSIUM SERPL-SCNC: 4.2 MMOL/L (ref 3.5–5.1)
PPD POC: NORMAL NEGATIVE
RBC # BLD AUTO: 3.95 M/UL (ref 4.05–5.25)
SERVICE CMNT-IMP: NORMAL
SODIUM SERPL-SCNC: 142 MMOL/L (ref 136–145)
WBC # BLD AUTO: 4.9 K/UL (ref 4.3–11.1)

## 2017-09-29 PROCEDURE — 74011250637 HC RX REV CODE- 250/637: Performed by: NURSE PRACTITIONER

## 2017-09-29 PROCEDURE — 97162 PT EVAL MOD COMPLEX 30 MIN: CPT

## 2017-09-29 PROCEDURE — 36592 COLLECT BLOOD FROM PICC: CPT

## 2017-09-29 PROCEDURE — 74011000250 HC RX REV CODE- 250: Performed by: SURGERY

## 2017-09-29 PROCEDURE — 74011250636 HC RX REV CODE- 250/636: Performed by: SURGERY

## 2017-09-29 PROCEDURE — 74011000258 HC RX REV CODE- 258: Performed by: SURGERY

## 2017-09-29 PROCEDURE — 84100 ASSAY OF PHOSPHORUS: CPT | Performed by: SURGERY

## 2017-09-29 PROCEDURE — 83735 ASSAY OF MAGNESIUM: CPT | Performed by: SURGERY

## 2017-09-29 PROCEDURE — 85027 COMPLETE CBC AUTOMATED: CPT | Performed by: SURGERY

## 2017-09-29 PROCEDURE — 65270000029 HC RM PRIVATE

## 2017-09-29 PROCEDURE — 80048 BASIC METABOLIC PNL TOTAL CA: CPT | Performed by: SURGERY

## 2017-09-29 RX ADMIN — ENOXAPARIN SODIUM 40 MG: 40 INJECTION SUBCUTANEOUS at 20:27

## 2017-09-29 RX ADMIN — PIPERACILLIN SODIUM,TAZOBACTAM SODIUM 3.38 G: 3; .375 INJECTION, POWDER, FOR SOLUTION INTRAVENOUS at 06:24

## 2017-09-29 RX ADMIN — Medication 600 UNITS: at 06:24

## 2017-09-29 RX ADMIN — PIPERACILLIN SODIUM,TAZOBACTAM SODIUM 3.38 G: 3; .375 INJECTION, POWDER, FOR SOLUTION INTRAVENOUS at 22:29

## 2017-09-29 RX ADMIN — ENOXAPARIN SODIUM 40 MG: 40 INJECTION SUBCUTANEOUS at 08:10

## 2017-09-29 RX ADMIN — HYDROMORPHONE HYDROCHLORIDE 0.3 MG: 1 INJECTION, SOLUTION INTRAMUSCULAR; INTRAVENOUS; SUBCUTANEOUS at 22:29

## 2017-09-29 RX ADMIN — POTASSIUM CHLORIDE: 2 INJECTION, SOLUTION, CONCENTRATE INTRAVENOUS at 17:45

## 2017-09-29 RX ADMIN — SODIUM CHLORIDE 12.5 MG: 9 INJECTION INTRAMUSCULAR; INTRAVENOUS; SUBCUTANEOUS at 22:29

## 2017-09-29 RX ADMIN — SOYBEAN OIL 250 ML: 20 INJECTION, SOLUTION INTRAVENOUS at 17:45

## 2017-09-29 RX ADMIN — Medication 20 ML: at 22:51

## 2017-09-29 RX ADMIN — Medication 600 UNITS: at 22:29

## 2017-09-29 RX ADMIN — ONDANSETRON 4 MG: 2 INJECTION INTRAMUSCULAR; INTRAVENOUS at 15:28

## 2017-09-29 RX ADMIN — CITALOPRAM HYDROBROMIDE 10 MG: 20 TABLET ORAL at 08:10

## 2017-09-29 RX ADMIN — PIPERACILLIN SODIUM,TAZOBACTAM SODIUM 3.38 G: 3; .375 INJECTION, POWDER, FOR SOLUTION INTRAVENOUS at 15:29

## 2017-09-29 RX ADMIN — Medication 20 ML: at 06:24

## 2017-09-29 NOTE — PROGRESS NOTES
Problem: Nutrition Deficit  Goal: *Optimize nutritional status  Nutrition F/U: TPN Management- Reviewed, no change to TPN at this time.    Lenore Mckenzie, [address] [place] [address], [address] [place] [address]

## 2017-09-29 NOTE — PROGRESS NOTES
Problem: Mobility Impaired (Adult and Pediatric)  Goal: *Acute Goals and Plan of Care (Insert Text)  To be determined post surgery on 10/2      PHYSICAL THERAPY: INITIAL ASSESSMENT 9/29/2017  INPATIENT: Hospital Day: 12  [place] / [place] / Product Type: Medicare /      NAME/AGE/GENDER: Napoleon Arce is a 68 y.o. female             PRIMARY DIAGNOSIS: infected mass  Intra-abdominal infection  INFLAMMATORY RIGHT LOWER QUADRANT MASS Intra-abdominal infection Intra-abdominal infection  Procedure(s) (LRB):  LAPAROTOMY EXPLORATORY POSS BOWEL RESECTION/ 236 (N/A)     ICD-10: Treatment Diagnosis:       · Difficulty in walking, Not elsewhere classified (R26.2)   Precaution/Allergies:  Morphine       ASSESSMENT:      Ms. Goyo Gresham was admitted due to above and is scheduled to have surgery on 10/2. At this time, patient is moving well with all mobility and has normal strength. Patient ambulated in hallway without assistive device while I pushed the IV pole. Patient returned to room and discussed plan of care. Encouraged patient to ambulate with family and staff this weekend and on Monday. Patient commented she hasn't yet as she did not know how to coordinate all the IV poles and if she was allowed. Patient agreed to mobilize as able and be up in the chair often during the day. We will plan to re-assess patient after her abdominal surgery on 10/2 to determine her rehab needs. At this time she does not need PT; but after surgery, she will likely need assistance and possibly rehab placement as she lives alone. At this time, patient is functioning at baseline independent and encouraged to ambulate and perform mobility throughout the day. Will reassess patient once surgery is completed and determine plan of care and discharge needs at that time. This section established at most recent assessment   PROBLEM LIST (Impairments causing functional limitations):  1.  None at this time, will be determined post surgery    INTERVENTIONS PLANNED: (Benefits and precautions of physical therapy have been discussed with the patient.)  1. Encouraged mobility in hospital      TREATMENT PLAN: Frequency/Duration: Will hold at this time and reassess post surgery  Rehabilitation Potential For Stated Goals: TO BE DETERMINED      RECOMMENDED REHABILITATION/EQUIPMENT: (at time of discharge pending progress): Due to the probability of continued deficits (see above) this patient will likely need continued skilled physical therapy after discharge. Equipment:   · to be determined                   HISTORY:   History of Present Injury/Illness (Reason for Referral):  Per H&P[de-identified] Baldemar Sherman is a 68 y.o. female who reports a 1 month h/o RLQ pain. This has been worsening and is associated with intermittent N/V. It is moderate in intensity  Nothing makes it better or worse. She was treated previously with laxatives without imp[rovement. *She came the [facility] ER about 5 days ago and was treated with antibiotics for UTI. No imaging was done. *She came back to the ER today and CT obtained as shown below. She is s/p lap robbi at [facility] approx 2012. She is s/p hysterectomy about 40 yrs ago for benign fibroids. She thinks her ovaries remain. She denies bloody BMs or diarrhea. Past Medical History/Comorbidities:   Ms. Kermit Pardo  has a past medical history of Anxiety state, unspecified; GERD (gastroesophageal reflux disease); and Hypercholesterolemia. Ms. Kermit Pardo  has a past surgical history that includes knee replacement; gyn; cholecystectomy; and colonoscopy. Social History/Living Environment:   Home Environment: Private residence  One/Two Story Residence: One story  Living Alone: No  Support Systems: Child(zeenat)  Patient Expects to be Discharged to[de-identified] Private residence  Current DME Used/Available at Home: None  Prior Level of Function/Work/Activity:  Patient lives alone in mobile home with 3-4 steps to enter.   Personal Factors:          Overall Behavior:  motivated   Number of Personal Factors/Comorbidities that affect the Plan of Care: 3+: HIGH COMPLEXITY   EXAMINATION:   Most Recent Physical Functioning:   Gross Assessment:  AROM: Within functional limits  Strength: Within functional limits  Coordination: Within functional limits  Sensation: Intact               Posture:  Posture (WDL): Exceptions to WDL  Posture Assessment: Forward head  Balance:  Sitting: Intact  Standing: Impaired  Standing - Static: Good  Standing - Dynamic : Fair Bed Mobility:  Rolling: Supervision  Supine to Sit: Supervision  Wheelchair Mobility:     Transfers:  Sit to Stand: Supervision  Stand to Sit: Supervision  Bed to Chair: Supervision  Gait:     Base of Support: Narrowed  Speed/Bhumika: Slow  Gait Abnormalities: Path deviations  Distance (ft): 150 Feet (ft)  Assistive Device:  (none)  Ambulation - Level of Assistance: Supervision  Interventions: Verbal cues; Safety awareness training       Body Structures Involved:  1. abdomen Body Functions Affected:  1. Movement Related Activities and Participation Affected:  1. General Tasks and Demands  2. Mobility  3. Self Care   Number of elements that affect the Plan of Care: 4+: HIGH COMPLEXITY   CLINICAL PRESENTATION:   Presentation: Evolving clinical presentation with changing clinical characteristics: MODERATE COMPLEXITY   CLINICAL DECISION MAKIN [facility] Inpatient Short Form  How much difficulty does the patient currently have. .. Unable A Lot A Little None   1. Turning over in bed (including adjusting bedclothes, sheets and blankets)? [ ] 1   [ ] 2   [ ] 3   [X] 4   2. Sitting down on and standing up from a chair with arms ( e.g., wheelchair, bedside commode, etc.)   [ ] 1   [ ] 2   [ ] 3   [X] 4   3. Moving from lying on back to sitting on the side of the bed?    [ ] 1   [ ] 2   [ ] 3   [X] 4   How much help from another person does the patient currently need. .. Total A Lot A Little None   4. Moving to and from a bed to a chair (including a wheelchair)? [ ] 1   [ ] 2   [ ] 3   [X] 4   5. Need to walk in hospital room? [ ] 1   [ ] 2   [X] 3   [ ] 4   6. Climbing 3-5 steps with a railing? [ ] 1   [ ] 2   [X] 3   [ ] 4   © 2007, Trustees of [address] [place] [address] [address] [address], under license to [employer]. All rights reserved    Score:  Initial: 22 Most Recent: X (Date: -- )     Interpretation of Tool:  Represents activities that are increasingly more difficult (i.e. Bed mobility, Transfers, Gait). Score 24 23 22-20 19-15 14-10 9-7 6       Modifier CH CI CJ CK CL CM CN         · Mobility - Walking and Moving Around:               - CURRENT STATUS:    CJ - 20%-39% impaired, limited or restricted               - GOAL STATUS:           CI - 1%-19% impaired, limited or restricted               - D/C STATUS:                       ---------------To be determined---------------  [place] / Product Type: Medicare /       Medical Necessity:     · Skilled intervention continues to be required due to patient having abdominal surgery on 10/2. Reason for Services/Other Comments:  · Patient continues to require skilled intervention due to medical complications. Use of outcome tool(s) and clinical judgement create a POC that gives a: Questionable prediction of patient's progress: MODERATE COMPLEXITY                 TREATMENT:   (In addition to Assessment/Re-Assessment sessions the following treatments were rendered)   Pre-treatment Symptoms/Complaints:   \"Oh I didn't know I could do that\"  Pain: Initial:   Pain Intensity 1: 0  Pain Intervention(s) 1: Ambulation/Increased Activity, Nurse notified, Repositioned  Post Session:  None noted      Assessment/Reassessment only, no treatment provided today     Braces/Orthotics/Lines/Etc:   · IV  · Room air  Treatment/Session Assessment:    · Response to Treatment:  Tolerated well, encouraged mobility  · Interdisciplinary Collaboration:  · Physical Therapist  · Registered Nurse  ·   · After treatment position/precautions:  · Up in chair  · Call light within reach  · RN notified  · Compliance with Program/Exercises: compliant all of the time. · Recommendations/Intent for next treatment session: \"Next visit will focus on needs of patient and discharge plan\".   Total Treatment Duration:  PT Patient Time In/Time Out  Time In: 0925  Time Out: Armando Hewitt 92, DPT

## 2017-09-29 NOTE — PROGRESS NOTES
H&P/Consult Note/Progress Note/Office Note:   Manju Barrios  MRN: [id number]  :1940  Age:76 y.o.    HPI: Faraz Garcia Z 87 y. o. female who reports a 1 month h/o RLQ pain.        This was progressively worsening and associated with intermittent N/V. It was moderate in intensity (8/10) leading up to admission. Nothing makes it better or worse. She was treated previously with laxatives without improvement. She came the [facility] ER about 5 days prior to admission and was treated with antibiotics for UTI.  No imaging was done. She came back to the ER on the day of admission and CT scan was obtained as shown below. She is s/p lap francois at [facility] in approx .    She is s/p hysterectomy about 40 yrs ago for benign fibroids. Sunil Samples thinks her ovaries remain. She denies associated bloody BMs or diarrhea.            Results for Dona Santos (MRN [id number]) as of 2017 09:35     Ref. Range 2017 16:42   CEA Latest Ref Range: 0.0 - 3.0 ng/mL 0.8   CA-125 Latest Ref Range: 1.5 - 35.0 U/mL 5           17 CT abd/pelvis with contrast with IV but not oral contrast   Minimal linear atelectasis or scarring is present in the lung bases, left greater than right. There is a small to moderate-sized hiatal hernia. No pleural or pericardial fluid.      Small hypoattenuating lesions are present in the right hepatic lobe and left kidney, too small to definitively characterize but statistically likely  representing cysts. There is central biliary ductal ectasia s/p cholecystectomy.      The remaining solid abdominal viscera are unremarkable to the limits of  noncontrast CT. Atherosclerotic abdominal aorta and branch vessels. No upper  abdominal no retroperitoneal lymph node enlargement. Nonopacified small bowel  loops are normal in caliber.      Pelvis:  There is somewhat prominent lipomatosis along the left posterior  pararenal fascia and iliopsoas without associated soft tissue component,  measuring up to 12.0 x 23 x 9 cm. The bladder is predominantly decompressed. Uterus not visualized.      Extensive diverticular changes are present in the colon which is otherwise  normal in caliber. There is a large ill-defined inflammatory process in the  right lower quadrant measuring 5.9 x 10.0 x 6.4 cm approximating the ileocecal  junction from which the appendix is not visualized as a distinctly separate  structure. Multiple high attenuation foci are present within or adjacent to the  bowel, suspicious for medication and/or enteroliths. There is moderate adjacent  inflammatory fat stranding and multiple prominent right lower quadrant  mesenteric lymph nodes, the largest of which measures 1.2 cm in short axis.      Trace dependent free pelvic fluid. No aggressive osseous lesions.      IMPRESSION:  Nonspecific inflammatory process in RLQ at the ileocecal junction. Diff Dx include infectious, and ischemic colitis, appendicitis, inflammatory bowel disease, omental infarct.       Large extraperitoneal predominantly fatty attenuation mass without definite enhancing soft tissue. Size places the patient at risk for sarcoma, the inferior extent of which is not included in the field of view.        9/25/17 CT abd/pelvis with oral and IV contrast   Tiny hypodense lesion near the dome of the liver and another seen within the right posterior lobe. These are too small to further characterize. Otherwise, the liver and spleen enhances homogeneously without discrete lesions. There is no biliary ductal dilatation. Clips are present from prior cholecystectomy. The pancreas and adrenal glands are normal. The kidneys  enhance symmetrically. Bowel loops in the upper abdomen are normal. No definite upper abdominal lymphadenopathy seen.     The RLQ phlegmon seen previously is again noted with significant bowel wall thickening involving the cecum and distal ileum. There is adjacent fat stranding.  The large extraperitoneal fatty mass discussed previously is again noted and is unchanged. The bladder and rectum are normal.  No pelvic adenopathy seen. No free air or free fluid seen within the pelvis.       IMPRESSION: No significant interval change when compared with the prior study.          9/19/17 Abd pain unchanged; Mass-effect in RLQ; PICC requested; on IV ABx; NPO  9/20/17 PICC placed yesterday; on TPN; Nausea persists;  RLQ pain no better since admission and still 8/10  Requests adult diapers secondary to urinary incontinence  9/21/17 says her pain is 8/10 but looks comfortable  9/22/17 pain issues improving  9/23/17 Awake in bed. No new complaints. 9/24/17 Resting in bed. No new complaints. Family at bedside.   9/25/17 Overall improvement in RLQ pain compared to admission  9/26/17 reports RLQ abd pain better since admission  9/27/17 no changes  9/28/17 she feels like RX starting to work with less RLQ pain  9/29/17 slow but steady improvement clinically; still with RLQ pain          Past Medical History:   Diagnosis Date    Anxiety state, unspecified     GERD (gastroesophageal reflux disease)     Hypercholesterolemia      Past Surgical History:   Procedure Laterality Date    HX CHOLECYSTECTOMY      HX COLONOSCOPY      Dr Cheng Side 2012, repeat 5 years    HX GYN      hysterectomy, partial    HX KNEE REPLACEMENT      right     Current Facility-Administered Medications   Medication Dose Route Frequency    tuberculin injection 5 Units  5 Units IntraDERMal ONCE    ondansetron (ZOFRAN ODT) tablet 4 mg  4 mg Oral Q6H PRN    citalopram (CELEXA) tablet 10 mg  10 mg Oral DAILY    enoxaparin (LOVENOX) injection 40 mg  40 mg SubCUTAneous Q12H    TPN ADULT - dextrose 10% amino acid 4.25%   IntraVENous QPM    ondansetron (ZOFRAN) injection 4 mg  4 mg IntraVENous Q4H PRN    promethazine (PHENERGAN) with saline injection 12.5 mg  12.5 mg IntraVENous Q4H PRN    HYDROmorphone (PF) (DILAUDID) injection 0.2-0.3 mg  0.2-0.3 mg IntraVENous Q4H PRN  NUTRITIONAL SUPPORT ELECTROLYTE PRN ORDERS   Does Not Apply PRN    fat emulsion 20% (LIPOSYN, INTRALIPID) infusion 250 mL  250 mL IntraVENous QPM    sodium chloride (NS) flush 20 mL  20 mL InterCATHeter Q8H    heparin (porcine) pf 600 Units  600 Units InterCATHeter Q8H    sodium chloride (NS) flush 20 mL  20 mL InterCATHeter PRN    heparin (porcine) pf 600 Units  600 Units InterCATHeter PRN    acetaminophen (TYLENOL) tablet 1,000 mg  1,000 mg Oral Q6H PRN    oxyCODONE IR (ROXICODONE) tablet 5 mg  5 mg Oral Q4H PRN    Or    oxyCODONE IR (ROXICODONE) tablet 10 mg  10 mg Oral Q4H PRN    piperacillin-tazobactam (ZOSYN) 3.375 g in 0.9% sodium chloride (MBP/ADV) 100 mL  3.375 g IntraVENous Q8H     Morphine  Social History     Social History    Marital status:      Spouse name: N/A    Number of children: N/A    Years of education: N/A     Social History Main Topics    Smoking status: Former Smoker     Quit date: 1/1/1975    Smokeless tobacco: Never Used    Alcohol use No    Drug use: No    Sexual activity: Not on file     Other Topics Concern    Not on file     Social History Narrative     History   Smoking Status    Former Smoker    Quit date: 1/1/1975   Smokeless Tobacco    Never Used     Family History   Problem Relation Age of Onset    Diabetes Mother     Heart Disease Mother     Thyroid Disease Mother     Heart Disease Father      ROS:  Comprehensive review of systems was otherwise unremarkable except as noted above. Physical Exam:   Visit Vitals    /80    Pulse 66    Temp 97.9 °F (36.6 °C)    Resp 18    Ht 5' 4\" (1.626 m)    Wt 106.1 kg (234 lb)    SpO2 97%    BMI 40.17 kg/m2     Constitutional: Alert, oriented, cooperative patient in no acute distress; appears stated age    Eyes:Sclera are clear. EOMs intact  ENMT: no external lesions gross hearing normal; no obvious neck masses, no ear or lip lesions, nares normal  CV: RRR. Normal perfusion  Resp: No JVD.  Breathing is  non-labored; no audible wheezing.    GI: obese; less firmness/fullness/mass-effect in RLQ; Softer than at admission; tender to much deeper palpation; no guarding    Musculoskeletal: unremarkable with normal function. No embolic signs or cyanosis. Neuro:  Oriented; moves all 4; no focal deficits  Psychiatric: normal affect and mood, no memory impairment    Recent vitals (if inpt):  Patient Vitals for the past 24 hrs:   BP Temp Pulse Resp SpO2 Weight   09/29/17 0739 121/80 97.9 °F (36.6 °C) 66 18 97 % -   09/29/17 0240 - - - - - 106.1 kg (234 lb)   09/29/17 0000 132/78 98.1 °F (36.7 °C) 66 18 96 % -   09/28/17 2000 125/72 98.3 °F (36.8 °C) 65 18 94 % -   09/28/17 1452 104/65 98.2 °F (36.8 °C) 63 18 96 % -   09/28/17 1125 127/73 97.8 °F (36.6 °C) 69 18 97 % -       Labs:  Recent Labs      09/29/17   0459   WBC  4.9   HGB  9.8*   PLT  297   NA  142   K  4.2   CL  108*   CO2  28   BUN  15   CREA  0.55*   GLU  89       Lab Results   Component Value Date/Time    WBC 4.9 09/29/2017 04:59 AM    HGB 9.8 09/29/2017 04:59 AM    PLATELET 314 27/70/5361 04:59 AM    Sodium 142 09/29/2017 04:59 AM    Potassium 4.2 09/29/2017 04:59 AM    Chloride 108 09/29/2017 04:59 AM    CO2 28 09/29/2017 04:59 AM    BUN 15 09/29/2017 04:59 AM    Creatinine 0.55 09/29/2017 04:59 AM    Glucose 89 09/29/2017 04:59 AM    Bilirubin, total 0.2 09/25/2017 04:43 AM    AST (SGOT) 34 09/25/2017 04:43 AM    ALT (SGPT) 38 09/25/2017 04:43 AM    Alk. phosphatase 92 09/25/2017 04:43 AM       I reviewed recent labs and recent radiologic studies. I independently reviewed radiology images for studies I described above or studies I have ordered.    Admission date (for inpatients): 9/18/2017   * No surgery found *  Procedure(s):  LAPAROTOMY EXPLORATORY POSS BOWEL RESECTION/ 236    ASSESSMENT/PLAN:  Problem List  Date Reviewed: 9/5/2017          Codes Class Noted    Massive Left Retroperitoneal fatty mass seen on CT scan extending from kidney to groin ICD-10-CM: R19.00  ICD-9-CM: 789.30  9/22/2017        Acute cystitis ICD-10-CM: N30.00  ICD-9-CM: 595.0  9/19/2017        RLQ abdominal mass ICD-10-CM: R19.03  ICD-9-CM: 789.33  9/19/2017        RLQ abdominal pain ICD-10-CM: R10.31  ICD-9-CM: 789.03  9/19/2017        Leukocytosis ICD-10-CM: O90.921  ICD-9-CM: 288.60  9/19/2017        Non-intractable vomiting with nausea ICD-10-CM: R11.2  ICD-9-CM: 787.01  9/19/2017        * (Principal)Intra-abdominal infection ICD-10-CM: B99.9  ICD-9-CM: 136.9  9/18/2017        Anxiety state, unspecified ICD-10-CM: F41.1  ICD-9-CM: 300.00  Unknown        GERD (gastroesophageal reflux disease) ICD-10-CM: K21.9  ICD-9-CM: 530.81  Unknown        Hypercholesterolemia ICD-10-CM: E78.00  ICD-9-CM: 272.0  Unknown            Principal Problem:    Intra-abdominal infection (9/18/2017)    Active Problems:    Acute cystitis (9/19/2017)      RLQ abdominal mass (9/19/2017)      RLQ abdominal pain (9/19/2017)      Leukocytosis (9/19/2017)      Non-intractable vomiting with nausea (9/19/2017)      Massive Left Retroperitoneal fatty mass seen on CT scan extending from kidney to groin (9/22/2017)       RLQ phlegmon involving bowel with RLQ pain, N/V and delayed presentation and 1 month prodrome  Starting to see some signs of progress on exam   NPO/Bowel rest, TPN via PICC  IV Zosyn   Repeat CT Monday  OR for expl laparotomy Tuesday if no better        Differential Dx  Ruptured (delayed presentation) appendictis with RLQ phelgmon  Malignancy (carcinoma, sarcoma, ovarian carcinoma)  Epiploicae Appendagitis  IBD  Diverticulitis  Ischemic Enteritis  Infectious Enteritis      In addition, there is also a left retroperitoneal infiltrative fatty mass-->rule out liposarcoma  Further workup for this on hold until we get resolution/stability of the RLQ inflammatory process  CEA and  were normal          Adult diaper for urinary incontinence - Pt couldn't make it to [facility] and wet bed  IV narcotic prn pain  IV Zofran/phenergan for nausea prn  GI (pepcid- add to TPN) and VTE (lovenox) prophylaxis ordered

## 2017-09-29 NOTE — PROGRESS NOTES
END OF SHIFT NOTE:    INTAKE/OUTPUT  09/28 0701 - 09/29 0700  In: 1828 [I.V.:1828]  Out: 1600 [Urine:1600]  Voiding: YES  Catheter: NO  Drain:              Flatus: Patient does have flatus present. Stool:  0 occurrences. Characteristics:  Stool Assessment  Stool Color: Brown  Stool Appearance: Other (comment) (ribbon like)  Stool Amount: Small  Stool Source/Status: Rectum    Emesis: 0 occurrences. Characteristics:        VITAL SIGNS  Patient Vitals for the past 12 hrs:   Temp Pulse Resp BP SpO2   09/29/17 0000 98.1 °F (36.7 °C) 66 18 132/78 96 %   09/28/17 2000 98.3 °F (36.8 °C) 65 18 125/72 94 %       Pain Assessment  Pain Intensity 1: 0 (09/29/17 0430)  Pain Location 1: Abdomen  Pain Intervention(s) 1: Medication (see MAR)  Patient Stated Pain Goal: 0    Ambulating  Yes    Shift report given to oncoming nurse at the bedside.     Aysha Jimenez RN

## 2017-09-30 LAB
ANION GAP SERPL CALC-SCNC: 5 MMOL/L (ref 7–16)
BUN SERPL-MCNC: 15 MG/DL (ref 8–23)
CALCIUM SERPL-MCNC: 8.4 MG/DL (ref 8.3–10.4)
CHLORIDE SERPL-SCNC: 108 MMOL/L (ref 98–107)
CO2 SERPL-SCNC: 28 MMOL/L (ref 21–32)
CREAT SERPL-MCNC: 0.6 MG/DL (ref 0.6–1)
ERYTHROCYTE [DISTWIDTH] IN BLOOD BY AUTOMATED COUNT: 16.6 % (ref 11.9–14.6)
GLUCOSE SERPL-MCNC: 89 MG/DL (ref 65–100)
HCT VFR BLD AUTO: 32.1 % (ref 35.8–46.3)
HGB BLD-MCNC: 9.9 G/DL (ref 11.7–15.4)
MCH RBC QN AUTO: 25.1 PG (ref 26.1–32.9)
MCHC RBC AUTO-ENTMCNC: 30.8 G/DL (ref 31.4–35)
MCV RBC AUTO: 81.3 FL (ref 79.6–97.8)
MM INDURATION POC: NORMAL MM (ref 0–5)
PLATELET # BLD AUTO: 289 K/UL (ref 150–450)
PMV BLD AUTO: 10.3 FL (ref 10.8–14.1)
POTASSIUM SERPL-SCNC: 4.2 MMOL/L (ref 3.5–5.1)
PPD POC: NORMAL NEGATIVE
RBC # BLD AUTO: 3.95 M/UL (ref 4.05–5.25)
SODIUM SERPL-SCNC: 141 MMOL/L (ref 136–145)
WBC # BLD AUTO: 5.9 K/UL (ref 4.3–11.1)

## 2017-09-30 PROCEDURE — 85027 COMPLETE CBC AUTOMATED: CPT | Performed by: SURGERY

## 2017-09-30 PROCEDURE — 65270000029 HC RM PRIVATE

## 2017-09-30 PROCEDURE — 80048 BASIC METABOLIC PNL TOTAL CA: CPT | Performed by: SURGERY

## 2017-09-30 PROCEDURE — 74011000250 HC RX REV CODE- 250: Performed by: SURGERY

## 2017-09-30 PROCEDURE — 74011250636 HC RX REV CODE- 250/636: Performed by: SURGERY

## 2017-09-30 PROCEDURE — 36592 COLLECT BLOOD FROM PICC: CPT

## 2017-09-30 PROCEDURE — 74011000258 HC RX REV CODE- 258: Performed by: SURGERY

## 2017-09-30 PROCEDURE — 74011250637 HC RX REV CODE- 250/637: Performed by: NURSE PRACTITIONER

## 2017-09-30 RX ADMIN — PIPERACILLIN SODIUM,TAZOBACTAM SODIUM 3.38 G: 3; .375 INJECTION, POWDER, FOR SOLUTION INTRAVENOUS at 14:50

## 2017-09-30 RX ADMIN — ENOXAPARIN SODIUM 40 MG: 40 INJECTION SUBCUTANEOUS at 07:27

## 2017-09-30 RX ADMIN — SOYBEAN OIL 250 ML: 20 INJECTION, SOLUTION INTRAVENOUS at 18:52

## 2017-09-30 RX ADMIN — HYDROMORPHONE HYDROCHLORIDE 0.3 MG: 1 INJECTION, SOLUTION INTRAMUSCULAR; INTRAVENOUS; SUBCUTANEOUS at 07:26

## 2017-09-30 RX ADMIN — Medication 600 UNITS: at 22:00

## 2017-09-30 RX ADMIN — SODIUM CHLORIDE 12.5 MG: 9 INJECTION INTRAMUSCULAR; INTRAVENOUS; SUBCUTANEOUS at 21:58

## 2017-09-30 RX ADMIN — ENOXAPARIN SODIUM 40 MG: 40 INJECTION SUBCUTANEOUS at 22:00

## 2017-09-30 RX ADMIN — CITALOPRAM HYDROBROMIDE 10 MG: 20 TABLET ORAL at 09:15

## 2017-09-30 RX ADMIN — Medication 20 ML: at 05:43

## 2017-09-30 RX ADMIN — Medication 20 ML: at 13:25

## 2017-09-30 RX ADMIN — POTASSIUM CHLORIDE: 2 INJECTION, SOLUTION, CONCENTRATE INTRAVENOUS at 18:53

## 2017-09-30 RX ADMIN — HYDROMORPHONE HYDROCHLORIDE 0.3 MG: 1 INJECTION, SOLUTION INTRAMUSCULAR; INTRAVENOUS; SUBCUTANEOUS at 13:23

## 2017-09-30 RX ADMIN — Medication 20 ML: at 21:59

## 2017-09-30 RX ADMIN — Medication 600 UNITS: at 13:25

## 2017-09-30 RX ADMIN — Medication 600 UNITS: at 05:43

## 2017-09-30 RX ADMIN — PIPERACILLIN SODIUM,TAZOBACTAM SODIUM 3.38 G: 3; .375 INJECTION, POWDER, FOR SOLUTION INTRAVENOUS at 23:19

## 2017-09-30 RX ADMIN — PIPERACILLIN SODIUM,TAZOBACTAM SODIUM 3.38 G: 3; .375 INJECTION, POWDER, FOR SOLUTION INTRAVENOUS at 06:32

## 2017-09-30 NOTE — PROGRESS NOTES
Problem: Falls - Risk of  Goal: *Absence of Falls  Document Gia Fall Risk and appropriate interventions in the flowsheet.    Outcome: Progressing Towards Goal  Fall Risk Interventions:  Mobility Interventions: Patient to call before getting OOB           Medication Interventions: Patient to call before getting OOB     Elimination Interventions: Call light in reach

## 2017-09-30 NOTE — PROGRESS NOTES
H&P/Consult Note/Progress Note/Office Note:   Napoleon Arce  MRN: [id number]  :1940  Age:76 y.o.    HPI: Micki Zayas X 42 y. o. female who reports a 1 month h/o RLQ pain.        This was progressively worsening and associated with intermittent N/V. It was moderate in intensity (8/10) leading up to admission. Nothing makes it better or worse. She was treated previously with laxatives without improvement. She came the [facility] ER about 5 days prior to admission and was treated with antibiotics for UTI.  No imaging was done. She came back to the ER on the day of admission and CT scan was obtained as shown below. She is s/p lap francois at [facility] in approx .    She is s/p hysterectomy about 40 yrs ago for benign fibroids. Jeni Mai thinks her ovaries remain. She denies associated bloody BMs or diarrhea.            Results for Earlyne Gu (MRN [id number]) as of 2017 09:35     Ref. Range 2017 16:42   CEA Latest Ref Range: 0.0 - 3.0 ng/mL 0.8   CA-125 Latest Ref Range: 1.5 - 35.0 U/mL 5           17 CT abd/pelvis with contrast with IV but not oral contrast   Minimal linear atelectasis or scarring is present in the lung bases, left greater than right. There is a small to moderate-sized hiatal hernia. No pleural or pericardial fluid.      Small hypoattenuating lesions are present in the right hepatic lobe and left kidney, too small to definitively characterize but statistically likely  representing cysts. There is central biliary ductal ectasia s/p cholecystectomy.      The remaining solid abdominal viscera are unremarkable to the limits of  noncontrast CT. Atherosclerotic abdominal aorta and branch vessels. No upper  abdominal no retroperitoneal lymph node enlargement. Nonopacified small bowel  loops are normal in caliber.      Pelvis:  There is somewhat prominent lipomatosis along the left posterior  pararenal fascia and iliopsoas without associated soft tissue component,  measuring up to 12.0 x 23 x 9 cm. The bladder is predominantly decompressed. Uterus not visualized.      Extensive diverticular changes are present in the colon which is otherwise  normal in caliber. There is a large ill-defined inflammatory process in the  right lower quadrant measuring 5.9 x 10.0 x 6.4 cm approximating the ileocecal  junction from which the appendix is not visualized as a distinctly separate  structure. Multiple high attenuation foci are present within or adjacent to the  bowel, suspicious for medication and/or enteroliths. There is moderate adjacent  inflammatory fat stranding and multiple prominent right lower quadrant  mesenteric lymph nodes, the largest of which measures 1.2 cm in short axis.      Trace dependent free pelvic fluid. No aggressive osseous lesions.      IMPRESSION:  Nonspecific inflammatory process in RLQ at the ileocecal junction. Diff Dx include infectious, and ischemic colitis, appendicitis, inflammatory bowel disease, omental infarct.       Large extraperitoneal predominantly fatty attenuation mass without definite enhancing soft tissue. Size places the patient at risk for sarcoma, the inferior extent of which is not included in the field of view.        9/25/17 CT abd/pelvis with oral and IV contrast   Tiny hypodense lesion near the dome of the liver and another seen within the right posterior lobe. These are too small to further characterize. Otherwise, the liver and spleen enhances homogeneously without discrete lesions. There is no biliary ductal dilatation. Clips are present from prior cholecystectomy. The pancreas and adrenal glands are normal. The kidneys  enhance symmetrically. Bowel loops in the upper abdomen are normal. No definite upper abdominal lymphadenopathy seen.     The RLQ phlegmon seen previously is again noted with significant bowel wall thickening involving the cecum and distal ileum. There is adjacent fat stranding.  The large extraperitoneal fatty mass discussed previously is again noted and is unchanged. The bladder and rectum are normal.  No pelvic adenopathy seen. No free air or free fluid seen within the pelvis.       IMPRESSION: No significant interval change when compared with the prior study.          9/19/17 Abd pain unchanged; Mass-effect in RLQ; PICC requested; on IV ABx; NPO  9/20/17 PICC placed yesterday; on TPN; Nausea persists;  RLQ pain no better since admission and still 8/10  Requests adult diapers secondary to urinary incontinence  9/21/17 says her pain is 8/10 but looks comfortable  9/22/17 pain issues improving  9/23/17 Awake in bed. No new complaints. 9/24/17 Resting in bed. No new complaints. Family at bedside.   9/25/17 Overall improvement in RLQ pain compared to admission  9/26/17 reports RLQ abd pain better since admission  9/27/17 no changes  9/28/17 she feels like RX starting to work with less RLQ pain  9/29/17 slow but steady improvement clinically; still with RLQ pain  9/30/17 RLQ pain better compared to admission          Past Medical History:   Diagnosis Date    Anxiety state, unspecified     GERD (gastroesophageal reflux disease)     Hypercholesterolemia      Past Surgical History:   Procedure Laterality Date    HX CHOLECYSTECTOMY      HX COLONOSCOPY      Dr Lizet Velez 2012, repeat 5 years    HX GYN      hysterectomy, partial    HX KNEE REPLACEMENT      right     Current Facility-Administered Medications   Medication Dose Route Frequency    ondansetron (ZOFRAN ODT) tablet 4 mg  4 mg Oral Q6H PRN    citalopram (CELEXA) tablet 10 mg  10 mg Oral DAILY    enoxaparin (LOVENOX) injection 40 mg  40 mg SubCUTAneous Q12H    TPN ADULT - dextrose 10% amino acid 4.25%   IntraVENous QPM    ondansetron (ZOFRAN) injection 4 mg  4 mg IntraVENous Q4H PRN    promethazine (PHENERGAN) with saline injection 12.5 mg  12.5 mg IntraVENous Q4H PRN    HYDROmorphone (PF) (DILAUDID) injection 0.2-0.3 mg  0.2-0.3 mg IntraVENous Q4H PRN    NUTRITIONAL SUPPORT ELECTROLYTE PRN ORDERS   Does Not Apply PRN    fat emulsion 20% (LIPOSYN, INTRALIPID) infusion 250 mL  250 mL IntraVENous QPM    sodium chloride (NS) flush 20 mL  20 mL InterCATHeter Q8H    heparin (porcine) pf 600 Units  600 Units InterCATHeter Q8H    sodium chloride (NS) flush 20 mL  20 mL InterCATHeter PRN    heparin (porcine) pf 600 Units  600 Units InterCATHeter PRN    acetaminophen (TYLENOL) tablet 1,000 mg  1,000 mg Oral Q6H PRN    oxyCODONE IR (ROXICODONE) tablet 5 mg  5 mg Oral Q4H PRN    Or    oxyCODONE IR (ROXICODONE) tablet 10 mg  10 mg Oral Q4H PRN    piperacillin-tazobactam (ZOSYN) 3.375 g in 0.9% sodium chloride (MBP/ADV) 100 mL  3.375 g IntraVENous Q8H     Morphine  Social History     Social History    Marital status:      Spouse name: N/A    Number of children: N/A    Years of education: N/A     Social History Main Topics    Smoking status: Former Smoker     Quit date: 1/1/1975    Smokeless tobacco: Never Used    Alcohol use No    Drug use: No    Sexual activity: Not on file     Other Topics Concern    Not on file     Social History Narrative     History   Smoking Status    Former Smoker    Quit date: 1/1/1975   Smokeless Tobacco    Never Used     Family History   Problem Relation Age of Onset    Diabetes Mother     Heart Disease Mother     Thyroid Disease Mother     Heart Disease Father      ROS:  Comprehensive review of systems was otherwise unremarkable except as noted above. Physical Exam:   Visit Vitals    /80    Pulse 64    Temp 98.8 °F (37.1 °C)    Resp 18    Ht 5' 4\" (1.626 m)    Wt 106.1 kg (234 lb)    SpO2 94%    BMI 40.17 kg/m2     Constitutional: Alert, oriented, cooperative patient in no acute distress; appears stated age    Eyes:Sclera are clear. EOMs intact  ENMT: no external lesions gross hearing normal; no obvious neck masses, no ear or lip lesions, nares normal  CV: RRR.  Normal perfusion  Resp: No JVD.  Breathing is  non-labored; no audible wheezing.    GI: obese; less firmness/fullness/mass-effect in RLQ; Softer than at admission; tender to much deeper palpation; no guarding    Musculoskeletal: unremarkable with normal function. No embolic signs or cyanosis. Neuro:  Oriented; moves all 4; no focal deficits  Psychiatric: normal affect and mood, no memory impairment    Recent vitals (if inpt):  Patient Vitals for the past 24 hrs:   BP Temp Pulse Resp SpO2 Weight   09/30/17 0523 - - - - - 106.1 kg (234 lb)   09/30/17 0346 151/80 98.8 °F (37.1 °C) 64 18 94 % -   09/30/17 0052 108/70 98.6 °F (37 °C) 62 18 94 % -   09/29/17 2058 151/82 98.9 °F (37.2 °C) 68 18 98 % -   09/29/17 1538 136/69 98.5 °F (36.9 °C) 60 18 98 % -   09/29/17 1122 133/61 97.2 °F (36.2 °C) 69 16 97 % -   09/29/17 0739 121/80 97.9 °F (36.6 °C) 66 18 97 % -       Labs:  Recent Labs      09/30/17   0449   WBC  5.9   HGB  9.9*   PLT  289   NA  141   K  4.2   CL  108*   CO2  28   BUN  15   CREA  0.60   GLU  89       Lab Results   Component Value Date/Time    WBC 5.9 09/30/2017 04:49 AM    HGB 9.9 09/30/2017 04:49 AM    PLATELET 323 09/87/0436 04:49 AM    Sodium 141 09/30/2017 04:49 AM    Potassium 4.2 09/30/2017 04:49 AM    Chloride 108 09/30/2017 04:49 AM    CO2 28 09/30/2017 04:49 AM    BUN 15 09/30/2017 04:49 AM    Creatinine 0.60 09/30/2017 04:49 AM    Glucose 89 09/30/2017 04:49 AM    Bilirubin, total 0.2 09/25/2017 04:43 AM    AST (SGOT) 34 09/25/2017 04:43 AM    ALT (SGPT) 38 09/25/2017 04:43 AM    Alk. phosphatase 92 09/25/2017 04:43 AM       I reviewed recent labs and recent radiologic studies. I independently reviewed radiology images for studies I described above or studies I have ordered.    Admission date (for inpatients): 9/18/2017   * No surgery found *  Procedure(s):  LAPAROTOMY EXPLORATORY POSS BOWEL RESECTION/ 236    ASSESSMENT/PLAN:  Problem List  Date Reviewed: 9/5/2017          Codes Class Noted    Massive Left Retroperitoneal fatty mass seen on CT scan extending from kidney to groin ICD-10-CM: R19.00  ICD-9-CM: 789.30  9/22/2017        Acute cystitis ICD-10-CM: N30.00  ICD-9-CM: 595.0  9/19/2017        RLQ abdominal mass ICD-10-CM: R19.03  ICD-9-CM: 789.33  9/19/2017        RLQ abdominal pain ICD-10-CM: R10.31  ICD-9-CM: 789.03  9/19/2017        Leukocytosis ICD-10-CM: G62.716  ICD-9-CM: 288.60  9/19/2017        Non-intractable vomiting with nausea ICD-10-CM: R11.2  ICD-9-CM: 787.01  9/19/2017        * (Principal)Intra-abdominal infection ICD-10-CM: B99.9  ICD-9-CM: 136.9  9/18/2017        Anxiety state, unspecified ICD-10-CM: F41.1  ICD-9-CM: 300.00  Unknown        GERD (gastroesophageal reflux disease) ICD-10-CM: K21.9  ICD-9-CM: 530.81  Unknown        Hypercholesterolemia ICD-10-CM: E78.00  ICD-9-CM: 272.0  Unknown            Principal Problem:    Intra-abdominal infection (9/18/2017)    Active Problems:    Acute cystitis (9/19/2017)      RLQ abdominal mass (9/19/2017)      RLQ abdominal pain (9/19/2017)      Leukocytosis (9/19/2017)      Non-intractable vomiting with nausea (9/19/2017)      Massive Left Retroperitoneal fatty mass seen on CT scan extending from kidney to groin (9/22/2017)       RLQ phlegmon involving bowel with RLQ pain, N/V and delayed presentation and 1 month prodrome  Starting to see some signs of progress on exam   NPO/Bowel rest, TPN via PICC  IV Zosyn   Repeat CT Monday  OR for expl laparotomy Tuesday if no better; she is on OR schedule        Differential Dx  Ruptured (delayed presentation) appendictis with RLQ phelgmon  Malignancy (carcinoma, sarcoma, ovarian carcinoma)  Epiploicae Appendagitis  IBD  Diverticulitis  Ischemic Enteritis  Infectious Enteritis      In addition, there is also a left retroperitoneal infiltrative fatty mass-->rule out liposarcoma  Further workup for this on hold until we get resolution/stability of the RLQ inflammatory process  CEA and  were normal          Adult diaper for urinary incontinence - Pt couldn't make it to [facility] and wet bed  IV narcotic prn pain  IV Zofran/phenergan for nausea prn  GI (pepcid- add to TPN) and VTE (lovenox) prophylaxis ordered

## 2017-09-30 NOTE — PROGRESS NOTES
Problem: Nutrition Deficit  Goal: *Optimize nutritional status  Nutrition F/U: TPN Management- Reviewed, no change to TPN at this time.       Sierra Ervin Quir[address], [address] [place] [address], [phone]

## 2017-09-30 NOTE — PROGRESS NOTES
END OF SHIFT NOTE:    INTAKE/OUTPUT  09/29 0701 - 09/30 0700  In: 2582 [I.V.:2582]  Out: 1500 [Urine:1500]  Voiding: YES  Catheter: NO  Drain:              Flatus: Patient does have flatus present. Stool:  0 occurrences. Characteristics:  Stool Assessment  Stool Color: Brown  Stool Appearance: Other (comment) (ribbon like)  Stool Amount: Small  Stool Source/Status: Rectum    Emesis: 0 occurrences. Characteristics:        VITAL SIGNS  Patient Vitals for the past 12 hrs:   Temp Pulse Resp BP SpO2   09/30/17 0346 98.8 °F (37.1 °C) 64 18 151/80 94 %   09/30/17 0052 98.6 °F (37 °C) 62 18 108/70 94 %   09/29/17 2058 98.9 °F (37.2 °C) 68 18 151/82 98 %       Pain Assessment  Pain Intensity 1: 0 (09/30/17 0346)  Pain Location 1: Abdomen  Pain Intervention(s) 1: Medication (see MAR)  Patient Stated Pain Goal: 0    Ambulating  Yes    Shift report given to oncoming nurse at the bedside.     Michael Fraser RN

## 2017-10-01 LAB
ANION GAP SERPL CALC-SCNC: 7 MMOL/L (ref 7–16)
BUN SERPL-MCNC: 14 MG/DL (ref 8–23)
CALCIUM SERPL-MCNC: 8.7 MG/DL (ref 8.3–10.4)
CHLORIDE SERPL-SCNC: 107 MMOL/L (ref 98–107)
CO2 SERPL-SCNC: 28 MMOL/L (ref 21–32)
CREAT SERPL-MCNC: 0.61 MG/DL (ref 0.6–1)
ERYTHROCYTE [DISTWIDTH] IN BLOOD BY AUTOMATED COUNT: 16.9 % (ref 11.9–14.6)
GLUCOSE SERPL-MCNC: 93 MG/DL (ref 65–100)
HCT VFR BLD AUTO: 33.6 % (ref 35.8–46.3)
HGB BLD-MCNC: 10.3 G/DL (ref 11.7–15.4)
MCH RBC QN AUTO: 24.9 PG (ref 26.1–32.9)
MCHC RBC AUTO-ENTMCNC: 30.7 G/DL (ref 31.4–35)
MCV RBC AUTO: 81.4 FL (ref 79.6–97.8)
PLATELET # BLD AUTO: 310 K/UL (ref 150–450)
PMV BLD AUTO: 10.3 FL (ref 10.8–14.1)
POTASSIUM SERPL-SCNC: 4 MMOL/L (ref 3.5–5.1)
RBC # BLD AUTO: 4.13 M/UL (ref 4.05–5.25)
SODIUM SERPL-SCNC: 142 MMOL/L (ref 136–145)
WBC # BLD AUTO: 5.6 K/UL (ref 4.3–11.1)

## 2017-10-01 PROCEDURE — 74011250637 HC RX REV CODE- 250/637: Performed by: SURGERY

## 2017-10-01 PROCEDURE — 74011000258 HC RX REV CODE- 258: Performed by: SURGERY

## 2017-10-01 PROCEDURE — 65270000029 HC RM PRIVATE

## 2017-10-01 PROCEDURE — 74011250637 HC RX REV CODE- 250/637: Performed by: NURSE PRACTITIONER

## 2017-10-01 PROCEDURE — 85027 COMPLETE CBC AUTOMATED: CPT | Performed by: SURGERY

## 2017-10-01 PROCEDURE — 80048 BASIC METABOLIC PNL TOTAL CA: CPT | Performed by: SURGERY

## 2017-10-01 PROCEDURE — 74011250636 HC RX REV CODE- 250/636: Performed by: SURGERY

## 2017-10-01 PROCEDURE — 74011000250 HC RX REV CODE- 250: Performed by: SURGERY

## 2017-10-01 RX ADMIN — ENOXAPARIN SODIUM 40 MG: 40 INJECTION SUBCUTANEOUS at 21:16

## 2017-10-01 RX ADMIN — ENOXAPARIN SODIUM 40 MG: 40 INJECTION SUBCUTANEOUS at 09:09

## 2017-10-01 RX ADMIN — HYDROMORPHONE HYDROCHLORIDE 0.3 MG: 1 INJECTION, SOLUTION INTRAMUSCULAR; INTRAVENOUS; SUBCUTANEOUS at 18:47

## 2017-10-01 RX ADMIN — SOYBEAN OIL 250 ML: 20 INJECTION, SOLUTION INTRAVENOUS at 18:33

## 2017-10-01 RX ADMIN — SODIUM CHLORIDE 12.5 MG: 9 INJECTION INTRAMUSCULAR; INTRAVENOUS; SUBCUTANEOUS at 22:03

## 2017-10-01 RX ADMIN — Medication 600 UNITS: at 14:27

## 2017-10-01 RX ADMIN — SODIUM CHLORIDE 12.5 MG: 9 INJECTION INTRAMUSCULAR; INTRAVENOUS; SUBCUTANEOUS at 06:02

## 2017-10-01 RX ADMIN — Medication 600 UNITS: at 05:45

## 2017-10-01 RX ADMIN — CITALOPRAM HYDROBROMIDE 10 MG: 20 TABLET ORAL at 09:10

## 2017-10-01 RX ADMIN — ONDANSETRON 4 MG: 2 INJECTION INTRAMUSCULAR; INTRAVENOUS at 12:05

## 2017-10-01 RX ADMIN — PIPERACILLIN SODIUM,TAZOBACTAM SODIUM 3.38 G: 3; .375 INJECTION, POWDER, FOR SOLUTION INTRAVENOUS at 23:06

## 2017-10-01 RX ADMIN — HYDROMORPHONE HYDROCHLORIDE 0.3 MG: 1 INJECTION, SOLUTION INTRAMUSCULAR; INTRAVENOUS; SUBCUTANEOUS at 23:10

## 2017-10-01 RX ADMIN — PIPERACILLIN SODIUM,TAZOBACTAM SODIUM 3.38 G: 3; .375 INJECTION, POWDER, FOR SOLUTION INTRAVENOUS at 06:02

## 2017-10-01 RX ADMIN — POTASSIUM CHLORIDE: 2 INJECTION, SOLUTION, CONCENTRATE INTRAVENOUS at 18:34

## 2017-10-01 RX ADMIN — Medication 20 ML: at 21:17

## 2017-10-01 RX ADMIN — ONDANSETRON 4 MG: 2 INJECTION INTRAMUSCULAR; INTRAVENOUS at 18:51

## 2017-10-01 RX ADMIN — PIPERACILLIN SODIUM,TAZOBACTAM SODIUM 3.38 G: 3; .375 INJECTION, POWDER, FOR SOLUTION INTRAVENOUS at 14:29

## 2017-10-01 RX ADMIN — Medication 20 ML: at 14:28

## 2017-10-01 RX ADMIN — OXYCODONE HYDROCHLORIDE 5 MG: 5 TABLET ORAL at 09:18

## 2017-10-01 RX ADMIN — Medication 600 UNITS: at 21:16

## 2017-10-01 RX ADMIN — Medication 20 ML: at 05:46

## 2017-10-01 NOTE — PROGRESS NOTES
END OF SHIFT NOTE:    INTAKE/OUTPUT  09/30 0701 - 10/01 0700  In: 1983 [I.V.:1983]  Out: 1950 [Urine:1950]  Voiding: YES  Catheter: NO  Drain:              Flatus: Patient does not have flatus present. Stool:  0 occurrences. Characteristics:  Stool Assessment  Stool Color: Tan (Comment)  Stool Appearance: Loose  Stool Amount: Medium  Stool Source/Status: Rectum    Emesis: 0 occurrences. Characteristics:        VITAL SIGNS  Patient Vitals for the past 12 hrs:   Temp Pulse Resp BP SpO2   10/01/17 0308 98.4 °F (36.9 °C) 68 17 153/84 98 %   09/30/17 2300 98.3 °F (36.8 °C) 67 17 143/62 96 %   09/30/17 1917 98 °F (36.7 °C) 66 18 147/72 95 %       Pain Assessment  Pain Intensity 1: 0 (10/01/17 0100)  Pain Location 1: Abdomen  Pain Intervention(s) 1: Family Support  Patient Stated Pain Goal: 0    Ambulating  Yes    Shift report given to oncoming nurse at the bedside.     Annette Padron RN

## 2017-10-01 NOTE — PROGRESS NOTES
H&P/Consult Note/Progress Note/Office Note:   Nena Daniel  MRN: [id number]  :1940  Age:76 y.o.    HPI: Perdo Luis Rings P 88 y. o. female who reports a 1 month h/o RLQ pain.        This was progressively worsening and associated with intermittent N/V. It was moderate in intensity (8/10) leading up to admission. Nothing makes it better or worse. She was treated previously with laxatives without improvement. She came the [facility] ER about 5 days prior to admission and was treated with antibiotics for UTI.  No imaging was done. She came back to the ER on the day of admission and CT scan was obtained as shown below. She is s/p lap francois at [facility] in approx .    She is s/p hysterectomy about 40 yrs ago for benign fibroids. Ruby Bloch thinks her ovaries remain. She denies associated bloody BMs or diarrhea.            Results for Pilar Luna (MRN [id number]) as of 2017 09:35     Ref. Range 2017 16:42   CEA Latest Ref Range: 0.0 - 3.0 ng/mL 0.8   CA-125 Latest Ref Range: 1.5 - 35.0 U/mL 5           17 CT abd/pelvis with contrast with IV but not oral contrast   Minimal linear atelectasis or scarring is present in the lung bases, left greater than right. There is a small to moderate-sized hiatal hernia. No pleural or pericardial fluid.      Small hypoattenuating lesions are present in the right hepatic lobe and left kidney, too small to definitively characterize but statistically likely  representing cysts. There is central biliary ductal ectasia s/p cholecystectomy.      The remaining solid abdominal viscera are unremarkable to the limits of  noncontrast CT. Atherosclerotic abdominal aorta and branch vessels. No upper  abdominal no retroperitoneal lymph node enlargement. Nonopacified small bowel  loops are normal in caliber.      Pelvis:  There is somewhat prominent lipomatosis along the left posterior  pararenal fascia and iliopsoas without associated soft tissue component,  measuring up to 12.0 x 23 x 9 cm. The bladder is predominantly decompressed. Uterus not visualized.      Extensive diverticular changes are present in the colon which is otherwise  normal in caliber. There is a large ill-defined inflammatory process in the  right lower quadrant measuring 5.9 x 10.0 x 6.4 cm approximating the ileocecal  junction from which the appendix is not visualized as a distinctly separate  structure. Multiple high attenuation foci are present within or adjacent to the  bowel, suspicious for medication and/or enteroliths. There is moderate adjacent  inflammatory fat stranding and multiple prominent right lower quadrant  mesenteric lymph nodes, the largest of which measures 1.2 cm in short axis.      Trace dependent free pelvic fluid. No aggressive osseous lesions.      IMPRESSION:  Nonspecific inflammatory process in RLQ at the ileocecal junction. Diff Dx include infectious, and ischemic colitis, appendicitis, inflammatory bowel disease, omental infarct.       Large extraperitoneal predominantly fatty attenuation mass without definite enhancing soft tissue. Size places the patient at risk for sarcoma, the inferior extent of which is not included in the field of view.        9/25/17 CT abd/pelvis with oral and IV contrast   Tiny hypodense lesion near the dome of the liver and another seen within the right posterior lobe. These are too small to further characterize. Otherwise, the liver and spleen enhances homogeneously without discrete lesions. There is no biliary ductal dilatation. Clips are present from prior cholecystectomy. The pancreas and adrenal glands are normal. The kidneys  enhance symmetrically. Bowel loops in the upper abdomen are normal. No definite upper abdominal lymphadenopathy seen.     The RLQ phlegmon seen previously is again noted with significant bowel wall thickening involving the cecum and distal ileum. There is adjacent fat stranding.  The large extraperitoneal fatty mass discussed previously is again noted and is unchanged. The bladder and rectum are normal.  No pelvic adenopathy seen. No free air or free fluid seen within the pelvis.       IMPRESSION: No significant interval change when compared with the prior study.          9/19/17 Abd pain unchanged; Mass-effect in RLQ; PICC requested; on IV ABx; NPO  9/20/17 PICC placed yesterday; on TPN; Nausea persists;  RLQ pain no better since admission and still 8/10  Requests adult diapers secondary to urinary incontinence  9/21/17 says her pain is 8/10 but looks comfortable  9/22/17 pain issues improving  9/23/17 Awake in bed. No new complaints. 9/24/17 Resting in bed. No new complaints. Family at bedside.   9/25/17 Overall improvement in RLQ pain compared to admission  9/26/17 reports RLQ abd pain better since admission  9/27/17 no changes  9/28/17 she feels like RX starting to work with less RLQ pain  9/29/17 slow but steady improvement clinically; still with RLQ pain  9/30/17 RLQ pain better compared to admission  10/1/17 she reports pain 4/10 for only about 5hrs total during the day compared to 8/10 pain 24hrs a day at admission          Past Medical History:   Diagnosis Date    Anxiety state, unspecified     GERD (gastroesophageal reflux disease)     Hypercholesterolemia      Past Surgical History:   Procedure Laterality Date    HX CHOLECYSTECTOMY      HX COLONOSCOPY      Dr Sheeba Marshall 2012, repeat 5 years    HX GYN      hysterectomy, partial    HX KNEE REPLACEMENT      right     Current Facility-Administered Medications   Medication Dose Route Frequency    ondansetron (ZOFRAN ODT) tablet 4 mg  4 mg Oral Q6H PRN    citalopram (CELEXA) tablet 10 mg  10 mg Oral DAILY    enoxaparin (LOVENOX) injection 40 mg  40 mg SubCUTAneous Q12H    TPN ADULT - dextrose 10% amino acid 4.25%   IntraVENous QPM    ondansetron (ZOFRAN) injection 4 mg  4 mg IntraVENous Q4H PRN    promethazine (PHENERGAN) with saline injection 12.5 mg  12.5 mg IntraVENous Q4H PRN    HYDROmorphone (PF) (DILAUDID) injection 0.2-0.3 mg  0.2-0.3 mg IntraVENous Q4H PRN    NUTRITIONAL SUPPORT ELECTROLYTE PRN ORDERS   Does Not Apply PRN    fat emulsion 20% (LIPOSYN, INTRALIPID) infusion 250 mL  250 mL IntraVENous QPM    sodium chloride (NS) flush 20 mL  20 mL InterCATHeter Q8H    heparin (porcine) pf 600 Units  600 Units InterCATHeter Q8H    sodium chloride (NS) flush 20 mL  20 mL InterCATHeter PRN    heparin (porcine) pf 600 Units  600 Units InterCATHeter PRN    acetaminophen (TYLENOL) tablet 1,000 mg  1,000 mg Oral Q6H PRN    oxyCODONE IR (ROXICODONE) tablet 5 mg  5 mg Oral Q4H PRN    Or    oxyCODONE IR (ROXICODONE) tablet 10 mg  10 mg Oral Q4H PRN    piperacillin-tazobactam (ZOSYN) 3.375 g in 0.9% sodium chloride (MBP/ADV) 100 mL  3.375 g IntraVENous Q8H     Morphine  Social History     Social History    Marital status:      Spouse name: N/A    Number of children: N/A    Years of education: N/A     Social History Main Topics    Smoking status: Former Smoker     Quit date: 1/1/1975    Smokeless tobacco: Never Used    Alcohol use No    Drug use: No    Sexual activity: Not on file     Other Topics Concern    Not on file     Social History Narrative     History   Smoking Status    Former Smoker    Quit date: 1/1/1975   Smokeless Tobacco    Never Used     Family History   Problem Relation Age of Onset    Diabetes Mother     Heart Disease Mother     Thyroid Disease Mother     Heart Disease Father      ROS:  Comprehensive review of systems was otherwise unremarkable except as noted above. Physical Exam:   Visit Vitals    /72    Pulse 69    Temp 97.7 °F (36.5 °C)    Resp 16    Ht 5' 4\" (1.626 m)    Wt 234 lb 3.2 oz (106.2 kg)    SpO2 95%    BMI 40.2 kg/m2     Constitutional: Alert, oriented, cooperative patient in no acute distress; appears stated age    Eyes:Sclera are clear.  EOMs intact  ENMT: no external lesions gross hearing normal; no obvious neck masses, no ear or lip lesions, nares normal  CV: RRR. Normal perfusion  Resp: No JVD.  Breathing is  non-labored; no audible wheezing.    GI: obese; less firmness/fullness/mass-effect in RLQ; Softer than at admission; tender to much deeper palpation; no guarding    Musculoskeletal: unremarkable with normal function. No embolic signs or cyanosis. Neuro:  Oriented; moves all 4; no focal deficits  Psychiatric: normal affect and mood, no memory impairment    Recent vitals (if inpt):  Patient Vitals for the past 24 hrs:   BP Temp Pulse Resp SpO2 Weight   10/01/17 0712 138/72 97.7 °F (36.5 °C) 69 16 95 % -   10/01/17 0308 153/84 98.4 °F (36.9 °C) 68 17 98 % -   10/01/17 0220 - - - - - 234 lb 3.2 oz (106.2 kg)   09/30/17 2300 143/62 98.3 °F (36.8 °C) 67 17 96 % -   09/30/17 1917 147/72 98 °F (36.7 °C) 66 18 95 % -   09/30/17 1549 123/57 98 °F (36.7 °C) 64 16 98 % -       Labs:  Recent Labs      10/01/17   0547   WBC  5.6   HGB  10.3*   PLT  310   NA  142   K  4.0   CL  107   CO2  28   BUN  14   CREA  0.61   GLU  93       Lab Results   Component Value Date/Time    WBC 5.6 10/01/2017 05:47 AM    HGB 10.3 10/01/2017 05:47 AM    PLATELET 023 34/94/7045 05:47 AM    Sodium 142 10/01/2017 05:47 AM    Potassium 4.0 10/01/2017 05:47 AM    Chloride 107 10/01/2017 05:47 AM    CO2 28 10/01/2017 05:47 AM    BUN 14 10/01/2017 05:47 AM    Creatinine 0.61 10/01/2017 05:47 AM    Glucose 93 10/01/2017 05:47 AM    Bilirubin, total 0.2 09/25/2017 04:43 AM    AST (SGOT) 34 09/25/2017 04:43 AM    ALT (SGPT) 38 09/25/2017 04:43 AM    Alk. phosphatase 92 09/25/2017 04:43 AM       I reviewed recent labs and recent radiologic studies. I independently reviewed radiology images for studies I described above or studies I have ordered.    Admission date (for inpatients): 9/18/2017   * No surgery found *  Procedure(s):  LAPAROTOMY EXPLORATORY POSS BOWEL RESECTION/ 236    ASSESSMENT/PLAN:  Problem List  Date Reviewed: 9/5/2017 Codes Class Noted    Massive Left Retroperitoneal fatty mass seen on CT scan extending from kidney to groin ICD-10-CM: R19.00  ICD-9-CM: 789.30  9/22/2017        Acute cystitis ICD-10-CM: N30.00  ICD-9-CM: 595.0  9/19/2017        RLQ abdominal mass ICD-10-CM: R19.03  ICD-9-CM: 789.33  9/19/2017        RLQ abdominal pain ICD-10-CM: R10.31  ICD-9-CM: 789.03  9/19/2017        Leukocytosis ICD-10-CM: Z79.210  ICD-9-CM: 288.60  9/19/2017        Non-intractable vomiting with nausea ICD-10-CM: R11.2  ICD-9-CM: 787.01  9/19/2017        * (Principal)Intra-abdominal infection ICD-10-CM: B99.9  ICD-9-CM: 136.9  9/18/2017        Anxiety state, unspecified ICD-10-CM: F41.1  ICD-9-CM: 300.00  Unknown        GERD (gastroesophageal reflux disease) ICD-10-CM: K21.9  ICD-9-CM: 530.81  Unknown        Hypercholesterolemia ICD-10-CM: E78.00  ICD-9-CM: 272.0  Unknown            Principal Problem:    Intra-abdominal infection (9/18/2017)    Active Problems:    Acute cystitis (9/19/2017)      RLQ abdominal mass (9/19/2017)      RLQ abdominal pain (9/19/2017)      Leukocytosis (9/19/2017)      Non-intractable vomiting with nausea (9/19/2017)      Massive Left Retroperitoneal fatty mass seen on CT scan extending from kidney to groin (9/22/2017)       RLQ phlegmon involving bowel with RLQ pain, N/V and delayed presentation and 1 month prodrome  Starting to see some signs of progress on exam   NPO/Bowel rest, TPN via PICC  IV Zosyn   Repeat CT in am  OR for expl laparotomy Tuesday if no better; she is on OR schedule        Differential Dx  Ruptured (delayed presentation) appendictis with RLQ phelgmon  Malignancy (carcinoma, sarcoma, ovarian carcinoma)  Epiploicae Appendagitis  IBD  Diverticulitis  Ischemic Enteritis  Infectious Enteritis      In addition, there is also a left retroperitoneal infiltrative fatty mass-->rule out liposarcoma  Further workup for this on hold until we get resolution/stability of the RLQ inflammatory process  CEA and  were normal          Adult diaper for urinary incontinence - Pt couldn't make it to [facility] and wet bed  IV narcotic prn pain  IV Zofran/phenergan for nausea prn  GI (pepcid- add to TPN) and VTE (lovenox) prophylaxis ordered

## 2017-10-01 NOTE — PROGRESS NOTES
END OF SHIFT NOTE:    INTAKE/OUTPUT  09/29 0701 - 09/30 0700  In: 2582 [I.V.:2582]  Out: 1750 [Urine:1750]  Voiding: YES  Catheter: NO  Drain:              Flatus: Patient does have flatus present. Stool:  2 occurrences. Characteristics:  Stool Assessment  Stool Color: Tan (Comment)  Stool Appearance: Loose  Stool Amount: Medium  Stool Source/Status: Rectum    Emesis: 0 occurrences. Characteristics:        VITAL SIGNS  Patient Vitals for the past 12 hrs:   Temp Pulse Resp BP SpO2   09/30/17 1917 98 °F (36.7 °C) 66 18 147/72 95 %   09/30/17 1549 98 °F (36.7 °C) 64 16 123/57 98 %       Pain Assessment  Pain Intensity 1: 8 (09/30/17 1323)  Pain Location 1: Abdomen  Pain Intervention(s) 1: Medication (see MAR)  Patient Stated Pain Goal: 0    Ambulating  Yes    Shift report given to oncoming nurse at the bedside.     James Florian RN

## 2017-10-01 NOTE — PROGRESS NOTES
Problem: Nutrition Deficit  Goal: *Optimize nutritional status  Nutrition F/U: TPN Management- Reviewed, no change to TPN at this time.        [address], [address] [place] [address], [address] [place] [address], [phone]

## 2017-10-01 NOTE — PROGRESS NOTES
I have personally reviewed the patients controlled substance prescription history, as maintained in the [place] prescription monitoring program, or I have consulted with a designee regarding the review of the patient's controlled substance history, so that the prescription(s) for a  controlled substance can be given. The exception to this would be if I have written for no more than a 5 day supply of a schedule 2 medication, which is exempt from the [place] requirement.

## 2017-10-02 ENCOUNTER — APPOINTMENT (OUTPATIENT)
Dept: CT IMAGING | Age: 77
DRG: 330 | End: 2017-10-02
Attending: SURGERY
Payer: MEDICARE

## 2017-10-02 ENCOUNTER — ANESTHESIA EVENT (OUTPATIENT)
Dept: SURGERY | Age: 77
DRG: 330 | End: 2017-10-02
Payer: MEDICARE

## 2017-10-02 LAB
ABO + RH BLD: NORMAL
ANION GAP SERPL CALC-SCNC: 5 MMOL/L (ref 7–16)
BLOOD GROUP ANTIBODIES SERPL: NORMAL
BUN SERPL-MCNC: 17 MG/DL (ref 8–23)
CALCIUM SERPL-MCNC: 8.4 MG/DL (ref 8.3–10.4)
CHLORIDE SERPL-SCNC: 109 MMOL/L (ref 98–107)
CO2 SERPL-SCNC: 26 MMOL/L (ref 21–32)
CREAT SERPL-MCNC: 0.6 MG/DL (ref 0.6–1)
GLUCOSE SERPL-MCNC: 101 MG/DL (ref 65–100)
MAGNESIUM SERPL-MCNC: 1.9 MG/DL (ref 1.8–2.4)
PHOSPHATE SERPL-MCNC: 3.9 MG/DL (ref 2.3–3.7)
POTASSIUM SERPL-SCNC: 4.1 MMOL/L (ref 3.5–5.1)
SODIUM SERPL-SCNC: 140 MMOL/L (ref 136–145)
SPECIMEN EXP DATE BLD: NORMAL

## 2017-10-02 PROCEDURE — 36415 COLL VENOUS BLD VENIPUNCTURE: CPT | Performed by: SURGERY

## 2017-10-02 PROCEDURE — 74011636320 HC RX REV CODE- 636/320: Performed by: SURGERY

## 2017-10-02 PROCEDURE — 84100 ASSAY OF PHOSPHORUS: CPT | Performed by: SURGERY

## 2017-10-02 PROCEDURE — 86900 BLOOD TYPING SEROLOGIC ABO: CPT | Performed by: SURGERY

## 2017-10-02 PROCEDURE — 83735 ASSAY OF MAGNESIUM: CPT | Performed by: SURGERY

## 2017-10-02 PROCEDURE — 80048 BASIC METABOLIC PNL TOTAL CA: CPT | Performed by: SURGERY

## 2017-10-02 PROCEDURE — 74011250636 HC RX REV CODE- 250/636: Performed by: SURGERY

## 2017-10-02 PROCEDURE — 74011250637 HC RX REV CODE- 250/637: Performed by: NURSE PRACTITIONER

## 2017-10-02 PROCEDURE — 74177 CT ABD & PELVIS W/CONTRAST: CPT

## 2017-10-02 PROCEDURE — 65270000029 HC RM PRIVATE

## 2017-10-02 PROCEDURE — 74011000258 HC RX REV CODE- 258: Performed by: SURGERY

## 2017-10-02 PROCEDURE — 74011250637 HC RX REV CODE- 250/637: Performed by: SURGERY

## 2017-10-02 PROCEDURE — 88305 TISSUE EXAM BY PATHOLOGIST: CPT | Performed by: SURGERY

## 2017-10-02 PROCEDURE — 74011000250 HC RX REV CODE- 250: Performed by: SURGERY

## 2017-10-02 PROCEDURE — 88307 TISSUE EXAM BY PATHOLOGIST: CPT | Performed by: SURGERY

## 2017-10-02 RX ORDER — SODIUM CHLORIDE 0.9 % (FLUSH) 0.9 %
10 SYRINGE (ML) INJECTION
Status: COMPLETED | OUTPATIENT
Start: 2017-10-02 | End: 2017-10-02

## 2017-10-02 RX ADMIN — HYDROMORPHONE HYDROCHLORIDE 0.3 MG: 1 INJECTION, SOLUTION INTRAMUSCULAR; INTRAVENOUS; SUBCUTANEOUS at 16:29

## 2017-10-02 RX ADMIN — CITALOPRAM HYDROBROMIDE 10 MG: 20 TABLET ORAL at 07:41

## 2017-10-02 RX ADMIN — HYDROMORPHONE HYDROCHLORIDE 0.3 MG: 1 INJECTION, SOLUTION INTRAMUSCULAR; INTRAVENOUS; SUBCUTANEOUS at 22:14

## 2017-10-02 RX ADMIN — ENOXAPARIN SODIUM 40 MG: 40 INJECTION SUBCUTANEOUS at 07:41

## 2017-10-02 RX ADMIN — ENOXAPARIN SODIUM 40 MG: 40 INJECTION SUBCUTANEOUS at 20:25

## 2017-10-02 RX ADMIN — POTASSIUM CHLORIDE: 2 INJECTION, SOLUTION, CONCENTRATE INTRAVENOUS at 18:40

## 2017-10-02 RX ADMIN — Medication 600 UNITS: at 05:09

## 2017-10-02 RX ADMIN — PIPERACILLIN SODIUM,TAZOBACTAM SODIUM 3.38 G: 3; .375 INJECTION, POWDER, FOR SOLUTION INTRAVENOUS at 22:05

## 2017-10-02 RX ADMIN — Medication 10 ML: at 08:32

## 2017-10-02 RX ADMIN — DIATRIZOATE MEGLUMINE AND DIATRIZOATE SODIUM 15 ML: 600; 100 SOLUTION ORAL; RECTAL at 06:30

## 2017-10-02 RX ADMIN — IOPAMIDOL 100 ML: 755 INJECTION, SOLUTION INTRAVENOUS at 08:32

## 2017-10-02 RX ADMIN — PIPERACILLIN SODIUM,TAZOBACTAM SODIUM 3.38 G: 3; .375 INJECTION, POWDER, FOR SOLUTION INTRAVENOUS at 14:50

## 2017-10-02 RX ADMIN — SODIUM CHLORIDE 12.5 MG: 9 INJECTION INTRAMUSCULAR; INTRAVENOUS; SUBCUTANEOUS at 09:39

## 2017-10-02 RX ADMIN — Medication 600 UNITS: at 13:48

## 2017-10-02 RX ADMIN — PIPERACILLIN SODIUM,TAZOBACTAM SODIUM 3.38 G: 3; .375 INJECTION, POWDER, FOR SOLUTION INTRAVENOUS at 06:30

## 2017-10-02 RX ADMIN — HYDROMORPHONE HYDROCHLORIDE 0.3 MG: 1 INJECTION, SOLUTION INTRAMUSCULAR; INTRAVENOUS; SUBCUTANEOUS at 07:41

## 2017-10-02 RX ADMIN — SODIUM CHLORIDE 100 ML: 900 INJECTION, SOLUTION INTRAVENOUS at 08:32

## 2017-10-02 RX ADMIN — HYDROMORPHONE HYDROCHLORIDE 0.3 MG: 1 INJECTION, SOLUTION INTRAMUSCULAR; INTRAVENOUS; SUBCUTANEOUS at 11:47

## 2017-10-02 RX ADMIN — OXYCODONE HYDROCHLORIDE 10 MG: 5 TABLET ORAL at 13:48

## 2017-10-02 RX ADMIN — Medication 20 ML: at 22:07

## 2017-10-02 RX ADMIN — SODIUM CHLORIDE 12.5 MG: 9 INJECTION INTRAMUSCULAR; INTRAVENOUS; SUBCUTANEOUS at 22:17

## 2017-10-02 RX ADMIN — Medication 20 ML: at 05:08

## 2017-10-02 RX ADMIN — Medication 20 ML: at 13:52

## 2017-10-02 RX ADMIN — SOYBEAN OIL 250 ML: 20 INJECTION, SOLUTION INTRAVENOUS at 18:39

## 2017-10-02 NOTE — PROGRESS NOTES
END OF SHIFT NOTE:    INTAKE/OUTPUT  09/30 0701 - 10/01 0700  In: 1983 [I.V.:1983]  Out: 1950 [Urine:1950]  Voiding: YES  Catheter: NO  Drain:              Flatus: Patient does have flatus present. Stool:  1 occurrences. Characteristics:  Stool Assessment  Stool Color: Tan (Comment)  Stool Appearance: Loose  Stool Amount: Medium  Stool Source/Status: Rectum    Emesis: 0 occurrences. Characteristics:        VITAL SIGNS  Patient Vitals for the past 12 hrs:   Temp Pulse Resp BP SpO2   10/01/17 1512 98.1 °F (36.7 °C) 66 16 118/60 96 %   10/01/17 1126 98.1 °F (36.7 °C) 65 16 137/74 96 %       Pain Assessment  Pain Intensity 1: 7 (10/01/17 1900)  Pain Location 1: Abdomen  Pain Intervention(s) 1: Medication (see MAR)  Patient Stated Pain Goal: 0    Ambulating  Yes    Shift report given to oncoming nurse at the bedside.     Roverto Rod RN

## 2017-10-02 NOTE — ANESTHESIA PREPROCEDURE EVALUATION
Anesthetic History               Review of Systems / Medical History  Patient summary reviewed and pertinent labs reviewed    Pulmonary                   Neuro/Psych         Psychiatric history    Comments: anxiety Cardiovascular                  Exercise tolerance: <4 METS     GI/Hepatic/Renal     GERD          Comments: L retroperitoneal fatty mass noted on CT Endo/Other        Obesity     Other Findings   Comments: On TPN and Lipids since admission; PICC line           Physical Exam    Airway  Mallampati: I  TM Distance: > 6 cm  Neck ROM: normal range of motion   Mouth opening: Normal     Cardiovascular    Rhythm: regular           Dental    Dentition: Full lower dentures and Full upper dentures     Pulmonary                 Abdominal  GI exam deferred       Other Findings            Anesthetic Plan    ASA: 3  Anesthesia type: general          Induction: Intravenous  Anesthetic plan and risks discussed with: Patient

## 2017-10-02 NOTE — PROGRESS NOTES
Problem: Nutrition Deficit  Goal: *Optimize nutritional status  Nutrition F/U   TPN management (Dr. Fabio Pollard)   Assessment:   · Diet order(s): 9-18: NPO  · Central line access: Double lumen PICC  · TPN dependent d/t RLQ infected phlegmon/mass with pain N/V. Repeat CT today with findings of:\"No significant interval change when compared with the prior study. Noted consideration of of expl laparotomy Tuesday (10-3) if no better  · BMO changed to daily by Dr Fabio Pollard  · Phos 3.9-current TPN provides ~20 meq/d. May benefit from decreased Phos in TPN  · Cl 109, Na 140-current TPN is maximum Cl anions and contains 75 meq NaCl/L. Pt is receiving significant NaCl from IVATB, Zosyn. May benefit from decreased NaCl in TPN  · Mg 1.9-slight trend down. Current TPN provides ~6 meq Mg/d. May benefit from increased Mg in TPN  · Last 3 Recorded Weights in this Encounter   ·  · 09/30/17 0523 · 10/01/17 0220 · 10/02/17 0605   · Weight: · 106.1 kg (234 lb) · 106.2 kg (234 lb 3.2 oz) · 105.2 kg (232 lb)   · Macronutrient needs:  EER: 2945-5971 kcal /day (13-18 kcal/kg actual BW)   EPR: 65-82 grams protein/day (1.2-1.5 grams/kg IBW). Dr Anna Gill standard request is 2 gm/kg KMP=566 g PRO/d)   Max CHO: 314 grams/day (4mg/kg IBW/min)   Fluid: 1ml/kcal   Intake/Comparative Standards: 2L/d of 10%DEX/ 4.25%AA at 85 ml/hr with 250 ml 20% Lipids daily provides 1520 kcal/d (100% of needs), 85 grams of protein/d (1.56 grams of protein/kg of IBW), 200 grams of CHO/d (does not exceed maximum CHO load) and ~2300 ml of total volume/d (100% of needs). Intervention:   Meals and snacks: NPO   Nutritional Supplement Therapy: Electrolyte replacement per nutritional support protocols are active on MAR. PN:   1. Decrease Phos to 5 meq/L or ~10 meq/d  2. Decrease Na to 30 meq/L  3. Increase Mg to 5 meq/L or ~10 meq/d  4.  If 2 grams protein/kg is desired for this pt, this could be met with 10%DEX/ 4.25%AA at 105 ml/hr with 250 ml 20% Lipids daily provides 48603 98 41 13 kcal/d (100% of needs), 107 grams of protein/d (1.96 grams of protein/kg of IBW), 252 grams of CHO/d (does not exceed maximum CHO load) and ~2770 ml of total volume/d (>100% of needs) vs 2L/d of 15%DEX/5%AA at 85 ml/hr with 250 ml 20% Lipids daily provides 1920 kcal/d (85% of needs), 100 grams of protein/d (1.83 grams of protein/kg of IBW),300 grams of CHO/d (does not exceed maximum CHO load) and ~2300 ml of total volume/d (>100% of needs)     Geno Fatima, RD, LD, [facility] [phone]

## 2017-10-02 NOTE — PROGRESS NOTES
END OF SHIFT NOTE:    INTAKE/OUTPUT  10/01 0701 - 10/02 0700  In: 4709 [I.V.:1724]  Out: 1000 [Urine:1000]  Voiding: YES  Catheter: NO  Drain:              Flatus: Patient does not have flatus present. Stool:  0 occurrences. Characteristics:  Stool Assessment  Stool Color: Tan (Comment)  Stool Appearance: Loose  Stool Amount: Medium  Stool Source/Status: Rectum    Emesis: 0 occurrences. Characteristics:        VITAL SIGNS  Patient Vitals for the past 12 hrs:   Temp Pulse Resp BP SpO2   10/02/17 0651 97.7 °F (36.5 °C) 70 18 113/52 96 %   10/02/17 0300 98.4 °F (36.9 °C) 90 18 117/78 94 %   10/01/17 2302 98.2 °F (36.8 °C) 69 16 128/55 96 %       Pain Assessment  Pain Intensity 1: 0 (10/02/17 0010)  Pain Location 1: Abdomen  Pain Intervention(s) 1: Medication (see MAR)  Patient Stated Pain Goal: 0    Ambulating  Yes    Shift report given to oncoming nurse at the bedside.     Rupert Prieto RN

## 2017-10-02 NOTE — PROGRESS NOTES
H&P/Consult Note/Progress Note/Office Note:   Vincenzo Angulo  MRN: [id number]  :1940  Age:76 y.o.    HPI: Jossie Gutierrez T 28 y. o. female who reports a 1 month h/o RLQ pain.        This was progressively worsening and associated with intermittent N/V. It was moderate in intensity (8/10) leading up to admission. Nothing makes it better or worse. She was treated previously with laxatives without improvement. She came the [facility] ER about 5 days prior to admission and was treated with antibiotics for UTI.  No imaging was done. She came back to the ER on the day of admission and CT scan was obtained as shown below. She is s/p lap francois at [facility] in approx .    She is s/p hysterectomy about 40 yrs ago for benign fibroids. Bhumi Hoover thinks her ovaries remain. She denies associated bloody BMs or diarrhea.            Results for Ang Dus (MRN [id number]) as of 2017 09:35     Ref. Range 2017 16:42   CEA Latest Ref Range: 0.0 - 3.0 ng/mL 0.8   CA-125 Latest Ref Range: 1.5 - 35.0 U/mL 5           17 CT abd/pelvis with contrast with IV but not oral contrast   Minimal linear atelectasis or scarring is present in the lung bases, left greater than right. There is a small to moderate-sized hiatal hernia. No pleural or pericardial fluid.      Small hypoattenuating lesions are present in the right hepatic lobe and left kidney, too small to definitively characterize but statistically likely  representing cysts. There is central biliary ductal ectasia s/p cholecystectomy.      The remaining solid abdominal viscera are unremarkable to the limits of  noncontrast CT. Atherosclerotic abdominal aorta and branch vessels. No upper  abdominal no retroperitoneal lymph node enlargement. Nonopacified small bowel  loops are normal in caliber.      Pelvis:  There is somewhat prominent lipomatosis along the left posterior  pararenal fascia and iliopsoas without associated soft tissue component,  measuring up to 12.0 x 23 x 9 cm. The bladder is predominantly decompressed. Uterus not visualized.      Extensive diverticular changes are present in the colon which is otherwise  normal in caliber. There is a large ill-defined inflammatory process in the  right lower quadrant measuring 5.9 x 10.0 x 6.4 cm approximating the ileocecal  junction from which the appendix is not visualized as a distinctly separate  structure. Multiple high attenuation foci are present within or adjacent to the  bowel, suspicious for medication and/or enteroliths. There is moderate adjacent  inflammatory fat stranding and multiple prominent right lower quadrant  mesenteric lymph nodes, the largest of which measures 1.2 cm in short axis.      Trace dependent free pelvic fluid. No aggressive osseous lesions.      IMPRESSION:  Nonspecific inflammatory process in RLQ at the ileocecal junction. Diff Dx include infectious, and ischemic colitis, appendicitis, inflammatory bowel disease, omental infarct.       Large extraperitoneal predominantly fatty attenuation mass without definite enhancing soft tissue. Size places the patient at risk for sarcoma, the inferior extent of which is not included in the field of view.        9/25/17 CT abd/pelvis with oral and IV contrast   Tiny hypodense lesion near the dome of the liver and another seen within the right posterior lobe. These are too small to further characterize. Otherwise, the liver and spleen enhances homogeneously without discrete lesions. There is no biliary ductal dilatation. Clips are present from prior cholecystectomy. The pancreas and adrenal glands are normal. The kidneys  enhance symmetrically. Bowel loops in the upper abdomen are normal. No definite upper abdominal lymphadenopathy seen.     The RLQ phlegmon seen previously is again noted with significant bowel wall thickening involving the cecum and distal ileum. There is adjacent fat stranding.  The large extraperitoneal fatty mass discussed previously is again noted and is unchanged. The bladder and rectum are normal.  No pelvic adenopathy seen. No free air or free fluid seen within the pelvis.       IMPRESSION: No significant interval change when compared with the prior study.          9/19/17 Abd pain unchanged; Mass-effect in RLQ; PICC requested; on IV ABx; NPO  9/20/17 PICC placed yesterday; on TPN; Nausea persists;  RLQ pain no better since admission and still 8/10  Requests adult diapers secondary to urinary incontinence  9/21/17 says her pain is 8/10 but looks comfortable  9/22/17 pain issues improving  9/23/17 Awake in bed. No new complaints. 9/24/17 Resting in bed. No new complaints. Family at bedside.   9/25/17 Overall improvement in RLQ pain compared to admission  9/26/17 reports RLQ abd pain better since admission  9/27/17 no changes  9/28/17 she feels like RX starting to work with less RLQ pain  9/29/17 slow but steady improvement clinically; still with RLQ pain  9/30/17 RLQ pain better compared to admission  10/1/17 she reports pain 4/10 for only about 5hrs total during the day compared to 8/10 pain 24hrs a day at admission  10/2/17 worsening  Pain now is suprapubic region and LLQ; follow-up CT pending             Past Medical History:   Diagnosis Date    Anxiety state, unspecified     GERD (gastroesophageal reflux disease)     Hypercholesterolemia      Past Surgical History:   Procedure Laterality Date    HX CHOLECYSTECTOMY      HX COLONOSCOPY      Dr Alma Aponte 2012, repeat 5 years    HX GYN      hysterectomy, partial    HX KNEE REPLACEMENT      right     Current Facility-Administered Medications   Medication Dose Route Frequency    saline peripheral flush soln 10 mL  10 mL InterCATHeter RAD ONCE    sodium chloride 0.9 % bolus infusion 100 mL  100 mL IntraVENous RAD ONCE    iopamidol (ISOVUE-370) 76 % injection 100 mL  100 mL IntraVENous RAD ONCE    ondansetron (ZOFRAN ODT) tablet 4 mg  4 mg Oral Q6H PRN    citalopram (CELEXA) tablet 10 mg  10 mg Oral DAILY    enoxaparin (LOVENOX) injection 40 mg  40 mg SubCUTAneous Q12H    TPN ADULT - dextrose 10% amino acid 4.25%   IntraVENous QPM    ondansetron (ZOFRAN) injection 4 mg  4 mg IntraVENous Q4H PRN    promethazine (PHENERGAN) with saline injection 12.5 mg  12.5 mg IntraVENous Q4H PRN    HYDROmorphone (PF) (DILAUDID) injection 0.2-0.3 mg  0.2-0.3 mg IntraVENous Q4H PRN    NUTRITIONAL SUPPORT ELECTROLYTE PRN ORDERS   Does Not Apply PRN    fat emulsion 20% (LIPOSYN, INTRALIPID) infusion 250 mL  250 mL IntraVENous QPM    sodium chloride (NS) flush 20 mL  20 mL InterCATHeter Q8H    heparin (porcine) pf 600 Units  600 Units InterCATHeter Q8H    sodium chloride (NS) flush 20 mL  20 mL InterCATHeter PRN    heparin (porcine) pf 600 Units  600 Units InterCATHeter PRN    acetaminophen (TYLENOL) tablet 1,000 mg  1,000 mg Oral Q6H PRN    oxyCODONE IR (ROXICODONE) tablet 5 mg  5 mg Oral Q4H PRN    Or    oxyCODONE IR (ROXICODONE) tablet 10 mg  10 mg Oral Q4H PRN    piperacillin-tazobactam (ZOSYN) 3.375 g in 0.9% sodium chloride (MBP/ADV) 100 mL  3.375 g IntraVENous Q8H     Morphine  Social History     Social History    Marital status:      Spouse name: N/A    Number of children: N/A    Years of education: N/A     Social History Main Topics    Smoking status: Former Smoker     Quit date: 1/1/1975    Smokeless tobacco: Never Used    Alcohol use No    Drug use: No    Sexual activity: Not on file     Other Topics Concern    Not on file     Social History Narrative     History   Smoking Status    Former Smoker    Quit date: 1/1/1975   Smokeless Tobacco    Never Used     Family History   Problem Relation Age of Onset    Diabetes Mother     Heart Disease Mother     Thyroid Disease Mother     Heart Disease Father      ROS:  Comprehensive review of systems was otherwise unremarkable except as noted above.     Physical Exam:   Visit Vitals    /52    Pulse 70    Temp 97.7 °F (36.5 °C)    Resp 18    Ht 5' 4\" (1.626 m)    Wt 232 lb (105.2 kg)    SpO2 96%    BMI 39.82 kg/m2     Constitutional: Alert, oriented, cooperative patient in no acute distress; appears stated age    Eyes:Sclera are clear. EOMs intact  ENMT: no external lesions gross hearing normal; no obvious neck masses, no ear or lip lesions, nares normal  CV: RRR. Normal perfusion  Resp: No JVD.  Breathing is  non-labored; no audible wheezing.    GI: obese; less firmness/fullness/mass-effect in RLQ; Softer than at admission; tender to much deeper palpation; no guarding    Musculoskeletal: unremarkable with normal function. No embolic signs or cyanosis.    Neuro:  Oriented; moves all 4; no focal deficits  Psychiatric: normal affect and mood, no memory impairment    Recent vitals (if inpt):  Patient Vitals for the past 24 hrs:   BP Temp Pulse Resp SpO2 Weight   10/02/17 0651 113/52 97.7 °F (36.5 °C) 70 18 96 % -   10/02/17 0605 - - - - - 232 lb (105.2 kg)   10/02/17 0300 117/78 98.4 °F (36.9 °C) 90 18 94 % -   10/01/17 2302 128/55 98.2 °F (36.8 °C) 69 16 96 % -   10/01/17 1512 118/60 98.1 °F (36.7 °C) 66 16 96 % -   10/01/17 1126 137/74 98.1 °F (36.7 °C) 65 16 96 % -   10/01/17 0712 138/72 97.7 °F (36.5 °C) 69 16 95 % -       Labs:  Recent Labs      10/02/17   0522  10/01/17   0547   WBC   --   5.6   HGB   --   10.3*   PLT   --   310   NA  140  142   K  4.1  4.0   CL  109*  107   CO2  26  28   BUN  17  14   CREA  0.60  0.61   GLU  101*  93       Lab Results   Component Value Date/Time    WBC 5.6 10/01/2017 05:47 AM    HGB 10.3 10/01/2017 05:47 AM    PLATELET 180 31/75/4529 05:47 AM    Sodium 140 10/02/2017 05:22 AM    Potassium 4.1 10/02/2017 05:22 AM    Chloride 109 10/02/2017 05:22 AM    CO2 26 10/02/2017 05:22 AM    BUN 17 10/02/2017 05:22 AM    Creatinine 0.60 10/02/2017 05:22 AM    Glucose 101 10/02/2017 05:22 AM    Bilirubin, total 0.2 09/25/2017 04:43 AM    AST (SGOT) 34 09/25/2017 04:43 AM ALT (SGPT) 38 09/25/2017 04:43 AM    Alk. phosphatase 92 09/25/2017 04:43 AM       I reviewed recent labs and recent radiologic studies. I independently reviewed radiology images for studies I described above or studies I have ordered.    Admission date (for inpatients): 9/18/2017   * No surgery found *  Procedure(s):  LAPAROTOMY EXPLORATORY POSS BOWEL RESECTION/ 236    ASSESSMENT/PLAN:  Problem List  Date Reviewed: 9/5/2017          Codes Class Noted    Massive Left Retroperitoneal fatty mass seen on CT scan extending from kidney to groin ICD-10-CM: R19.00  ICD-9-CM: 789.30  9/22/2017        Acute cystitis ICD-10-CM: N30.00  ICD-9-CM: 595.0  9/19/2017        RLQ abdominal mass ICD-10-CM: R19.03  ICD-9-CM: 789.33  9/19/2017        RLQ abdominal pain ICD-10-CM: R10.31  ICD-9-CM: 789.03  9/19/2017        Leukocytosis ICD-10-CM: D72.829  ICD-9-CM: 288.60  9/19/2017        Non-intractable vomiting with nausea ICD-10-CM: R11.2  ICD-9-CM: 787.01  9/19/2017        * (Principal)Intra-abdominal infection ICD-10-CM: B99.9  ICD-9-CM: 136.9  9/18/2017        Anxiety state, unspecified ICD-10-CM: F41.1  ICD-9-CM: 300.00  Unknown        GERD (gastroesophageal reflux disease) ICD-10-CM: K21.9  ICD-9-CM: 530.81  Unknown        Hypercholesterolemia ICD-10-CM: E78.00  ICD-9-CM: 272.0  Unknown            Principal Problem:    Intra-abdominal infection (9/18/2017)    Active Problems:    Acute cystitis (9/19/2017)      RLQ abdominal mass (9/19/2017)      RLQ abdominal pain (9/19/2017)      Leukocytosis (9/19/2017)      Non-intractable vomiting with nausea (9/19/2017)      Massive Left Retroperitoneal fatty mass seen on CT scan extending from kidney to groin (9/22/2017)       RLQ phlegmon involving bowel with RLQ pain, N/V and delayed presentation and 1 month prodrome  Starting to see some signs of progress on exam   NPO/Bowel rest, TPN via PICC  IV Zosyn   Repeat CT this am  OR for expl laparotomy tomorrow if no better; she is on OR schedule        Differential Dx  Ruptured (delayed presentation) appendicitis with RLQ phelgmon  Malignancy (carcinoma, sarcoma, ovarian carcinoma)  Epiploicae Appendagitis  IBD  Diverticulitis  Ischemic Enteritis  Infectious Enteritis      In addition, there is also a left retroperitoneal infiltrative fatty mass-->rule out liposarcoma  Further workup for this on hold until we get resolution/stability of the RLQ inflammatory process  CEA and  were normal          Adult diaper for urinary incontinence - Pt couldn't make it to BR and wet bed  IV narcotic prn pain  IV Zofran/phenergan for nausea prn  GI (pepcid- add to TPN) and VTE (lovenox) prophylaxis ordered

## 2017-10-03 ENCOUNTER — ANESTHESIA (OUTPATIENT)
Dept: SURGERY | Age: 77
DRG: 330 | End: 2017-10-03
Payer: MEDICARE

## 2017-10-03 LAB
ANION GAP SERPL CALC-SCNC: 6 MMOL/L (ref 7–16)
BUN SERPL-MCNC: 15 MG/DL (ref 8–23)
CALCIUM SERPL-MCNC: 8.6 MG/DL (ref 8.3–10.4)
CHLORIDE SERPL-SCNC: 107 MMOL/L (ref 98–107)
CO2 SERPL-SCNC: 28 MMOL/L (ref 21–32)
CREAT SERPL-MCNC: 0.63 MG/DL (ref 0.6–1)
GLUCOSE SERPL-MCNC: 93 MG/DL (ref 65–100)
POTASSIUM SERPL-SCNC: 4.1 MMOL/L (ref 3.5–5.1)
SODIUM SERPL-SCNC: 141 MMOL/L (ref 136–145)

## 2017-10-03 PROCEDURE — 74011000250 HC RX REV CODE- 250: Performed by: ANESTHESIOLOGY

## 2017-10-03 PROCEDURE — 36592 COLLECT BLOOD FROM PICC: CPT

## 2017-10-03 PROCEDURE — 87075 CULTR BACTERIA EXCEPT BLOOD: CPT | Performed by: SURGERY

## 2017-10-03 PROCEDURE — 87186 SC STD MICRODIL/AGAR DIL: CPT | Performed by: SURGERY

## 2017-10-03 PROCEDURE — 77030014007 HC SPNG HEMSTAT J&J -B: Performed by: SURGERY

## 2017-10-03 PROCEDURE — 77030008477 HC STYL SATN SLP COVD -A: Performed by: ANESTHESIOLOGY

## 2017-10-03 PROCEDURE — 77030008467 HC STPLR SKN COVD -B: Performed by: SURGERY

## 2017-10-03 PROCEDURE — 77030002996 HC SUT SLK J&J -A: Performed by: SURGERY

## 2017-10-03 PROCEDURE — 0DBW0ZZ EXCISION OF PERITONEUM, OPEN APPROACH: ICD-10-PCS | Performed by: SURGERY

## 2017-10-03 PROCEDURE — 0DTF0ZZ RESECTION OF RIGHT LARGE INTESTINE, OPEN APPROACH: ICD-10-PCS | Performed by: SURGERY

## 2017-10-03 PROCEDURE — 74011250636 HC RX REV CODE- 250/636

## 2017-10-03 PROCEDURE — 77030010296 HC STPLR INT COVD -B: Performed by: SURGERY

## 2017-10-03 PROCEDURE — 74011250637 HC RX REV CODE- 250/637: Performed by: NURSE PRACTITIONER

## 2017-10-03 PROCEDURE — 77030034850: Performed by: SURGERY

## 2017-10-03 PROCEDURE — 77030008771 HC TU NG SALEM SUMP -A: Performed by: ANESTHESIOLOGY

## 2017-10-03 PROCEDURE — 77030011640 HC PAD GRND REM COVD -A: Performed by: SURGERY

## 2017-10-03 PROCEDURE — 77030019908 HC STETH ESOPH SIMS -A: Performed by: ANESTHESIOLOGY

## 2017-10-03 PROCEDURE — 76060000036 HC ANESTHESIA 2.5 TO 3 HR: Performed by: SURGERY

## 2017-10-03 PROCEDURE — 77030012407 HC DRN WND BARD -B: Performed by: SURGERY

## 2017-10-03 PROCEDURE — 77030002966 HC SUT PDS J&J -A: Performed by: SURGERY

## 2017-10-03 PROCEDURE — 80048 BASIC METABOLIC PNL TOTAL CA: CPT | Performed by: SURGERY

## 2017-10-03 PROCEDURE — 76210000016 HC OR PH I REC 1 TO 1.5 HR: Performed by: SURGERY

## 2017-10-03 PROCEDURE — 88305 TISSUE EXAM BY PATHOLOGIST: CPT | Performed by: SURGERY

## 2017-10-03 PROCEDURE — 77030011278 HC ELECTRD LIG IMPT COVD -F: Performed by: SURGERY

## 2017-10-03 PROCEDURE — 77030031139 HC SUT VCRL2 J&J -A: Performed by: SURGERY

## 2017-10-03 PROCEDURE — 74011250637 HC RX REV CODE- 250/637: Performed by: ANESTHESIOLOGY

## 2017-10-03 PROCEDURE — 74011000258 HC RX REV CODE- 258: Performed by: SURGERY

## 2017-10-03 PROCEDURE — 77030025240 HC RELD STPL GIA 2 COVD -C: Performed by: SURGERY

## 2017-10-03 PROCEDURE — 77030011264 HC ELECTRD BLD EXT COVD -A: Performed by: SURGERY

## 2017-10-03 PROCEDURE — 77030020782 HC GWN BAIR PAWS FLX 3M -B: Performed by: ANESTHESIOLOGY

## 2017-10-03 PROCEDURE — 87205 SMEAR GRAM STAIN: CPT | Performed by: SURGERY

## 2017-10-03 PROCEDURE — 74011250637 HC RX REV CODE- 250/637: Performed by: SURGERY

## 2017-10-03 PROCEDURE — 77030009978 HC RELD STPLR TCR J&J -B: Performed by: SURGERY

## 2017-10-03 PROCEDURE — 0D9W0ZZ DRAINAGE OF PERITONEUM, OPEN APPROACH: ICD-10-PCS | Performed by: SURGERY

## 2017-10-03 PROCEDURE — 77030003029 HC SUT VCRL J&J -B: Performed by: SURGERY

## 2017-10-03 PROCEDURE — 77030013567 HC DRN WND RESERV BARD -A: Performed by: SURGERY

## 2017-10-03 PROCEDURE — 87077 CULTURE AEROBIC IDENTIFY: CPT | Performed by: SURGERY

## 2017-10-03 PROCEDURE — 77030034818 HC STPLR INT GIA COVD -C: Performed by: SURGERY

## 2017-10-03 PROCEDURE — 0WBH0ZX EXCISION OF RETROPERITONEUM, OPEN APPROACH, DIAGNOSTIC: ICD-10-PCS | Performed by: SURGERY

## 2017-10-03 PROCEDURE — 74011000250 HC RX REV CODE- 250

## 2017-10-03 PROCEDURE — 74011250636 HC RX REV CODE- 250/636: Performed by: SURGERY

## 2017-10-03 PROCEDURE — 77030008771 HC TU NG SALEM SUMP -A: Performed by: SURGERY

## 2017-10-03 PROCEDURE — 77030010514 HC APPL CLP LIG COVD -B: Performed by: SURGERY

## 2017-10-03 PROCEDURE — 76010000132 HC OR TIME 2.5 TO 3 HR: Performed by: SURGERY

## 2017-10-03 PROCEDURE — 77030010293 HC STPLR INT TI J&J -B: Performed by: SURGERY

## 2017-10-03 PROCEDURE — 74011000250 HC RX REV CODE- 250: Performed by: SURGERY

## 2017-10-03 PROCEDURE — 65270000029 HC RM PRIVATE

## 2017-10-03 PROCEDURE — 77030018836 HC SOL IRR NACL ICUM -A: Performed by: SURGERY

## 2017-10-03 PROCEDURE — 77030008703 HC TU ET UNCUF COVD -A: Performed by: ANESTHESIOLOGY

## 2017-10-03 RX ORDER — OXYCODONE HYDROCHLORIDE 5 MG/1
5 TABLET ORAL
Status: DISCONTINUED | OUTPATIENT
Start: 2017-10-03 | End: 2017-10-03 | Stop reason: HOSPADM

## 2017-10-03 RX ORDER — SODIUM CHLORIDE 0.9 % (FLUSH) 0.9 %
5-10 SYRINGE (ML) INJECTION AS NEEDED
Status: DISCONTINUED | OUTPATIENT
Start: 2017-10-03 | End: 2017-10-08

## 2017-10-03 RX ORDER — PROPOFOL 10 MG/ML
INJECTION, EMULSION INTRAVENOUS AS NEEDED
Status: DISCONTINUED | OUTPATIENT
Start: 2017-10-03 | End: 2017-10-03 | Stop reason: HOSPADM

## 2017-10-03 RX ORDER — SODIUM CHLORIDE 0.9 % (FLUSH) 0.9 %
5-10 SYRINGE (ML) INJECTION AS NEEDED
Status: DISCONTINUED | OUTPATIENT
Start: 2017-10-03 | End: 2017-10-03 | Stop reason: HOSPADM

## 2017-10-03 RX ORDER — ROCURONIUM BROMIDE 10 MG/ML
INJECTION, SOLUTION INTRAVENOUS AS NEEDED
Status: DISCONTINUED | OUTPATIENT
Start: 2017-10-03 | End: 2017-10-03 | Stop reason: HOSPADM

## 2017-10-03 RX ORDER — SODIUM CHLORIDE, SODIUM LACTATE, POTASSIUM CHLORIDE, CALCIUM CHLORIDE 600; 310; 30; 20 MG/100ML; MG/100ML; MG/100ML; MG/100ML
50 INJECTION, SOLUTION INTRAVENOUS CONTINUOUS
Status: DISCONTINUED | OUTPATIENT
Start: 2017-10-03 | End: 2017-10-03 | Stop reason: HOSPADM

## 2017-10-03 RX ORDER — SODIUM CHLORIDE 0.9 % (FLUSH) 0.9 %
5-10 SYRINGE (ML) INJECTION EVERY 8 HOURS
Status: DISCONTINUED | OUTPATIENT
Start: 2017-10-03 | End: 2017-10-04

## 2017-10-03 RX ORDER — SODIUM CHLORIDE, SODIUM LACTATE, POTASSIUM CHLORIDE, CALCIUM CHLORIDE 600; 310; 30; 20 MG/100ML; MG/100ML; MG/100ML; MG/100ML
80 INJECTION, SOLUTION INTRAVENOUS CONTINUOUS
Status: DISCONTINUED | OUTPATIENT
Start: 2017-10-03 | End: 2017-10-05

## 2017-10-03 RX ORDER — MIDAZOLAM HYDROCHLORIDE 1 MG/ML
2 INJECTION, SOLUTION INTRAMUSCULAR; INTRAVENOUS
Status: DISCONTINUED | OUTPATIENT
Start: 2017-10-03 | End: 2017-10-03 | Stop reason: HOSPADM

## 2017-10-03 RX ORDER — SODIUM CHLORIDE, SODIUM LACTATE, POTASSIUM CHLORIDE, CALCIUM CHLORIDE 600; 310; 30; 20 MG/100ML; MG/100ML; MG/100ML; MG/100ML
INJECTION, SOLUTION INTRAVENOUS
Status: DISCONTINUED | OUTPATIENT
Start: 2017-10-03 | End: 2017-10-03 | Stop reason: HOSPADM

## 2017-10-03 RX ORDER — TRISODIUM CITRATE DIHYDRATE AND CITRIC ACID MONOHYDRATE 500; 334 MG/5ML; MG/5ML
30 SOLUTION ORAL
Status: COMPLETED | OUTPATIENT
Start: 2017-10-03 | End: 2017-10-03

## 2017-10-03 RX ORDER — FENTANYL CITRATE 50 UG/ML
100 INJECTION, SOLUTION INTRAMUSCULAR; INTRAVENOUS ONCE
Status: DISCONTINUED | OUTPATIENT
Start: 2017-10-03 | End: 2017-10-03 | Stop reason: HOSPADM

## 2017-10-03 RX ORDER — MIDAZOLAM HYDROCHLORIDE 1 MG/ML
2 INJECTION, SOLUTION INTRAMUSCULAR; INTRAVENOUS ONCE
Status: DISCONTINUED | OUTPATIENT
Start: 2017-10-03 | End: 2017-10-03 | Stop reason: HOSPADM

## 2017-10-03 RX ORDER — CELECOXIB 200 MG/1
200 CAPSULE ORAL
Status: DISCONTINUED | OUTPATIENT
Start: 2017-10-03 | End: 2017-10-03 | Stop reason: HOSPADM

## 2017-10-03 RX ORDER — FENTANYL CITRATE 50 UG/ML
INJECTION, SOLUTION INTRAMUSCULAR; INTRAVENOUS AS NEEDED
Status: DISCONTINUED | OUTPATIENT
Start: 2017-10-03 | End: 2017-10-03 | Stop reason: HOSPADM

## 2017-10-03 RX ORDER — SODIUM CHLORIDE, SODIUM LACTATE, POTASSIUM CHLORIDE, CALCIUM CHLORIDE 600; 310; 30; 20 MG/100ML; MG/100ML; MG/100ML; MG/100ML
75 INJECTION, SOLUTION INTRAVENOUS CONTINUOUS
Status: DISCONTINUED | OUTPATIENT
Start: 2017-10-03 | End: 2017-10-03 | Stop reason: HOSPADM

## 2017-10-03 RX ORDER — DEXAMETHASONE SODIUM PHOSPHATE 4 MG/ML
INJECTION, SOLUTION INTRA-ARTICULAR; INTRALESIONAL; INTRAMUSCULAR; INTRAVENOUS; SOFT TISSUE AS NEEDED
Status: DISCONTINUED | OUTPATIENT
Start: 2017-10-03 | End: 2017-10-03 | Stop reason: HOSPADM

## 2017-10-03 RX ORDER — ALBUTEROL SULFATE 0.83 MG/ML
2.5 SOLUTION RESPIRATORY (INHALATION) AS NEEDED
Status: DISCONTINUED | OUTPATIENT
Start: 2017-10-03 | End: 2017-10-03 | Stop reason: HOSPADM

## 2017-10-03 RX ORDER — HYDROMORPHONE HYDROCHLORIDE 2 MG/ML
INJECTION, SOLUTION INTRAMUSCULAR; INTRAVENOUS; SUBCUTANEOUS AS NEEDED
Status: DISCONTINUED | OUTPATIENT
Start: 2017-10-03 | End: 2017-10-03 | Stop reason: HOSPADM

## 2017-10-03 RX ORDER — LIDOCAINE HYDROCHLORIDE 10 MG/ML
0.1 INJECTION INFILTRATION; PERINEURAL AS NEEDED
Status: DISCONTINUED | OUTPATIENT
Start: 2017-10-03 | End: 2017-10-03 | Stop reason: HOSPADM

## 2017-10-03 RX ORDER — ONDANSETRON 2 MG/ML
INJECTION INTRAMUSCULAR; INTRAVENOUS AS NEEDED
Status: DISCONTINUED | OUTPATIENT
Start: 2017-10-03 | End: 2017-10-03 | Stop reason: HOSPADM

## 2017-10-03 RX ORDER — HYDROMORPHONE HYDROCHLORIDE 2 MG/ML
0.5 INJECTION, SOLUTION INTRAMUSCULAR; INTRAVENOUS; SUBCUTANEOUS
Status: DISCONTINUED | OUTPATIENT
Start: 2017-10-03 | End: 2017-10-03 | Stop reason: HOSPADM

## 2017-10-03 RX ORDER — LIDOCAINE HYDROCHLORIDE 20 MG/ML
INJECTION, SOLUTION EPIDURAL; INFILTRATION; INTRACAUDAL; PERINEURAL AS NEEDED
Status: DISCONTINUED | OUTPATIENT
Start: 2017-10-03 | End: 2017-10-03 | Stop reason: HOSPADM

## 2017-10-03 RX ADMIN — SODIUM CHLORIDE, SODIUM LACTATE, POTASSIUM CHLORIDE, CALCIUM CHLORIDE: 600; 310; 30; 20 INJECTION, SOLUTION INTRAVENOUS at 16:45

## 2017-10-03 RX ADMIN — SODIUM CITRATE AND CITRIC ACID MONOHYDRATE 30 ML: 500; 334 SOLUTION ORAL at 13:28

## 2017-10-03 RX ADMIN — FENTANYL CITRATE 50 MCG: 50 INJECTION, SOLUTION INTRAMUSCULAR; INTRAVENOUS at 15:24

## 2017-10-03 RX ADMIN — HYDROMORPHONE HYDROCHLORIDE: 2 INJECTION INTRAMUSCULAR; INTRAVENOUS; SUBCUTANEOUS at 18:08

## 2017-10-03 RX ADMIN — SODIUM CHLORIDE, SODIUM LACTATE, POTASSIUM CHLORIDE, CALCIUM CHLORIDE: 600; 310; 30; 20 INJECTION, SOLUTION INTRAVENOUS at 14:49

## 2017-10-03 RX ADMIN — ROCURONIUM BROMIDE 50 MG: 10 INJECTION, SOLUTION INTRAVENOUS at 14:58

## 2017-10-03 RX ADMIN — PIPERACILLIN SODIUM,TAZOBACTAM SODIUM 3.38 G: 3; .375 INJECTION, POWDER, FOR SOLUTION INTRAVENOUS at 22:56

## 2017-10-03 RX ADMIN — CITALOPRAM HYDROBROMIDE 10 MG: 20 TABLET ORAL at 09:23

## 2017-10-03 RX ADMIN — HYDROMORPHONE HYDROCHLORIDE 0.3 MG: 1 INJECTION, SOLUTION INTRAMUSCULAR; INTRAVENOUS; SUBCUTANEOUS at 09:56

## 2017-10-03 RX ADMIN — ACETAMINOPHEN 1000 MG: 500 TABLET, FILM COATED ORAL at 02:59

## 2017-10-03 RX ADMIN — POTASSIUM CHLORIDE: 2 INJECTION, SOLUTION, CONCENTRATE INTRAVENOUS at 19:44

## 2017-10-03 RX ADMIN — PIPERACILLIN SODIUM,TAZOBACTAM SODIUM 3.38 G: 3; .375 INJECTION, POWDER, FOR SOLUTION INTRAVENOUS at 06:08

## 2017-10-03 RX ADMIN — SOYBEAN OIL 250 ML: 20 INJECTION, SOLUTION INTRAVENOUS at 19:43

## 2017-10-03 RX ADMIN — DEXAMETHASONE SODIUM PHOSPHATE 10 MG: 4 INJECTION, SOLUTION INTRA-ARTICULAR; INTRALESIONAL; INTRAMUSCULAR; INTRAVENOUS; SOFT TISSUE at 15:00

## 2017-10-03 RX ADMIN — LIDOCAINE HYDROCHLORIDE 40 MG: 20 INJECTION, SOLUTION EPIDURAL; INFILTRATION; INTRACAUDAL; PERINEURAL at 14:57

## 2017-10-03 RX ADMIN — PROPOFOL 100 MG: 10 INJECTION, EMULSION INTRAVENOUS at 14:57

## 2017-10-03 RX ADMIN — ONDANSETRON 4 MG: 2 INJECTION INTRAMUSCULAR; INTRAVENOUS at 13:41

## 2017-10-03 RX ADMIN — ENOXAPARIN SODIUM 40 MG: 40 INJECTION SUBCUTANEOUS at 21:46

## 2017-10-03 RX ADMIN — LIDOCAINE HYDROCHLORIDE 40 MG: 20 INJECTION, SOLUTION EPIDURAL; INFILTRATION; INTRACAUDAL; PERINEURAL at 14:58

## 2017-10-03 RX ADMIN — HYDROMORPHONE HYDROCHLORIDE 0.3 MG: 1 INJECTION, SOLUTION INTRAMUSCULAR; INTRAVENOUS; SUBCUTANEOUS at 03:04

## 2017-10-03 RX ADMIN — HYDROMORPHONE HYDROCHLORIDE 0.4 MG: 2 INJECTION, SOLUTION INTRAMUSCULAR; INTRAVENOUS; SUBCUTANEOUS at 16:00

## 2017-10-03 RX ADMIN — FAMOTIDINE 20 MG: 10 INJECTION, SOLUTION INTRAVENOUS at 13:29

## 2017-10-03 RX ADMIN — Medication 10 ML: at 21:48

## 2017-10-03 RX ADMIN — SODIUM CHLORIDE, SODIUM LACTATE, POTASSIUM CHLORIDE, AND CALCIUM CHLORIDE 25 ML/HR: 600; 310; 30; 20 INJECTION, SOLUTION INTRAVENOUS at 19:35

## 2017-10-03 RX ADMIN — PROPOFOL 50 MG: 10 INJECTION, EMULSION INTRAVENOUS at 15:30

## 2017-10-03 RX ADMIN — Medication 600 UNITS: at 04:07

## 2017-10-03 RX ADMIN — ONDANSETRON 4 MG: 2 INJECTION INTRAMUSCULAR; INTRAVENOUS at 17:45

## 2017-10-03 RX ADMIN — Medication 20 ML: at 04:07

## 2017-10-03 RX ADMIN — FENTANYL CITRATE 50 MCG: 50 INJECTION, SOLUTION INTRAMUSCULAR; INTRAVENOUS at 14:57

## 2017-10-03 NOTE — PROGRESS NOTES
END OF SHIFT NOTE:    INTAKE/OUTPUT  10/02 0701 - 10/03 0700  In: 2153 [I.V.:1572]  Out: 1400 [Urine:1100]  Voiding: YES  Catheter: NO  Drain:              Flatus: Patient does have flatus present. Stool:  0 occurrences. Characteristics:  Stool Assessment  Stool Color: Tan (Comment)  Stool Appearance: Loose  Stool Amount: Medium  Stool Source/Status: Rectum    Emesis: 0 occurrences. Characteristics:        VITAL SIGNS  Patient Vitals for the past 12 hrs:   Temp Pulse Resp BP SpO2   10/03/17 0304 98.6 °F (37 °C) 70 16 110/65 94 %   10/02/17 2350 98.2 °F (36.8 °C) 67 16 112/62 94 %   10/02/17 2000 98.3 °F (36.8 °C) 60 18 106/70 93 %       Pain Assessment  Pain Intensity 1: 7 (10/03/17 0305)  Pain Location 1: Abdomen  Pain Intervention(s) 1: Medication (see MAR)  Patient Stated Pain Goal: 0    Ambulating  Yes    Shift report given to oncoming nurse at the bedside.     Bertha Peralta RN

## 2017-10-03 NOTE — BRIEF OP NOTE
BRIEF OPERATIVE NOTE    Date of Procedure:  10/3/2017    Preoperative Diagnosis:  REFRACTORY, INFLAMMATORY VS NEOPLASTIC RIGHT LOWER QUADRANT MASS  Unrelated Left retroperitoneal fatty neoplasm  Morbid obesity  Postoperative Diagnosis: same    Procedure:  Procedure(s):  EXPLORATORY LAPAROTOMY, EXCISION OF PERITONEAL NODULE, RIGHT COLECTOMY.    Drainage of Localized peritonitis  Left retroperitoneal mass biopsy    Surgeon(s) and Role:     * Bayron Mcbride MD - Primary  Anesthesia: General  Estimated Blood Loss: <30cc  Specimens:   ID Type Source Tests Collected by Time Destination   1 : PERITONEAL NODULE Preservative Perianal  Bayron Mcbride MD 10/3/2017 1540 Pathology   2 : RIGHT COLON WITH CECAL MASS Fresh Colon  Bayron Mcbride MD 10/3/2017 1642 Pathology   3 : RETROPERITONEAL MASS Preservative Mass  Bayron Mcbride MD 10/3/2017 1709 Pathology   1 : LOCALIZED PERITONITIS Body Fluid Peritoneal Fluid CULTURE, ANAEROBIC, GRAM STAIN Bayron Mcbride MD 10/3/2017 1708 Microbiology     Findings: see op note   Complications: none  Implants: * No implants in log *

## 2017-10-03 NOTE — PROGRESS NOTES
TRANSFER - IN REPORT:    Verbal report received from Montse Kebede RN(name) on Maria Luz Campbell  being received from PACU(unit) for routine progression of care      Report consisted of patients Situation, Background, Assessment and   Recommendations(SBAR). Information from the following report(s) SBAR, Kardex and OR Summary was reviewed with the receiving nurse. Opportunity for questions and clarification was provided. Patient has a PCA and NGT and STELLA. Family at bedside. Assessment completed upon patients arrival to unit and care assumed. Night shift RN also present during patient admission and assumed care.

## 2017-10-03 NOTE — PERIOP NOTES
TRANSFER - OUT REPORT:    Verbal report given to [facility] RN(name) on Toni Daniels  being transferred to Room 236(unit) for routine post - op       Report consisted of patients Situation, Background, Assessment and   Recommendations(SBAR). Information from the following report(s) SBAR, Kardex, OR Summary, Intake/Output and MAR was reviewed with the receiving nurse. Lines:   PICC Double Lumen 09/19/17 Right;Brachial (Active)   Central Line Being Utilized Yes 10/3/2017  8:00 AM   Criteria for Appropriate Use Total parenteral nutrition 10/3/2017  8:00 AM   Site Assessment Clean, dry, & intact 10/3/2017  8:00 AM   Phlebitis Assessment 0 10/3/2017  8:00 AM   Infiltration Assessment 0 10/3/2017  8:00 AM   Arm Circumference (cm) 40 cm 9/19/2017 11:30 AM   Date of Last Dressing Change 09/26/17 10/3/2017  8:00 AM   Dressing Status Clean, dry, & intact 10/3/2017  8:00 AM   Action Taken Blood drawn 10/3/2017  4:07 AM   External Catheter Length (cm) 0 centimeters 9/19/2017 11:30 AM   Dressing Type Disk with Chlorhexadine gluconate (CHG) 10/3/2017  8:00 AM   Hub Color/Line Status Infusing 10/2/2017  7:40 AM   Positive Blood Return (Site #1) Yes 10/3/2017  8:00 AM   Hub Color/Line Status Infusing 10/3/2017  8:00 AM   Positive Blood Return (Site #2) Yes 10/3/2017  8:00 AM   Alcohol Cap Used Yes 10/1/2017  7:00 PM        Opportunity for questions and clarification was provided. Patient transported with:   O2 @ 3 liters    VTE prophylaxis orders have been written for Toni Daniels. Patient and family given floor number and nurses name. Family updated re: pt status after security code verified.

## 2017-10-03 NOTE — PROGRESS NOTES
Ms. Jhonatan Jerel without assist at this time. She is having surgery today. Please order PT post op.   Sarita Mera, PT

## 2017-10-03 NOTE — ANESTHESIA POSTPROCEDURE EVALUATION
Post-Anesthesia Evaluation and Assessment    Patient: Amparo Gupta MRN: [id number]  SSN: [id number]    YOB: 1940  Age: 68 y.o. Sex: female       Cardiovascular Function/Vital Signs  Visit Vitals    /63    Pulse 88    Temp 36.6 °C (97.8 °F)    Resp 12    Ht 5' 4\" (1.626 m)    Wt 104.3 kg (230 lb)    SpO2 94%    BMI 39.48 kg/m2       Patient is status post general anesthesia for Procedure(s):  EXPLORATORY LAPAROTOMY, EXCISION OF PERITONEAL NODULE, RIGHT COLECTOMY. .    Nausea/Vomiting: None    Postoperative hydration reviewed and adequate. Pain:  Pain Scale 1: Visual (10/03/17 1747)  Pain Intensity 1: 0 (10/03/17 1747)   Managed    Neurological Status:   Neuro (WDL): Exceptions to WDL (10/03/17 1747)  Neuro  Neurologic State: Drowsy (10/03/17 1747)  LUE Motor Response: Purposeful (10/03/17 1747)  LLE Motor Response: Purposeful (10/03/17 1747)  RUE Motor Response: Purposeful (10/03/17 1747)  RLE Motor Response: Purposeful (10/03/17 1747)   At baseline    Mental Status and Level of Consciousness: Awake. Pulmonary Status:   O2 Device: Nasal cannula (10/03/17 1747)   Adequate oxygenation and airway patent    Complications related to anesthesia: None    Post-anesthesia assessment completed.  No concerns    Signed By: Duncan Garcia MD     October 3, 2017

## 2017-10-03 NOTE — PROGRESS NOTES
Problem: Nutrition Deficit  Goal: *Optimize nutritional status  Nutrition F/U: TPN Management- Reviewed, no change to TPN at this time.    Lulu Barron, [address] [place] [address], [address] [place] [address]

## 2017-10-03 NOTE — PROGRESS NOTES
H&P/Consult Note/Progress Note/Office Note:   Jannette Pretty  MRN: [id number]  :1940  Age:76 y.o.    HPI: Marjan THOMAS 71 y. o. female who reports a 1 month h/o RLQ pain.        This was progressively worsening and associated with intermittent N/V. It was moderate in intensity (8/10) leading up to admission. Nothing makes it better or worse. She was treated previously with laxatives without improvement. She came the [facility] ER about 5 days prior to admission and was treated with antibiotics for UTI.  No imaging was done. She came back to the ER on the day of admission and CT scan was obtained as shown below. She is s/p lap francois at [facility] in approx .    She is s/p hysterectomy about 40 yrs ago for benign fibroids. Dasia Gonzáles thinks her ovaries remain. She denies associated bloody BMs or diarrhea.            Results for Indira Hurtado (MRN [id number]) as of 2017 09:35     Ref. Range 2017 16:42   CEA Latest Ref Range: 0.0 - 3.0 ng/mL 0.8   CA-125 Latest Ref Range: 1.5 - 35.0 U/mL 5           17 CT abd/pelvis with contrast with IV but not oral contrast   Minimal linear atelectasis or scarring is present in the lung bases, left greater than right. There is a small to moderate-sized hiatal hernia. No pleural or pericardial fluid.      Small hypoattenuating lesions are present in the right hepatic lobe and left kidney, too small to definitively characterize but statistically likely  representing cysts. There is central biliary ductal ectasia s/p cholecystectomy.      The remaining solid abdominal viscera are unremarkable to the limits of  noncontrast CT. Atherosclerotic abdominal aorta and branch vessels. No upper  abdominal no retroperitoneal lymph node enlargement. Nonopacified small bowel  loops are normal in caliber.      Pelvis:  There is somewhat prominent lipomatosis along the left posterior  pararenal fascia and iliopsoas without associated soft tissue component,  measuring up to 12.0 x 23 x 9 cm. The bladder is predominantly decompressed. Uterus not visualized.      Extensive diverticular changes are present in the colon which is otherwise  normal in caliber. There is a large ill-defined inflammatory process in the  right lower quadrant measuring 5.9 x 10.0 x 6.4 cm approximating the ileocecal  junction from which the appendix is not visualized as a distinctly separate  structure. Multiple high attenuation foci are present within or adjacent to the  bowel, suspicious for medication and/or enteroliths. There is moderate adjacent  inflammatory fat stranding and multiple prominent right lower quadrant  mesenteric lymph nodes, the largest of which measures 1.2 cm in short axis.      Trace dependent free pelvic fluid. No aggressive osseous lesions.      IMPRESSION:  Nonspecific inflammatory process in RLQ at the ileocecal junction. Diff Dx include infectious, and ischemic colitis, appendicitis, inflammatory bowel disease, omental infarct.       Large extraperitoneal predominantly fatty attenuation mass without definite enhancing soft tissue. Size places the patient at risk for sarcoma, the inferior extent of which is not included in the field of view.        9/25/17 CT abd/pelvis with oral and IV contrast   Tiny hypodense lesion near the dome of the liver and another seen within the right posterior lobe. These are too small to further characterize. Otherwise, the liver and spleen enhances homogeneously without discrete lesions. There is no biliary ductal dilatation. Clips are present from prior cholecystectomy. The pancreas and adrenal glands are normal. The kidneys  enhance symmetrically. Bowel loops in the upper abdomen are normal. No definite upper abdominal lymphadenopathy seen.     The RLQ phlegmon seen previously is again noted with significant bowel wall thickening involving the cecum and distal ileum. There is adjacent fat stranding.  The large extraperitoneal fatty mass discussed previously is again noted and is unchanged. The bladder and rectum are normal.  No pelvic adenopathy seen. No free air or free fluid seen within the pelvis.       IMPRESSION: No significant interval change when compared with the prior study. 10/2/17 CT abd/pelvis with oral and IV contrast  There is scarring within the lingula with mild bibasilar atelectasis. There is a hiatal hernia present.     CT abdomen: The liver and spleen enhances homogeneously without discrete lesions. There is no biliary ductal dilatation. Clips are present from prior cholecystectomy. The pancreas and adrenal glands are normal. The kidneys enhance  symmetrically. Bowel loops in the upper abdomen are normal. No definite upper abdominal lymphadenopathy seen.     CT pelvis: No change in the appearance of the large fat density mass within the retroperitoneum on the left. In addition, the right lower quadrant phlegmon with bowel wall thickening is again seen and is unchanged. There is extensive  diverticulosis. The bladder and rectum are normal. No pelvic adenopathy seen. No free air or free fluid seen within the pelvis. Bone window evaluation demonstrates no aggressive osseous lesions.     IMPRESSION: No significant interval change when compared with the prior study.           9/19/17 Abd pain unchanged; Mass-effect in RLQ; PICC requested; on IV ABx; NPO  9/20/17 PICC placed yesterday; on TPN; Nausea persists;  RLQ pain no better since admission and still 8/10  Requests adult diapers secondary to urinary incontinence  9/21/17 says her pain is 8/10 but looks comfortable  9/22/17 pain issues improving  9/23/17 Awake in bed. No new complaints. 9/24/17 Resting in bed. No new complaints. Family at bedside.   9/25/17 Overall improvement in RLQ pain compared to admission  9/26/17 reports RLQ abd pain better since admission  9/27/17 no changes  9/28/17 she feels like RX starting to work with less RLQ pain  9/29/17 slow but steady improvement clinically; still with RLQ pain  9/30/17 RLQ pain better compared to admission  10/1/17 she reports pain 4/10 for only about 5hrs total during the day compared to 8/10 pain 24hrs a day at admission  10/2/17 worsening  Pain now is suprapubic region and LLQ; follow-up CT pending  10/3/17 CT images reviewed, decided to proceed with laparotomy;  Informed consent obtained             Past Medical History:   Diagnosis Date    Anxiety state, unspecified     GERD (gastroesophageal reflux disease)     Hypercholesterolemia      Past Surgical History:   Procedure Laterality Date    HX CHOLECYSTECTOMY      HX COLONOSCOPY      Dr Lis Mg 2012, repeat 5 years    HX GYN      hysterectomy, partial    HX KNEE REPLACEMENT      right     Current Facility-Administered Medications   Medication Dose Route Frequency    TPN ADULT - dextrose 10% amino acid 4.25%   IntraVENous QPM    ondansetron (ZOFRAN ODT) tablet 4 mg  4 mg Oral Q6H PRN    citalopram (CELEXA) tablet 10 mg  10 mg Oral DAILY    enoxaparin (LOVENOX) injection 40 mg  40 mg SubCUTAneous Q12H    ondansetron (ZOFRAN) injection 4 mg  4 mg IntraVENous Q4H PRN    promethazine (PHENERGAN) with saline injection 12.5 mg  12.5 mg IntraVENous Q4H PRN    HYDROmorphone (PF) (DILAUDID) injection 0.2-0.3 mg  0.2-0.3 mg IntraVENous Q4H PRN    NUTRITIONAL SUPPORT ELECTROLYTE PRN ORDERS   Does Not Apply PRN    fat emulsion 20% (LIPOSYN, INTRALIPID) infusion 250 mL  250 mL IntraVENous QPM    sodium chloride (NS) flush 20 mL  20 mL InterCATHeter Q8H    heparin (porcine) pf 600 Units  600 Units InterCATHeter Q8H    sodium chloride (NS) flush 20 mL  20 mL InterCATHeter PRN    heparin (porcine) pf 600 Units  600 Units InterCATHeter PRN    acetaminophen (TYLENOL) tablet 1,000 mg  1,000 mg Oral Q6H PRN    oxyCODONE IR (ROXICODONE) tablet 5 mg  5 mg Oral Q4H PRN    Or    oxyCODONE IR (ROXICODONE) tablet 10 mg  10 mg Oral Q4H PRN    piperacillin-tazobactam (ZOSYN) 3.375 g in 0.9% sodium chloride (MBP/ADV) 100 mL  3.375 g IntraVENous Q8H     Morphine  Social History     Social History    Marital status:      Spouse name: N/A    Number of children: N/A    Years of education: N/A     Social History Main Topics    Smoking status: Former Smoker     Quit date: 1/1/1975    Smokeless tobacco: Never Used    Alcohol use No    Drug use: No    Sexual activity: Not on file     Other Topics Concern    Not on file     Social History Narrative     History   Smoking Status    Former Smoker    Quit date: 1/1/1975   Smokeless Tobacco    Never Used     Family History   Problem Relation Age of Onset    Diabetes Mother     Heart Disease Mother     Thyroid Disease Mother     Heart Disease Father      ROS:  Comprehensive review of systems was otherwise unremarkable except as noted above. Physical Exam:   Visit Vitals    /65    Pulse 70    Temp 98.6 °F (37 °C)    Resp 16    Ht 5' 4\" (1.626 m)    Wt 230 lb (104.3 kg)    SpO2 94%    BMI 39.48 kg/m2     Constitutional: Alert, oriented, cooperative patient in no acute distress; appears stated age    Eyes:Sclera are clear. EOMs intact  ENMT: no external lesions gross hearing normal; no obvious neck masses, no ear or lip lesions, nares normal  CV: RRR. Normal perfusion  Resp: No JVD.  Breathing is  non-labored; no audible wheezing.    GI: obese; less firmness/fullness/mass-effect in RLQ; Softer than at admission; tender to much deeper palpation; no guarding    Musculoskeletal: unremarkable with normal function. No embolic signs or cyanosis.    Neuro:  Oriented; moves all 4; no focal deficits  Psychiatric: normal affect and mood, no memory impairment    Recent vitals (if inpt):  Patient Vitals for the past 24 hrs:   BP Temp Pulse Resp SpO2 Weight   10/03/17 0437 - - - - - 230 lb (104.3 kg)   10/03/17 0304 110/65 98.6 °F (37 °C) 70 16 94 % -   10/02/17 2350 112/62 98.2 °F (36.8 °C) 67 16 94 % -   10/02/17 2000 106/70 98.3 °F (36.8 °C) 60 18 93 % -   10/02/17 1524 103/65 98.7 °F (37.1 °C) 67 18 96 % -   10/02/17 1143 92/58 98.2 °F (36.8 °C) 68 18 95 % -       Labs:  Recent Labs      10/03/17   0409   10/01/17   0547   WBC   --    --   5.6   HGB   --    --   10.3*   PLT   --    --   310   NA  141   < >  142   K  4.1   < >  4.0   CL  107   < >  107   CO2  28   < >  28   BUN  15   < >  14   CREA  0.63   < >  0.61   GLU  93   < >  93    < > = values in this interval not displayed. Lab Results   Component Value Date/Time    WBC 5.6 10/01/2017 05:47 AM    HGB 10.3 10/01/2017 05:47 AM    PLATELET 967 99/06/1050 05:47 AM    Sodium 141 10/03/2017 04:09 AM    Potassium 4.1 10/03/2017 04:09 AM    Chloride 107 10/03/2017 04:09 AM    CO2 28 10/03/2017 04:09 AM    BUN 15 10/03/2017 04:09 AM    Creatinine 0.63 10/03/2017 04:09 AM    Glucose 93 10/03/2017 04:09 AM    Bilirubin, total 0.2 09/25/2017 04:43 AM    AST (SGOT) 34 09/25/2017 04:43 AM    ALT (SGPT) 38 09/25/2017 04:43 AM    Alk. phosphatase 92 09/25/2017 04:43 AM       I reviewed recent labs and recent radiologic studies. I independently reviewed radiology images for studies I described above or studies I have ordered.    Admission date (for inpatients): 9/18/2017   * No surgery found *  Procedure(s):  LAPAROTOMY EXPLORATORY POSS BOWEL RESECTION/ ROOM 236    ASSESSMENT/PLAN:  Problem List  Date Reviewed: 10/2/2017          Codes Class Noted    Massive Left Retroperitoneal fatty mass seen on CT scan extending from kidney to groin ICD-10-CM: R19.00  ICD-9-CM: 789.30  9/22/2017        Acute cystitis ICD-10-CM: N30.00  ICD-9-CM: 595.0  9/19/2017        RLQ abdominal mass ICD-10-CM: R19.03  ICD-9-CM: 789.33  9/19/2017        RLQ abdominal pain ICD-10-CM: R10.31  ICD-9-CM: 789.03  9/19/2017        Leukocytosis ICD-10-CM: H01.945  ICD-9-CM: 288.60  9/19/2017        Non-intractable vomiting with nausea ICD-10-CM: R11.2  ICD-9-CM: 787.01  9/19/2017        * (Principal)Intra-abdominal infection ICD-10-CM: B99.9  ICD-9-CM: 136.9  9/18/2017        Anxiety state, unspecified ICD-10-CM: F41.1  ICD-9-CM: 300.00  Unknown        GERD (gastroesophageal reflux disease) ICD-10-CM: K21.9  ICD-9-CM: 530.81  Unknown        Hypercholesterolemia ICD-10-CM: E78.00  ICD-9-CM: 272.0  Unknown            Principal Problem:    Intra-abdominal infection (9/18/2017)    Active Problems:    Acute cystitis (9/19/2017)      RLQ abdominal mass (9/19/2017)      RLQ abdominal pain (9/19/2017)      Leukocytosis (9/19/2017)      Non-intractable vomiting with nausea (9/19/2017)      Massive Left Retroperitoneal fatty mass seen on CT scan extending from kidney to groin (9/22/2017)       RLQ phlegmon involving bowel with RLQ pain, N/V and delayed presentation and 1 month prodrome  No change according to radiology. When I compare recent Ct to the 1st Ct I see improvement but clearly nowhere near resolution.   We decided to proceed with expl laparotomy and possible bowel resection/possible appendectomy  NPO/Bowel rest, TPN via PICC  IV Zosyn         Differential Dx  Ruptured (delayed presentation) appendicitis with RLQ phelgmon  Malignancy (carcinoma, sarcoma, ovarian carcinoma)  Epiploicae Appendagitis  IBD  Diverticulitis  Ischemic Enteritis  Infectious Enteritis      In addition, there is also a left retroperitoneal infiltrative fatty mass-->rule out liposarcoma  Further workup for this on hold until we get resolution/stability of the RLQ inflammatory process  CEA and  were normal          Adult diaper for urinary incontinence - Pt couldn't make it to [facility] and wet bed  IV narcotic prn pain  IV Zofran/phenergan for nausea prn  GI (pepcid- add to TPN) and VTE (lovenox) prophylaxis ordered

## 2017-10-04 PROBLEM — K63.89 MASS OF CECUM: Status: ACTIVE | Noted: 2017-10-04

## 2017-10-04 PROBLEM — L02.91 PHLEGMON: Status: ACTIVE | Noted: 2017-10-04

## 2017-10-04 PROBLEM — K65.9 LOCALIZED PERITONITIS (HCC): Status: ACTIVE | Noted: 2017-10-04

## 2017-10-04 LAB
ALBUMIN SERPL-MCNC: 2.9 G/DL (ref 3.2–4.6)
ALBUMIN/GLOB SERPL: 0.7 {RATIO} (ref 1.2–3.5)
ALP SERPL-CCNC: 75 U/L (ref 50–136)
ALT SERPL-CCNC: 27 U/L (ref 12–65)
ANION GAP SERPL CALC-SCNC: 8 MMOL/L (ref 7–16)
AST SERPL-CCNC: 19 U/L (ref 15–37)
BILIRUB SERPL-MCNC: 0.2 MG/DL (ref 0.2–1.1)
BUN SERPL-MCNC: 15 MG/DL (ref 8–23)
CALCIUM SERPL-MCNC: 8.5 MG/DL (ref 8.3–10.4)
CHLORIDE SERPL-SCNC: 105 MMOL/L (ref 98–107)
CO2 SERPL-SCNC: 25 MMOL/L (ref 21–32)
CREAT SERPL-MCNC: 0.62 MG/DL (ref 0.6–1)
ERYTHROCYTE [DISTWIDTH] IN BLOOD BY AUTOMATED COUNT: 16.9 % (ref 11.9–14.6)
GLOBULIN SER CALC-MCNC: 4.3 G/DL (ref 2.3–3.5)
GLUCOSE SERPL-MCNC: 173 MG/DL (ref 65–100)
HCT VFR BLD AUTO: 35.8 % (ref 35.8–46.3)
HGB BLD-MCNC: 11 G/DL (ref 11.7–15.4)
MAGNESIUM SERPL-MCNC: 1.9 MG/DL (ref 1.8–2.4)
MCH RBC QN AUTO: 25.1 PG (ref 26.1–32.9)
MCHC RBC AUTO-ENTMCNC: 30.7 G/DL (ref 31.4–35)
MCV RBC AUTO: 81.5 FL (ref 79.6–97.8)
PHOSPHATE SERPL-MCNC: 1.9 MG/DL (ref 2.3–3.7)
PLATELET # BLD AUTO: 342 K/UL (ref 150–450)
PMV BLD AUTO: 10.8 FL (ref 10.8–14.1)
POTASSIUM SERPL-SCNC: 4.3 MMOL/L (ref 3.5–5.1)
PROT SERPL-MCNC: 7.2 G/DL (ref 6.3–8.2)
RBC # BLD AUTO: 4.39 M/UL (ref 4.05–5.25)
SODIUM SERPL-SCNC: 138 MMOL/L (ref 136–145)
WBC # BLD AUTO: 12.8 K/UL (ref 4.3–11.1)

## 2017-10-04 PROCEDURE — 74011000250 HC RX REV CODE- 250: Performed by: SURGERY

## 2017-10-04 PROCEDURE — 65270000029 HC RM PRIVATE

## 2017-10-04 PROCEDURE — 74011000258 HC RX REV CODE- 258: Performed by: SURGERY

## 2017-10-04 PROCEDURE — 84100 ASSAY OF PHOSPHORUS: CPT | Performed by: SURGERY

## 2017-10-04 PROCEDURE — 97164 PT RE-EVAL EST PLAN CARE: CPT

## 2017-10-04 PROCEDURE — 85027 COMPLETE CBC AUTOMATED: CPT | Performed by: SURGERY

## 2017-10-04 PROCEDURE — 83735 ASSAY OF MAGNESIUM: CPT | Performed by: SURGERY

## 2017-10-04 PROCEDURE — 94760 N-INVAS EAR/PLS OXIMETRY 1: CPT

## 2017-10-04 PROCEDURE — 36592 COLLECT BLOOD FROM PICC: CPT

## 2017-10-04 PROCEDURE — 74011250636 HC RX REV CODE- 250/636: Performed by: SURGERY

## 2017-10-04 PROCEDURE — 77010033678 HC OXYGEN DAILY

## 2017-10-04 PROCEDURE — 80053 COMPREHEN METABOLIC PANEL: CPT | Performed by: SURGERY

## 2017-10-04 RX ORDER — DIPHENHYDRAMINE HYDROCHLORIDE 50 MG/ML
25 INJECTION, SOLUTION INTRAMUSCULAR; INTRAVENOUS
Status: DISCONTINUED | OUTPATIENT
Start: 2017-10-04 | End: 2017-10-10 | Stop reason: HOSPADM

## 2017-10-04 RX ADMIN — Medication 600 UNITS: at 16:33

## 2017-10-04 RX ADMIN — PIPERACILLIN SODIUM,TAZOBACTAM SODIUM 3.38 G: 3; .375 INJECTION, POWDER, FOR SOLUTION INTRAVENOUS at 22:48

## 2017-10-04 RX ADMIN — DIPHENHYDRAMINE HYDROCHLORIDE 25 MG: 50 INJECTION, SOLUTION INTRAMUSCULAR; INTRAVENOUS at 16:32

## 2017-10-04 RX ADMIN — PIPERACILLIN SODIUM,TAZOBACTAM SODIUM 3.38 G: 3; .375 INJECTION, POWDER, FOR SOLUTION INTRAVENOUS at 06:39

## 2017-10-04 RX ADMIN — ENOXAPARIN SODIUM 40 MG: 40 INJECTION SUBCUTANEOUS at 19:49

## 2017-10-04 RX ADMIN — SOYBEAN OIL 250 ML: 20 INJECTION, SOLUTION INTRAVENOUS at 18:47

## 2017-10-04 RX ADMIN — Medication 10 ML: at 05:26

## 2017-10-04 RX ADMIN — ENOXAPARIN SODIUM 40 MG: 40 INJECTION SUBCUTANEOUS at 07:59

## 2017-10-04 RX ADMIN — POTASSIUM CHLORIDE: 2 INJECTION, SOLUTION, CONCENTRATE INTRAVENOUS at 18:47

## 2017-10-04 RX ADMIN — PIPERACILLIN SODIUM,TAZOBACTAM SODIUM 3.38 G: 3; .375 INJECTION, POWDER, FOR SOLUTION INTRAVENOUS at 16:33

## 2017-10-04 RX ADMIN — SODIUM CHLORIDE: 900 INJECTION, SOLUTION INTRAVENOUS at 07:59

## 2017-10-04 NOTE — PROGRESS NOTES
H&P/Consult Note/Progress Note/Office Note:   James Sellers  MRN: [id number]  :1940  Age:76 y.o.    HPI: Verdell Mcburney E 37 y. o. female who is s/p peritoneal drainage and right hemicolectomy for a RLQ phlegmon involving bowel and cecal mass. She also had biopsy of a left retroperitoneal fatty tumor noted in pre-op CT. This was done after about 2 weeks of bowel rest IV Abx, and TPN failed to improve the RLQ phlegmon on serial CT imaging      Prior to surgery she originally presented to and was admitted form the ER with a 1 month h/o severe (8/10) RLQ pain.    This was progressively worsening and associated with intermittent N/V. Nothing made it better or worse. She was treated previously with laxatives without improvement. She came the [facility] ER about 5 days prior to admission and was treated with antibiotics for UTI.  No imaging was done. She came back to the ER on the day of admission and CT scan was obtained as shown below. She is s/p lap francois at [facility] in approx .    She is s/p hysterectomy about 40 yrs ago for benign fibroids. Hal Phan thinks her ovaries remain. She denies associated bloody BMs or diarrhea.            Results for Jorge Watt (MRN [id number]) as of 2017 09:35     Ref. Range 2017 16:42   CEA Latest Ref Range: 0.0 - 3.0 ng/mL 0.8   CA-125 Latest Ref Range: 1.5 - 35.0 U/mL 5           17 CT abd/pelvis with contrast with IV but not oral contrast   Minimal linear atelectasis or scarring is present in the lung bases, left greater than right. There is a small to moderate-sized hiatal hernia. No pleural or pericardial fluid.      Small hypoattenuating lesions are present in the right hepatic lobe and left kidney, too small to definitively characterize but statistically likely  representing cysts. There is central biliary ductal ectasia s/p cholecystectomy.      The remaining solid abdominal viscera are unremarkable to the limits of  noncontrast CT.  Atherosclerotic abdominal aorta and branch vessels. No upper  abdominal no retroperitoneal lymph node enlargement. Nonopacified small bowel  loops are normal in caliber.      Pelvis: There is somewhat prominent lipomatosis along the left posterior  pararenal fascia and iliopsoas without associated soft tissue component,  measuring up to 12.0 x 23 x 9 cm. The bladder is predominantly decompressed. Uterus not visualized.      Extensive diverticular changes are present in the colon which is otherwise  normal in caliber. There is a large ill-defined inflammatory process in the  right lower quadrant measuring 5.9 x 10.0 x 6.4 cm approximating the ileocecal  junction from which the appendix is not visualized as a distinctly separate  structure. Multiple high attenuation foci are present within or adjacent to the  bowel, suspicious for medication and/or enteroliths. There is moderate adjacent  inflammatory fat stranding and multiple prominent right lower quadrant  mesenteric lymph nodes, the largest of which measures 1.2 cm in short axis.      Trace dependent free pelvic fluid. No aggressive osseous lesions.      IMPRESSION:  Nonspecific inflammatory process in RLQ at the ileocecal junction. Diff Dx include infectious, and ischemic colitis, appendicitis, inflammatory bowel disease, omental infarct.       Large extraperitoneal predominantly fatty attenuation mass without definite enhancing soft tissue. Size places the patient at risk for sarcoma, the inferior extent of which is not included in the field of view.        9/25/17 CT abd/pelvis with oral and IV contrast   Tiny hypodense lesion near the dome of the liver and another seen within the right posterior lobe. These are too small to further characterize. Otherwise, the liver and spleen enhances homogeneously without discrete lesions. There is no biliary ductal dilatation. Clips are present from prior cholecystectomy.  The pancreas and adrenal glands are normal. The kidneys  enhance symmetrically. Bowel loops in the upper abdomen are normal. No definite upper abdominal lymphadenopathy seen.     The RLQ phlegmon seen previously is again noted with significant bowel wall thickening involving the cecum and distal ileum. There is adjacent fat stranding. The large extraperitoneal fatty mass discussed previously is again noted and is unchanged. The bladder and rectum are normal.  No pelvic adenopathy seen. No free air or free fluid seen within the pelvis.       IMPRESSION: No significant interval change when compared with the prior study. 10/2/17 CT abd/pelvis with oral and IV contrast  There is scarring within the lingula with mild bibasilar atelectasis. There is a hiatal hernia present.     CT abdomen: The liver and spleen enhances homogeneously without discrete lesions. There is no biliary ductal dilatation. Clips are present from prior cholecystectomy. The pancreas and adrenal glands are normal. The kidneys enhance  symmetrically. Bowel loops in the upper abdomen are normal. No definite upper abdominal lymphadenopathy seen.     CT pelvis: No change in the appearance of the large fat density mass within the retroperitoneum on the left. In addition, the right lower quadrant phlegmon with bowel wall thickening is again seen and is unchanged. There is extensive  diverticulosis. The bladder and rectum are normal. No pelvic adenopathy seen. No free air or free fluid seen within the pelvis.    Bone window evaluation demonstrates no aggressive osseous lesions.     IMPRESSION: No significant interval change when compared with the prior study.           9/19/17 Abd pain unchanged; Mass-effect in RLQ; PICC requested; on IV ABx; NPO  9/20/17 PICC placed yesterday; on TPN; Nausea persists;  RLQ pain no better since admission and still 8/10  Requests adult diapers secondary to urinary incontinence  9/21/17 says her pain is 8/10 but looks comfortable  9/22/17 pain issues improving  9/23/17 Awake in bed. No new complaints. 9/24/17 Resting in bed. No new complaints. Family at bedside.   9/25/17 Overall improvement in RLQ pain compared to admission  9/26/17 reports RLQ abd pain better since admission  9/27/17 no changes  9/28/17 she feels like RX starting to work with less RLQ pain  9/29/17 slow but steady improvement clinically; still with RLQ pain  9/30/17 RLQ pain better compared to admission  10/1/17 she reports pain 4/10 for only about 5hrs total during the day compared to 8/10 pain 24hrs a day at admission  10/2/17 worsening  Pain now is suprapubic region and LLQ; follow-up CT pending  10/3/17 CT images reviewed, decided to proceed with laparotomy    10/4/17 POD1; doing OK; no complaints            Past Medical History:   Diagnosis Date    Anxiety state, unspecified     GERD (gastroesophageal reflux disease)     Hypercholesterolemia      Past Surgical History:   Procedure Laterality Date    HX CHOLECYSTECTOMY      HX COLONOSCOPY      Dr Painter Area 2012, repeat 5 years    HX GYN      hysterectomy, partial    HX KNEE REPLACEMENT      right     Current Facility-Administered Medications   Medication Dose Route Frequency    sodium chloride (NS) flush 5-10 mL  5-10 mL IntraVENous Q8H    sodium chloride (NS) flush 5-10 mL  5-10 mL IntraVENous PRN    lactated Ringers infusion  25 mL/hr IntraVENous CONTINUOUS    HYDROmorphone (DILAUDID) 20 mg / 20 mL PCA   IntraVENous CONTINUOUS    TPN ADULT - dextrose 10% amino acid 4.25%   IntraVENous QPM    citalopram (CELEXA) tablet 10 mg  10 mg Oral DAILY    enoxaparin (LOVENOX) injection 40 mg  40 mg SubCUTAneous Q12H    ondansetron (ZOFRAN) injection 4 mg  4 mg IntraVENous Q4H PRN    promethazine (PHENERGAN) with saline injection 12.5 mg  12.5 mg IntraVENous Q4H PRN    NUTRITIONAL SUPPORT ELECTROLYTE PRN ORDERS   Does Not Apply PRN    fat emulsion 20% (LIPOSYN, INTRALIPID) infusion 250 mL  250 mL IntraVENous QPM    heparin (porcine) pf 600 Units  600 Units InterCATHeter Q8H    piperacillin-tazobactam (ZOSYN) 3.375 g in 0.9% sodium chloride (MBP/ADV) 100 mL  3.375 g IntraVENous Q8H     Morphine  Social History     Social History    Marital status:      Spouse name: N/A    Number of children: N/A    Years of education: N/A     Social History Main Topics    Smoking status: Former Smoker     Quit date: 1/1/1975    Smokeless tobacco: Never Used    Alcohol use No    Drug use: No    Sexual activity: Not Asked     Other Topics Concern    None     Social History Narrative     History   Smoking Status    Former Smoker    Quit date: 1/1/1975   Smokeless Tobacco    Never Used     Family History   Problem Relation Age of Onset    Diabetes Mother     Heart Disease Mother     Thyroid Disease Mother     Heart Disease Father      ROS:  Comprehensive review of systems was otherwise unremarkable except as noted above. Physical Exam:   Visit Vitals    /73    Pulse 98    Temp 98.6 °F (37 °C)    Resp 16    Ht 5' 4\" (1.626 m)    Wt 243 lb (110.2 kg)    SpO2 94%    BMI 41.71 kg/m2     Constitutional: Alert, oriented, cooperative patient in no acute distress; appears stated age    Eyes:Sclera are clear. EOMs intact  ENMT: no external lesions gross hearing normal; no obvious neck masses, no ear or lip lesions, nares normal  CV: RRR. Normal perfusion  Resp: No JVD.  Breathing is  non-labored; no audible wheezing.    GI: obese; dry midline incisional dressing;  RLQ and pelvic STELLA drain is serosang  Torres with light yellow urine  Musculoskeletal: unremarkable with normal function. No embolic signs or cyanosis.    Neuro:  Oriented; moves all 4; no focal deficits  Psychiatric: normal affect and mood, no memory impairment    Recent vitals (if inpt):  Patient Vitals for the past 24 hrs:   BP Temp Pulse Resp SpO2 Weight   10/04/17 0338 - - - - - 243 lb (110.2 kg)   10/04/17 0300 132/73 98.6 °F (37 °C) 98 16 94 % -   10/03/17 2300 126/73 98.4 °F (36.9 °C) 91 16 95 % -   10/03/17 1900 132/76 98 °F (36.7 °C) 86 16 96 % -   10/03/17 1853 140/64 - 86 16 92 % -   10/03/17 1852 - - 86 14 98 % -   10/03/17 1848 154/70 98 °F (36.7 °C) 87 13 95 % -   10/03/17 1842 145/65 - 86 14 93 % -   10/03/17 1837 135/63 - 88 12 94 % -   10/03/17 1832 121/59 - 87 15 92 % -   10/03/17 1828 146/64 - 88 13 93 % -   10/03/17 1823 154/66 - 91 15 94 % -   10/03/17 1818 142/68 - 91 14 93 % -   10/03/17 1813 164/70 - 92 14 93 % -   10/03/17 1812 182/75 - 91 13 92 % -   10/03/17 1808 182/75 - 91 16 92 % -   10/03/17 1803 171/70 - 91 15 93 % -   10/03/17 1758 179/77 - 89 15 95 % -   10/03/17 1754 - - 89 - 97 % -   10/03/17 1753 179/77 - 90 16 - -   10/03/17 1751 186/81 - 91 - 98 % -   10/03/17 1748 - - 93 - 94 % -   10/03/17 1747 186/81 97.8 °F (36.6 °C) 92 16 96 % -   10/03/17 1743 - - 94 - 95 % -   10/03/17 1305 165/65 97.5 °F (36.4 °C) 66 18 99 % -   10/03/17 1142 128/80 98.1 °F (36.7 °C) 63 18 97 % -   10/03/17 0712 143/72 98.4 °F (36.9 °C) 76 18 92 % -       Labs:  Recent Labs      10/04/17   0438   WBC  12.8*   HGB  11.0*   PLT  342   NA  138   K  4.3   CL  105   CO2  25   BUN  15   CREA  0.62   GLU  173*   TBILI  0.2   SGOT  19   ALT  27   AP  75       Lab Results   Component Value Date/Time    WBC 12.8 10/04/2017 04:38 AM    HGB 11.0 10/04/2017 04:38 AM    PLATELET 374 33/65/3178 04:38 AM    Sodium 138 10/04/2017 04:38 AM    Potassium 4.3 10/04/2017 04:38 AM    Chloride 105 10/04/2017 04:38 AM    CO2 25 10/04/2017 04:38 AM    BUN 15 10/04/2017 04:38 AM    Creatinine 0.62 10/04/2017 04:38 AM    Glucose 173 10/04/2017 04:38 AM    Bilirubin, total 0.2 10/04/2017 04:38 AM    AST (SGOT) 19 10/04/2017 04:38 AM    ALT (SGPT) 27 10/04/2017 04:38 AM    Alk. phosphatase 75 10/04/2017 04:38 AM       I reviewed recent labs and recent radiologic studies. I independently reviewed radiology images for studies I described above or studies I have ordered.    Admission date (for inpatients): 9/18/2017   * No surgery found *  Procedure(s):  EXPLORATORY LAPAROTOMY, EXCISION OF PERITONEAL NODULE, RIGHT COLECTOMY.     ASSESSMENT/PLAN:  Problem List  Date Reviewed: 10/3/2017          Codes Class Noted    RLQ abdominal phlegmon ICD-10-CM: L02.91  ICD-9-CM: 682.9  10/4/2017        Localized peritonitis ([address] ICD-10-CM: K65.9  ICD-9-CM: 567.9  10/4/2017        Mass of cecum ICD-10-CM: K63.9  ICD-9-CM: 569.9  10/4/2017        Massive Left Retroperitoneal fatty mass seen on CT scan extending from kidney to groin ICD-10-CM: R19.00  ICD-9-CM: 789.30  9/22/2017        Acute cystitis ICD-10-CM: N30.00  ICD-9-CM: 595.0  9/19/2017        RLQ abdominal mass ICD-10-CM: R19.03  ICD-9-CM: 789.33  9/19/2017        RLQ abdominal pain ICD-10-CM: R10.31  ICD-9-CM: 789.03  9/19/2017        Leukocytosis ICD-10-CM: D72.829  ICD-9-CM: 288.60  9/19/2017        Non-intractable vomiting with nausea ICD-10-CM: R11.2  ICD-9-CM: 787.01  9/19/2017        * (Principal)Intra-abdominal infection ICD-10-CM: B99.9  ICD-9-CM: 136.9  9/18/2017        Anxiety state, unspecified ICD-10-CM: F41.1  ICD-9-CM: 300.00  Unknown        GERD (gastroesophageal reflux disease) ICD-10-CM: K21.9  ICD-9-CM: 530.81  Unknown        Hypercholesterolemia ICD-10-CM: E78.00  ICD-9-CM: 272.0  Unknown            Principal Problem:    Intra-abdominal infection (9/18/2017)    Active Problems:    Acute cystitis (9/19/2017)      RLQ abdominal mass (9/19/2017)      RLQ abdominal pain (9/19/2017)      Leukocytosis (9/19/2017)      Non-intractable vomiting with nausea (9/19/2017)      Massive Left Retroperitoneal fatty mass seen on CT scan extending from kidney to groin (9/22/2017)      RLQ abdominal phlegmon (10/4/2017)      Localized peritonitis ([address] (10/4/2017)      Mass of cecum (10/4/2017)       NPO  NGT  Torres  OOB qday  Empty STELLA PRN      Differential Dx  Ruptured (delayed presentation) appendicitis with RLQ phelgmon  Malignancy (carcinoma, sarcoma, ovarian carcinoma)  Epiploicae Appendagitis  Ischemic Colitis       In addition, there is also a left retroperitoneal infiltrative fatty mass-->rule out liposarcoma  Await biopsy result  CEA and  were normal      GI (pepcid- add to TPN) and VTE (lovenox) prophylaxis ordered

## 2017-10-04 NOTE — PROGRESS NOTES
END OF SHIFT NOTE:    INTAKE/OUTPUT  10/03 0701 - 10/04 0700  In: 2505 [I.V.:2505]  Out: 1225 [Urine:780; Drains:70]  Voiding: NO  Catheter: YES  Drain:   Jimbo-Castañeda Drain 10/03/17 Right; Lower Abdomen (Active)   Site Assessment Clean, dry, & intact 10/4/2017  8:04 AM   Dressing Status Clean, dry, & intact 10/4/2017  8:04 AM   Status Draining 10/4/2017  8:04 AM   Drainage Color Serosanguinous 10/4/2017  8:04 AM   Output (ml) 30 ml 10/4/2017  3:48 PM       Nasogastric Tube 10/03/17 (Active)   Site Assessment Clean, dry, & intact 10/4/2017  8:39 AM   Dressing Status Clean, dry, & intact 10/4/2017  8:39 AM   G Port Status Intermittent Suction 10/4/2017  8:39 AM   External Insertion Hema (cms) 50 cms 10/4/2017  8:39 AM   Drainage Description Green 10/4/2017  8:39 AM   Drainage Chamber Level (ml) 450 ml 10/4/2017  6:58 PM   Output (ml) 250 ml 10/4/2017  6:58 PM               Flatus: Patient does not have flatus present. Stool:  0 occurrences. Characteristics:  Stool Assessment  Stool Color: Tan (Comment)  Stool Appearance: Loose  Stool Amount: Medium  Stool Source/Status: Rectum    Emesis: 0 occurrences. Characteristics:        VITAL SIGNS  Patient Vitals for the past 12 hrs:   Temp Pulse Resp BP SpO2   10/04/17 1548 97.6 °F (36.4 °C) 77 18 135/77 95 %   10/04/17 1106 98.2 °F (36.8 °C) 76 18 138/75 95 %   10/04/17 0839 - - - - 97 %       Pain Assessment  Pain Intensity 1: 9 (10/04/17 1855)  Pain Location 1: Abdomen  Pain Intervention(s) 1: Encouraged PCA  Patient Stated Pain Goal: 0    Ambulating  No, up in chair today. PT following. Shift report given to oncoming nurse at the bedside.     Antonino Arceo RN

## 2017-10-04 NOTE — PROGRESS NOTES
Problem: Mobility Impaired (Adult and Pediatric)  Goal: *Acute Goals and Plan of Care (Insert Text)  LTG:  (1.)Ms. Deborah Arrington will move from supine to sit and sit to supine in bed with STAND BY ASSIST within 7 day(s). (2.)Ms. Deborah Arrington will transfer from bed to chair and chair to bed with SUPERVISION using the least restrictive device within 7 day(s). (3.)Ms. Deborah Arrington will ambulate with SUPERVISION for 500 feet with the least restrictive device within 7 day(s). ________________________________________________________________________________________________      PHYSICAL THERAPY: RE-EVALUATION, PM 10/4/2017  INPATIENT: Hospital Day: 17  [place] / [place] / Product Type: Medicare /      NAME/AGE/GENDER: Shawna Call is a 68 y.o. female             PRIMARY DIAGNOSIS: infected mass  Intra-abdominal infection  INFLAMMATORY RIGHT LOWER QUADRANT MASS Intra-abdominal infection Intra-abdominal infection  Procedure(s) (LRB):  EXPLORATORY LAPAROTOMY, EXCISION OF PERITONEAL NODULE, RIGHT COLECTOMY. (N/A)  1 Day Post-Op  ICD-10: Treatment Diagnosis:       · Other abnormalities of gait and mobility (R26.89)   Precaution/Allergies:  Morphine       ASSESSMENT:      Ms. Deborah Arrington presents s/p above abdominal sx on 10/2/17. Upon re-evaluation, Ms. Viktoriya Rojas demonstrates functional mobility below baseline. She required Mod/Max Assist supine with HOB raised to seated at EOB via log roll. She was able to stand and ambulate 4 ft to the chair with CGA/Min Assist without AD but she is very unsteady and held onto the furniture to steady herself as well. She will benefit from skilled IP PT to address her deficits and maximize function during her hospital stay. This section established at most recent assessment   PROBLEM LIST (Impairments causing functional limitations):  1. Decreased Strength  2. Decreased Transfer Abilities  3. Decreased Ambulation Ability/Technique  4. Decreased Balance  5.  Decreased Activity Tolerance    INTERVENTIONS PLANNED: (Benefits and precautions of physical therapy have been discussed with the patient.)  1. Bed Mobility  2. Gait Training  3. Therapeutic Activites  4. Therapeutic Exercise/Strengthening  5. Transfer Training      TREATMENT PLAN: Frequency/Duration: 3 times a week for duration of hospital stay  Rehabilitation Potential For Stated Goals: GOOD      RECOMMENDED REHABILITATION/EQUIPMENT: (at time of discharge pending progress): Due to the probability of continued deficits (see above) this patient will not likely need continued skilled physical therapy after discharge. Equipment:   · None at this time                   HISTORY:   History of Present Injury/Illness (Reason for Referral):  See above for surgery  10/2/17  Past Medical History/Comorbidities:   Ms. Sana Shields  has a past medical history of Anxiety state, unspecified; GERD (gastroesophageal reflux disease); and Hypercholesterolemia. Ms. Sana Shields  has a past surgical history that includes knee replacement; gyn; cholecystectomy; and colonoscopy.   Social History/Living Environment:   Home Environment: Private residence  One/Two Story Residence: One story  Living Alone: No  Support Systems: Child(zeenat)  Patient Expects to be Discharged to[de-identified] Private residence  Current DME Used/Available at Home: None  Prior Level of Function/Work/Activity:  Fully independent with all mobility      Number of Personal Factors/Comorbidities that affect the Plan of Care: 1-2: MODERATE COMPLEXITY   EXAMINATION:   Most Recent Physical Functioning:   Gross Assessment:  Strength: Generally decreased, functional (BLEs)               Posture:     Balance:  Sitting: Impaired  Sitting - Static: Fair (occasional)  Sitting - Dynamic: Fair (occasional)  Standing: Impaired  Standing - Static: Fair  Standing - Dynamic : Fair Bed Mobility:  Supine to Sit: Moderate assistance;Maximum assistance;Assist x1  Wheelchair Mobility:     Transfers:  Sit to Stand: Contact guard assistance  Stand to Sit: Contact guard assistance  Bed to Chair: Contact guard assistance;Minimum assistance  Gait:     Base of Support: Widened  Speed/Bhumika: Slow  Gait Abnormalities: Decreased step clearance; Path deviations  Distance (ft): 4 Feet (ft)  Assistive Device: Other (comment) (None)  Ambulation - Level of Assistance: Contact guard assistance;Minimal assistance       Body Structures Involved:  1. Digestive Structures  2. Muscles Body Functions Affected:  1. Movement Related  2. Digestive Activities and Participation Affected:  1. Mobility  2. Self Care   Number of elements that affect the Plan of Care: 4+: HIGH COMPLEXITY   CLINICAL PRESENTATION:   Presentation: Evolving clinical presentation with changing clinical characteristics: MODERATE COMPLEXITY   CLINICAL DECISION MAKIN [facility] Inpatient Short Form  How much difficulty does the patient currently have. .. Unable A Lot A Little None   1. Turning over in bed (including adjusting bedclothes, sheets and blankets)? [ ] 1   [X] 2   [ ] 3   [ ] 4   2. Sitting down on and standing up from a chair with arms ( e.g., wheelchair, bedside commode, etc.)   [ ] 1   [ ] 2   [X] 3   [ ] 4   3. Moving from lying on back to sitting on the side of the bed? [ ] 1   [X] 2   [ ] 3   [ ] 4   How much help from another person does the patient currently need. .. Total A Lot A Little None   4. Moving to and from a bed to a chair (including a wheelchair)? [ ] 1   [X] 2   [ ] 3   [ ] 4   5. Need to walk in hospital room? [ ] 1   [X] 2   [ ] 3   [ ] 4   6. Climbing 3-5 steps with a railing? [ ] 1   [X] 2   [ ] 3   [ ] 4   © 2007, Trustees of [address] [place] [address] [address] [address], under license to [employer]. All rights reserved    Score:  Initial: 13 Most Recent: X (Date: -- )     Interpretation of Tool:  Represents activities that are increasingly more difficult (i.e. Bed mobility, Transfers, Gait).        Score 24 23 22-20 19-15 14-10 9-7 6       Modifier CH CI CJ CK CL CM CN         · Mobility - Walking and Moving Around:               - CURRENT STATUS:    CL - 60%-79% impaired, limited or restricted               - GOAL STATUS:           CK - 40%-59% impaired, limited or restricted               - D/C STATUS:                       ---------------To be determined---------------  [place] / [place] / Product Type: Medicare /       Medical Necessity:     · Skilled intervention continues to be required due to impaired functional mobility. Reason for Services/Other Comments:  · Patient continues to require skilled intervention due to impaired bed mobility, transfers and ambulation, decreased strength. Use of outcome tool(s) and clinical judgement create a POC that gives a: Questionable prediction of patient's progress: MODERATE COMPLEXITY                 TREATMENT:   (In addition to Assessment/Re-Assessment sessions the following treatments were rendered)   Pre-treatment Symptoms/Complaints:  Pt is agreeable to participate in PT. Pain: Initial:   Pain Intensity 1: 2  Pain Location 1: Abdomen  Post Session:  No change      Assessment/Reassessment only, no treatment provided today     Braces/Orthotics/Lines/Etc:   · IV  · snyder catheter  · drain HP  · nasogastric tube  · O2 Device: Nasal cannula  Treatment/Session Assessment:    · Response to Treatment:  Resting comfortably in chair  · Interdisciplinary Collaboration:  · Physical Therapist  · Registered Nurse  · After treatment position/precautions:  · Up in chair  · Bed/Chair-wheels locked  · Bed in low position  · Call light within reach  · RN notified  · Family at bedside  · Compliance with Program/Exercises: Will assess as treatment progresses. · Recommendations/Intent for next treatment session: \"Next visit will focus on advancements to more challenging activities and reduction in assistance provided\".   Total Treatment Duration:  PT Patient Time In/Time Out  Time In: 1526  Time Out: 4374 Derick Garcia, PT

## 2017-10-04 NOTE — PROGRESS NOTES
Patient alert and oriented x3. Resting in bed, c/o abd pain 7/10, encouraged use of PCA, verbalized understanding.

## 2017-10-04 NOTE — PROGRESS NOTES
Problem: Nutrition Deficit  Goal: *Optimize nutritional status  Nutrition F/U   TPN management (Dr. Rell Lu)   Assessment:   · Diet order(s): 9-18: NPO  · Central line access: Double lumen PICC  · TPN dependent S/P excision of peritoneal node, right colectomy and drainage of peritonitis (10-4)  · Phos 1.9-current TPN provides ~10 meq/d. 15 mmole Phos bolus ordered by Dr Rell Lu this AM. May benefit from increased Phos in TPN and additional Phos bolus  · Cl 105, Na 138-NaCll decreased in TPN from 75 meq NaCl/L to 30 meq/L on 10-2. Pt is receiving significant NaCl from IVATB, Zosyn. · BMP glucose 173 mg/dl-likely r/t post-op surgical stress. Would hold off on POC Glucoses and SSI until BMP glucose > 180 mg/dl   · Last 3 Recorded Weights in this Encounter   ·  · 10/02/17 0605 · 10/03/17 0437 · 10/04/17 0338   · Weight: · 105.2 kg (232 lb) · 104.3 kg (230 lb) · 110.2 kg (243 lb)   · No noted edema  · Macronutrient needs:  EER: 6479-4846 kcal /day (13-18 kcal/kg actual BW)   EPR: 65-82 grams protein/day (1.2-1.5 grams/kg IBW). Dr Lsibet Lira standard request is 2 gm/kg ZON=220 g PRO/d)   Max CHO: 314 grams/day (4mg/kg IBW/min)   Fluid: 1ml/kcal   Intake/Comparative Standards: 2L/d of 10%DEX/ 4.25%AA at 85 ml/hr with 250 ml 20% Lipids daily provides 1520 kcal/d (100% of needs), 85 grams of protein/d (1.56 grams of protein/kg of IBW), 200 grams of CHO/d (does not exceed maximum CHO load) and ~2300 ml of total volume/d (100% of needs). IVF of LR at 80 ml/hr provides an additional 1920 ml of volume/d  Intervention:   Meals and snacks: NPO   Nutritional Supplement Therapy: Electrolyte replacement per nutritional support protocols are active on MAR. Coordination of nutrition care: Discussed with Colleen Rangel RN  PN:   1. Increase Phos to 20 meq/L or ~40 meq/d  2. IVF per Dr Rell Lu  3.  If 2 grams protein/kg is desired for this pt, this could be met with 10%DEX/ 4.25%AA at 105 ml/hr with 250 ml 20% Lipids daily provides 33873 98 41 13 kcal/d (100% of needs), 107 grams of protein/d (1.96 grams of protein/kg of IBW), 252 grams of CHO/d (does not exceed maximum CHO load) and ~2770 ml of total volume/d (>100% of needs) vs 2L/d of 15%DEX/5%AA at 85 ml/hr with 250 ml 20% Lipids daily provides 1920 kcal/d (85% of needs), 100 grams of protein/d (1.83 grams of protein/kg of IBW),300 grams of CHO/d (does not exceed maximum CHO load) and ~2300 ml of total volume/d (>100% of needs)  4.  Add POC glucoses and SSI if BMP glucose > 180 mg/dl  David Colin, RD, LD, [facility] [phone]

## 2017-10-04 NOTE — ROUTINE PROCESS
END OF SHIFT NOTE:    INTAKE/OUTPUT  10/03 0701 - 10/04 0700  In: 2505 [I.V.:2505]  Out: 1225 [Urine:780; Drains:70]  Voiding: YES  Catheter: YES  Drain:   Jimbo-Castañeda Drain 10/03/17 Right; Lower Abdomen (Active)   Site Assessment Clean, dry, & intact 10/4/2017 12:33 AM   Dressing Status Clean, dry, & intact 10/4/2017 12:33 AM   Status Patent; Charged;Draining;Suction (specify 10/4/2017 12:33 AM   Drainage Color Serosanguinous 10/4/2017 12:33 AM   Output (ml) 20 ml 10/4/2017  3:02 AM       Nasogastric Tube 10/03/17 (Active)   Site Assessment Clean, dry, & intact 10/4/2017 12:33 AM   Dressing Status Clean, dry, & intact 10/4/2017 12:33 AM   G Port Status Intermittent Suction 10/4/2017 12:33 AM   External Insertion Hema (cms) 50 cms 10/4/2017 12:33 AM   Drainage Description Green 10/4/2017 12:33 AM   Drainage Chamber Level (ml) 200 ml 10/4/2017  6:58 AM   Output (ml) 100 ml 10/4/2017  6:58 AM               Flatus: Patient does not have flatus present. Stool:  0 occurrences. Characteristics:  Stool Assessment  Stool Color: Tan (Comment)  Stool Appearance: Loose  Stool Amount: Medium  Stool Source/Status: Rectum    Emesis: 0 occurrences. Characteristics:        VITAL SIGNS  Patient Vitals for the past 12 hrs:   Temp Pulse Resp BP SpO2   10/04/17 0300 98.6 °F (37 °C) 98 16 132/73 94 %   10/03/17 2300 98.4 °F (36.9 °C) 91 16 126/73 95 %       Pain Assessment  Pain Intensity 1: 9 (10/04/17 0658)  Pain Location 1: Abdomen  Pain Intervention(s) 1: Encouraged PCA  Patient Stated Pain Goal: 0    Ambulating  No    Shift report given to oncoming nurse at the bedside.     Adi Herbert LPN

## 2017-10-04 NOTE — ROUTINE PROCESS
Accidentally pulled out NGT. Reinserted left nare 1st attempt without difficulty. Tolerated well. Will continue to monitor. Daughter at bedside.

## 2017-10-04 NOTE — PROGRESS NOTES
Problem: Falls - Risk of  Goal: *Absence of Falls  Document Gia Fall Risk and appropriate interventions in the flowsheet.    Outcome: Progressing Towards Goal  Fall Risk Interventions:  Mobility Interventions: Communicate number of staff needed for ambulation/transfer, Patient to call before getting OOB, PT Consult for mobility concerns           Medication Interventions: Patient to call before getting OOB, Teach patient to arise slowly     Elimination Interventions: Call light in reach, Patient to call for help with toileting needs, Toilet paper/wipes in reach

## 2017-10-05 LAB
ALBUMIN SERPL-MCNC: 2.5 G/DL (ref 3.2–4.6)
ALBUMIN/GLOB SERPL: 0.6 {RATIO} (ref 1.2–3.5)
ALP SERPL-CCNC: 64 U/L (ref 50–136)
ALT SERPL-CCNC: 22 U/L (ref 12–65)
ANION GAP SERPL CALC-SCNC: 4 MMOL/L (ref 7–16)
AST SERPL-CCNC: 11 U/L (ref 15–37)
BILIRUB SERPL-MCNC: 0.2 MG/DL (ref 0.2–1.1)
BUN SERPL-MCNC: 18 MG/DL (ref 8–23)
CALCIUM SERPL-MCNC: 8.1 MG/DL (ref 8.3–10.4)
CHLORIDE SERPL-SCNC: 107 MMOL/L (ref 98–107)
CO2 SERPL-SCNC: 28 MMOL/L (ref 21–32)
CREAT SERPL-MCNC: 0.57 MG/DL (ref 0.6–1)
ERYTHROCYTE [DISTWIDTH] IN BLOOD BY AUTOMATED COUNT: 17.3 % (ref 11.9–14.6)
GLOBULIN SER CALC-MCNC: 3.9 G/DL (ref 2.3–3.5)
GLUCOSE SERPL-MCNC: 116 MG/DL (ref 65–100)
HCT VFR BLD AUTO: 31.6 % (ref 35.8–46.3)
HGB BLD-MCNC: 9.3 G/DL (ref 11.7–15.4)
MCH RBC QN AUTO: 24.9 PG (ref 26.1–32.9)
MCHC RBC AUTO-ENTMCNC: 29.4 G/DL (ref 31.4–35)
MCV RBC AUTO: 84.7 FL (ref 79.6–97.8)
PHOSPHATE SERPL-MCNC: 2 MG/DL (ref 2.3–3.7)
PLATELET # BLD AUTO: 286 K/UL (ref 150–450)
PMV BLD AUTO: 10.2 FL (ref 10.8–14.1)
POTASSIUM SERPL-SCNC: 4.4 MMOL/L (ref 3.5–5.1)
PROT SERPL-MCNC: 6.4 G/DL (ref 6.3–8.2)
RBC # BLD AUTO: 3.73 M/UL (ref 4.05–5.25)
SODIUM SERPL-SCNC: 139 MMOL/L (ref 136–145)
WBC # BLD AUTO: 9.4 K/UL (ref 4.3–11.1)

## 2017-10-05 PROCEDURE — 65270000029 HC RM PRIVATE

## 2017-10-05 PROCEDURE — 36592 COLLECT BLOOD FROM PICC: CPT

## 2017-10-05 PROCEDURE — 80053 COMPREHEN METABOLIC PANEL: CPT | Performed by: SURGERY

## 2017-10-05 PROCEDURE — 84100 ASSAY OF PHOSPHORUS: CPT | Performed by: SURGERY

## 2017-10-05 PROCEDURE — 74011000258 HC RX REV CODE- 258: Performed by: SURGERY

## 2017-10-05 PROCEDURE — 74011250636 HC RX REV CODE- 250/636: Performed by: SURGERY

## 2017-10-05 PROCEDURE — 85027 COMPLETE CBC AUTOMATED: CPT | Performed by: SURGERY

## 2017-10-05 PROCEDURE — 74011000250 HC RX REV CODE- 250: Performed by: SURGERY

## 2017-10-05 RX ADMIN — PIPERACILLIN SODIUM,TAZOBACTAM SODIUM 3.38 G: 3; .375 INJECTION, POWDER, FOR SOLUTION INTRAVENOUS at 23:30

## 2017-10-05 RX ADMIN — POTASSIUM CHLORIDE: 2 INJECTION, SOLUTION, CONCENTRATE INTRAVENOUS at 17:42

## 2017-10-05 RX ADMIN — SODIUM CHLORIDE: 900 INJECTION, SOLUTION INTRAVENOUS at 10:22

## 2017-10-05 RX ADMIN — Medication 600 UNITS: at 16:37

## 2017-10-05 RX ADMIN — PIPERACILLIN SODIUM,TAZOBACTAM SODIUM 3.38 G: 3; .375 INJECTION, POWDER, FOR SOLUTION INTRAVENOUS at 16:36

## 2017-10-05 RX ADMIN — SOYBEAN OIL 250 ML: 20 INJECTION, SOLUTION INTRAVENOUS at 17:37

## 2017-10-05 RX ADMIN — SODIUM CHLORIDE 12.5 MG: 9 INJECTION INTRAMUSCULAR; INTRAVENOUS; SUBCUTANEOUS at 17:38

## 2017-10-05 RX ADMIN — HYDROMORPHONE HYDROCHLORIDE: 2 INJECTION INTRAMUSCULAR; INTRAVENOUS; SUBCUTANEOUS at 12:32

## 2017-10-05 RX ADMIN — PIPERACILLIN SODIUM,TAZOBACTAM SODIUM 3.38 G: 3; .375 INJECTION, POWDER, FOR SOLUTION INTRAVENOUS at 06:38

## 2017-10-05 RX ADMIN — ENOXAPARIN SODIUM 40 MG: 40 INJECTION SUBCUTANEOUS at 19:42

## 2017-10-05 RX ADMIN — Medication 10 ML: at 04:50

## 2017-10-05 RX ADMIN — Medication 300 UNITS: at 04:51

## 2017-10-05 RX ADMIN — SODIUM CHLORIDE 12.5 MG: 9 INJECTION INTRAMUSCULAR; INTRAVENOUS; SUBCUTANEOUS at 20:55

## 2017-10-05 RX ADMIN — ENOXAPARIN SODIUM 40 MG: 40 INJECTION SUBCUTANEOUS at 08:39

## 2017-10-05 NOTE — PROGRESS NOTES
Problem: Nutrition Deficit  Goal: *Optimize nutritional status  Nutrition F/U   TPN management (Dr. Fabio Pollard)   Assessment:   · Diet order(s): 9-18: NPO  · Central line access: Double lumen PICC  · TPN dependent S/P excision of peritoneal node, right colectomy and drainage of peritonitis (10-4)  · Phos 2-slight improvement with 15 mmole bolus yesterday and Phos increased in TPN last night from ~10 meq/d to ~ 40 meq/d. Would hold off on increasing Phos inTPN and await response to change made ~10 hr prior to lab draw this AM .  · Cl 107, Na 139-NaCl decreased in TPN from 75 meq NaCl/L to 30 meq/L on 10-2. Pt is receiving significant NaCl from IVATB, Zosyn and additional Na from IVF of LR at 80 ml/hr  · BMP glucose 116 mg/dl-improved. likely r/t post-op surgical stress. Would hold off on POC Glucoses and SSI until BMP glucose > 180 mg/dl            Last 3 Recorded Weights in this Encounter   ·  · 10/02/17 0605 · 10/03/17 0437 · 10/04/17 0338   · Weight: · 105.2 kg (232 lb) · 104.3 kg (230 lb) · 110.2 kg (243 lb)   · No noted edema  · Macronutrient needs:  EER: 7675-6871 kcal /day (13-18 kcal/kg actual BW)   EPR: 65-82 grams protein/day (1.2-1.5 grams/kg IBW). Dr Anna Gill standard request is 2 gm/kg UNX=059 g PRO/d)   Max CHO: 314 grams/day (4mg/kg IBW/min)   Fluid: 1ml/kcal   Intake/Comparative Standards: 2L/d of 10%DEX/ 4.25%AA at 85 ml/hr with 250 ml 20% Lipids daily provides 1520 kcal/d (100% of needs), 85 grams of protein/d (1.56 grams of protein/kg of IBW), 200 grams of CHO/d (does not exceed maximum CHO load) and ~2300 ml of total volume/d (100% of needs). IVF of LR at 80 ml/hr provides an additional 1920 ml of volume/d   Intervention:   Meals and snacks: NPO   Nutritional Supplement Therapy: Electrolyte replacement per nutritional support protocols are active on MAR. Supplement Phos per protocols. Coordination of nutrition care: Discussed with Grace David RN   PN:   1. Continue current TPN  2.  Discontinue IVF  3. If 2 grams protein/kg is desired for this pt, this could be met with 10%DEX/ 4.25%AA at 105 ml/hr with 250 ml 20% Lipids daily provides 1785 kcal/d (100% of needs), 107 grams of protein/d (1.96 grams of protein/kg of IBW), 252 grams of CHO/d (does not exceed maximum CHO load) and ~2770 ml of total volume/d (>100% of needs) vs 2L/d of 15%DEX/5%AA at 85 ml/hr with 250 ml 20% Lipids daily provides 1920 kcal/d (85% of needs), 100 grams of protein/d (1.83 grams of protein/kg of IBW),300 grams of CHO/d (does not exceed maximum CHO load) and ~2300 ml of total volume/d (>100% of needs)  Melchor Synder, NILSON, LD, [facility] [phone]

## 2017-10-05 NOTE — PROGRESS NOTES
Dr. Roney Mitchell informed that patient had accidentally pulled out ng tube, okay to leave out. Patient instructed to call if she becomes nauseated.

## 2017-10-05 NOTE — PROGRESS NOTES
H&P/Consult Note/Progress Note/Office Note:   Jacque Naidu  MRN: [id number]  :1940  Age:76 y.o.    HPI: Puneet Hernandez U 57 y. o. female who is s/p peritoneal drainage and right hemicolectomy for a RLQ phlegmon involving bowel and cecal mass. She also had biopsy of a left retroperitoneal fatty tumor noted in pre-op CT. This was done after about 2 weeks of bowel rest IV Abx, and TPN failed to improve the RLQ phlegmon on serial CT imaging      Prior to surgery she originally presented to and was admitted form the ER with a 1 month h/o severe (8/10) RLQ pain.    This was progressively worsening and associated with intermittent N/V. Nothing made it better or worse. She was treated previously with laxatives without improvement. She came the [facility] ER about 5 days prior to admission and was treated with antibiotics for UTI.  No imaging was done. She came back to the ER on the day of admission and CT scan was obtained as shown below. She is s/p lap francois at [employer] [facility] [employer] in approx .    She is s/p hysterectomy about 40 yrs ago for benign fibroids. Esperanza Vinnie thinks her ovaries remain. She denies associated bloody BMs or diarrhea.            Results for Chiquis Mccrary (MRN [id number]) as of 2017 09:35     Ref. Range 2017 16:42   CEA Latest Ref Range: 0.0 - 3.0 ng/mL 0.8   CA-125 Latest Ref Range: 1.5 - 35.0 U/mL 5           17 CT abd/pelvis with contrast with IV but not oral contrast   Minimal linear atelectasis or scarring is present in the lung bases, left greater than right. There is a small to moderate-sized hiatal hernia. No pleural or pericardial fluid.      Small hypoattenuating lesions are present in the right hepatic lobe and left kidney, too small to definitively characterize but statistically likely  representing cysts. There is central biliary ductal ectasia s/p cholecystectomy.      The remaining solid abdominal viscera are unremarkable to the limits of  noncontrast CT.  Atherosclerotic abdominal aorta and branch vessels. No upper  abdominal no retroperitoneal lymph node enlargement. Nonopacified small bowel  loops are normal in caliber.      Pelvis: There is somewhat prominent lipomatosis along the left posterior  pararenal fascia and iliopsoas without associated soft tissue component,  measuring up to 12.0 x 23 x 9 cm. The bladder is predominantly decompressed. Uterus not visualized.      Extensive diverticular changes are present in the colon which is otherwise  normal in caliber. There is a large ill-defined inflammatory process in the  right lower quadrant measuring 5.9 x 10.0 x 6.4 cm approximating the ileocecal  junction from which the appendix is not visualized as a distinctly separate  structure. Multiple high attenuation foci are present within or adjacent to the  bowel, suspicious for medication and/or enteroliths. There is moderate adjacent  inflammatory fat stranding and multiple prominent right lower quadrant  mesenteric lymph nodes, the largest of which measures 1.2 cm in short axis.      Trace dependent free pelvic fluid. No aggressive osseous lesions.      IMPRESSION:  Nonspecific inflammatory process in RLQ at the ileocecal junction. Diff Dx include infectious, and ischemic colitis, appendicitis, inflammatory bowel disease, omental infarct.       Large extraperitoneal predominantly fatty attenuation mass without definite enhancing soft tissue. Size places the patient at risk for sarcoma, the inferior extent of which is not included in the field of view.        9/25/17 CT abd/pelvis with oral and IV contrast   Tiny hypodense lesion near the dome of the liver and another seen within the right posterior lobe. These are too small to further characterize. Otherwise, the liver and spleen enhances homogeneously without discrete lesions. There is no biliary ductal dilatation. Clips are present from prior cholecystectomy.  The pancreas and adrenal glands are normal. The kidneys  enhance symmetrically. Bowel loops in the upper abdomen are normal. No definite upper abdominal lymphadenopathy seen.     The RLQ phlegmon seen previously is again noted with significant bowel wall thickening involving the cecum and distal ileum. There is adjacent fat stranding. The large extraperitoneal fatty mass discussed previously is again noted and is unchanged. The bladder and rectum are normal.  No pelvic adenopathy seen. No free air or free fluid seen within the pelvis.       IMPRESSION: No significant interval change when compared with the prior study. 10/2/17 CT abd/pelvis with oral and IV contrast  There is scarring within the lingula with mild bibasilar atelectasis. There is a hiatal hernia present.     CT abdomen: The liver and spleen enhances homogeneously without discrete lesions. There is no biliary ductal dilatation. Clips are present from prior cholecystectomy. The pancreas and adrenal glands are normal. The kidneys enhance  symmetrically. Bowel loops in the upper abdomen are normal. No definite upper abdominal lymphadenopathy seen.     CT pelvis: No change in the appearance of the large fat density mass within the retroperitoneum on the left. In addition, the right lower quadrant phlegmon with bowel wall thickening is again seen and is unchanged. There is extensive  diverticulosis. The bladder and rectum are normal. No pelvic adenopathy seen. No free air or free fluid seen within the pelvis.    Bone window evaluation demonstrates no aggressive osseous lesions.     IMPRESSION: No significant interval change when compared with the prior study.           9/19/17 Abd pain unchanged; Mass-effect in RLQ; PICC requested; on IV ABx; NPO  9/20/17 PICC placed yesterday; on TPN; Nausea persists;  RLQ pain no better since admission and still 8/10  Requests adult diapers secondary to urinary incontinence  9/21/17 says her pain is 8/10 but looks comfortable  9/22/17 pain issues improving  9/23/17 Awake in bed. No new complaints. 9/24/17 Resting in bed. No new complaints. Family at bedside.   9/25/17 Overall improvement in RLQ pain compared to admission  9/26/17 reports RLQ abd pain better since admission  9/27/17 no changes  9/28/17 she feels like RX starting to work with less RLQ pain  9/29/17 slow but steady improvement clinically; still with RLQ pain  9/30/17 RLQ pain better compared to admission  10/1/17 she reports pain 4/10 for only about 5hrs total during the day compared to 8/10 pain 24hrs a day at admission  10/2/17 worsening  Pain now is suprapubic region and LLQ; follow-up CT pending  10/3/17 CT images reviewed, decided to proceed with laparotomy--->OR    10/4/17 POD1; doing OK; correct hypophosphatemia; resume TPN no complaints  10/5/17 POD2 WBC normal; she pulled out her NGT            Past Medical History:   Diagnosis Date    Anxiety state, unspecified     GERD (gastroesophageal reflux disease)     Hypercholesterolemia      Past Surgical History:   Procedure Laterality Date    HX CHOLECYSTECTOMY      HX COLONOSCOPY      Dr Sherwin Rutledge 2012, repeat 5 years    HX GYN      hysterectomy, partial    HX KNEE REPLACEMENT      right     Current Facility-Administered Medications   Medication Dose Route Frequency    TPN ADULT - dextrose 10% amino acid 4.25%   IntraVENous QPM    diphenhydrAMINE (BENADRYL) injection 25 mg  25 mg IntraVENous Q8H PRN    sodium chloride (NS) flush 5-10 mL  5-10 mL IntraVENous PRN    lactated Ringers infusion  80 mL/hr IntraVENous CONTINUOUS    HYDROmorphone (DILAUDID) 20 mg / 20 mL PCA   IntraVENous CONTINUOUS    enoxaparin (LOVENOX) injection 40 mg  40 mg SubCUTAneous Q12H    ondansetron (ZOFRAN) injection 4 mg  4 mg IntraVENous Q4H PRN    promethazine (PHENERGAN) with saline injection 12.5 mg  12.5 mg IntraVENous Q4H PRN    NUTRITIONAL SUPPORT ELECTROLYTE PRN ORDERS   Does Not Apply PRN    fat emulsion 20% (LIPOSYN, INTRALIPID) infusion 250 mL 250 mL IntraVENous QPM    heparin (porcine) pf 600 Units  600 Units InterCATHeter Q8H    piperacillin-tazobactam (ZOSYN) 3.375 g in 0.9% sodium chloride (MBP/ADV) 100 mL  3.375 g IntraVENous Q8H     Morphine  Social History     Social History    Marital status:      Spouse name: N/A    Number of children: N/A    Years of education: N/A     Social History Main Topics    Smoking status: Former Smoker     Quit date: 1/1/1975    Smokeless tobacco: Never Used    Alcohol use No    Drug use: No    Sexual activity: Not Asked     Other Topics Concern    None     Social History Narrative     History   Smoking Status    Former Smoker    Quit date: 1/1/1975   Smokeless Tobacco    Never Used     Family History   Problem Relation Age of Onset    Diabetes Mother     Heart Disease Mother     Thyroid Disease Mother     Heart Disease Father      ROS:  Comprehensive review of systems was otherwise unremarkable except as noted above. Physical Exam:   Visit Vitals    /68 (BP 1 Location: Left arm, BP Patient Position: At rest)    Pulse (!) 105    Temp 97.5 °F (36.4 °C)    Resp 18    Ht 5' 4\" (1.626 m)    Wt 242 lb 8.1 oz (110 kg)    SpO2 98%    BMI 41.63 kg/m2     Constitutional: Alert, oriented, cooperative patient in no acute distress; appears stated age    Eyes:Sclera are clear. EOMs intact  ENMT: no external lesions gross hearing normal; no obvious neck masses, no ear or lip lesions, nares normal  CV: RRR. Normal perfusion  Resp: No JVD.  Breathing is  non-labored; no audible wheezing.    GI: obese; dry midline incisional dressing;  RLQ and pelvic STELLA drain is serosang  Torres with light yellow urine  Musculoskeletal: unremarkable with normal function. No embolic signs or cyanosis.    Neuro:  Oriented; moves all 4; no focal deficits  Psychiatric: normal affect and mood, no memory impairment    Recent vitals (if inpt):  Patient Vitals for the past 24 hrs:   BP Temp Pulse Resp SpO2 Weight 10/05/17 0516 - - - - - 242 lb 8.1 oz (110 kg)   10/05/17 0400 121/68 97.5 °F (36.4 °C) (!) 105 18 98 % -   10/04/17 2343 129/70 99 °F (37.2 °C) 89 18 95 % -   10/04/17 1957 116/69 98.1 °F (36.7 °C) 79 18 97 % -   10/04/17 1548 135/77 97.6 °F (36.4 °C) 77 18 95 % -   10/04/17 1106 138/75 98.2 °F (36.8 °C) 76 18 95 % -   10/04/17 0839 - - - - 97 % -   10/04/17 0738 119/65 98.3 °F (36.8 °C) 88 18 93 % -       Labs:  Recent Labs      10/05/17   0438   WBC  9.4   HGB  9.3*   PLT  286   NA  139   K  4.4   CL  107   CO2  28   BUN  18   CREA  0.57*   GLU  116*   TBILI  0.2   SGOT  11*   ALT  22   AP  64       Lab Results   Component Value Date/Time    WBC 9.4 10/05/2017 04:38 AM    HGB 9.3 10/05/2017 04:38 AM    PLATELET 143 41/55/6212 04:38 AM    Sodium 139 10/05/2017 04:38 AM    Potassium 4.4 10/05/2017 04:38 AM    Chloride 107 10/05/2017 04:38 AM    CO2 28 10/05/2017 04:38 AM    BUN 18 10/05/2017 04:38 AM    Creatinine 0.57 10/05/2017 04:38 AM    Glucose 116 10/05/2017 04:38 AM    Bilirubin, total 0.2 10/05/2017 04:38 AM    AST (SGOT) 11 10/05/2017 04:38 AM    ALT (SGPT) 22 10/05/2017 04:38 AM    Alk. phosphatase 64 10/05/2017 04:38 AM       I reviewed recent labs and recent radiologic studies. I independently reviewed radiology images for studies I described above or studies I have ordered. Admission date (for inpatients): 9/18/2017   * No surgery found *  Procedure(s):  EXPLORATORY LAPAROTOMY, EXCISION OF PERITONEAL NODULE, RIGHT COLECTOMY.     ASSESSMENT/PLAN:  Problem List  Date Reviewed: 10/3/2017          Codes Class Noted    RLQ abdominal phlegmon ICD-10-CM: L02.91  ICD-9-CM: 682.9  10/4/2017        Localized peritonitis ([facility] [address] ICD-10-CM: K65.9  ICD-9-CM: 567.9  10/4/2017        Mass of cecum ICD-10-CM: K63.9  ICD-9-CM: 569.9  10/4/2017        Massive Left Retroperitoneal fatty mass seen on CT scan extending from kidney to groin ICD-10-CM: R19.00  ICD-9-CM: 789.30  9/22/2017        Acute cystitis ICD-10-CM: N30.00  ICD-9-CM: 595.0  9/19/2017        RLQ abdominal mass ICD-10-CM: R19.03  ICD-9-CM: 789.33  9/19/2017        RLQ abdominal pain ICD-10-CM: R10.31  ICD-9-CM: 789.03  9/19/2017        Leukocytosis ICD-10-CM: D72.829  ICD-9-CM: 288.60  9/19/2017        Non-intractable vomiting with nausea ICD-10-CM: R11.2  ICD-9-CM: 787.01  9/19/2017        * (Principal)Intra-abdominal infection ICD-10-CM: B99.9  ICD-9-CM: 136.9  9/18/2017        Anxiety state, unspecified ICD-10-CM: F41.1  ICD-9-CM: 300.00  Unknown        GERD (gastroesophageal reflux disease) ICD-10-CM: K21.9  ICD-9-CM: 530.81  Unknown        Hypercholesterolemia ICD-10-CM: E78.00  ICD-9-CM: 272.0  Unknown            Principal Problem:    Intra-abdominal infection (9/18/2017)    Active Problems:    Acute cystitis (9/19/2017)      RLQ abdominal mass (9/19/2017)      RLQ abdominal pain (9/19/2017)      Leukocytosis (9/19/2017)      Non-intractable vomiting with nausea (9/19/2017)      Massive Left Retroperitoneal fatty mass seen on CT scan extending from kidney to groin (9/22/2017)      RLQ abdominal phlegmon (10/4/2017)      Localized peritonitis ([address] (10/4/2017)      Mass of cecum (10/4/2017)       POD2 right colectomy  NPO/TPN  Await return of bowel function  Pt pulled out her NGT; will leave out unless abd distention or N/V  Torres  OOB qday  Empty STELLA PRN      Await path for colon --> rule out mailgnancy  Left retroperitoneal infiltrative fatty mass-->rule out liposarcoma  CEA and  were normal      GI (pepcid- add to TPN) and VTE (lovenox) prophylaxis ordered

## 2017-10-05 NOTE — ROUTINE PROCESS
END OF SHIFT NOTE:    INTAKE/OUTPUT  10/04 0701 - 10/05 0700  In: 6817 [I.V.:3269]  Out: 9273 [Urine:1500; Drains:40]  Voiding: YES  Catheter: YES  Drain:   Jimbo-Castañeda Drain 10/03/17 Right; Lower Abdomen (Active)   Site Assessment Clean, dry, & intact 10/5/2017  1:08 AM   Dressing Status Clean, dry, & intact 10/5/2017  1:08 AM   Status Patent; Charged;Draining;Suction (specify 10/5/2017  1:08 AM   Drainage Color Serosanguinous 10/5/2017  1:08 AM   Output (ml) 10 ml 10/5/2017  1:08 AM       Nasogastric Tube 10/03/17 (Active)   Site Assessment Clean, dry, & intact 10/5/2017  1:08 AM   Dressing Status Clean, dry, & intact 10/5/2017  1:08 AM   G Port Status Intermittent Suction 10/5/2017  1:08 AM   External Insertion Hema (cms) 50 cms 10/4/2017  7:40 PM   Drainage Description Green 10/5/2017  1:08 AM   Drainage Chamber Level (ml) 500 ml 10/5/2017  1:08 AM   Output (ml) 50 ml 10/5/2017  1:08 AM               Flatus: Patient does not have flatus present. Stool:  0 occurrences. Characteristics:  Stool Assessment  Stool Color: Tan (Comment)  Stool Appearance: Loose  Stool Amount: Medium  Stool Source/Status: Rectum    Emesis: 0 occurrences. Characteristics:        VITAL SIGNS  Patient Vitals for the past 12 hrs:   Temp Pulse Resp BP SpO2   10/05/17 0400 97.5 °F (36.4 °C) (!) 105 18 121/68 98 %   10/04/17 2343 99 °F (37.2 °C) 89 18 129/70 95 %   10/04/17 1957 98.1 °F (36.7 °C) 79 18 116/69 97 %       Pain Assessment  Pain Intensity 1: 9 (10/04/17 5395)  Pain Location 1: Abdomen  Pain Intervention(s) 1: Encouraged PCA  Patient Stated Pain Goal: 0    Ambulating  Yes    Shift report given to oncoming nurse at the bedside.     Bentley Montiel LPN

## 2017-10-05 NOTE — PROGRESS NOTES
Dr. Emerson Orr informed that patient had become nauseated, gagging and had been medicated with phenergan for this, temperature 100.4, had chills and temperature down to 100.2 at present. uop at 0700 was 300, 1200 was 200 and 1600 was 100. Also instructed patient to use incentive spirometry but patient refused all day, \" I do it later. \"  Will continue to monitor.

## 2017-10-06 LAB
ALBUMIN SERPL-MCNC: 2.1 G/DL (ref 3.2–4.6)
ALBUMIN/GLOB SERPL: 0.6 {RATIO} (ref 1.2–3.5)
ALP SERPL-CCNC: 71 U/L (ref 50–136)
ALT SERPL-CCNC: 16 U/L (ref 12–65)
ANION GAP SERPL CALC-SCNC: 6 MMOL/L (ref 7–16)
AST SERPL-CCNC: 9 U/L (ref 15–37)
BACTERIA SPEC CULT: ABNORMAL
BILIRUB SERPL-MCNC: 0.3 MG/DL (ref 0.2–1.1)
BUN SERPL-MCNC: 13 MG/DL (ref 8–23)
CALCIUM SERPL-MCNC: 7.8 MG/DL (ref 8.3–10.4)
CHLORIDE SERPL-SCNC: 106 MMOL/L (ref 98–107)
CO2 SERPL-SCNC: 28 MMOL/L (ref 21–32)
CREAT SERPL-MCNC: 0.5 MG/DL (ref 0.6–1)
ERYTHROCYTE [DISTWIDTH] IN BLOOD BY AUTOMATED COUNT: 17.5 % (ref 11.9–14.6)
GLOBULIN SER CALC-MCNC: 3.7 G/DL (ref 2.3–3.5)
GLUCOSE SERPL-MCNC: 117 MG/DL (ref 65–100)
GRAM STN SPEC: ABNORMAL
GRAM STN SPEC: ABNORMAL
HCT VFR BLD AUTO: 27 % (ref 35.8–46.3)
HGB BLD-MCNC: 8.3 G/DL (ref 11.7–15.4)
LACTATE SERPL-SCNC: 0.7 MMOL/L (ref 0.4–2)
MAGNESIUM SERPL-MCNC: 1.8 MG/DL (ref 1.8–2.4)
MCH RBC QN AUTO: 24.6 PG (ref 26.1–32.9)
MCHC RBC AUTO-ENTMCNC: 30.7 G/DL (ref 31.4–35)
MCV RBC AUTO: 80.1 FL (ref 79.6–97.8)
PHOSPHATE SERPL-MCNC: 2.1 MG/DL (ref 2.3–3.7)
PLATELET # BLD AUTO: 234 K/UL (ref 150–450)
PMV BLD AUTO: 10.3 FL (ref 10.8–14.1)
POTASSIUM SERPL-SCNC: 3.4 MMOL/L (ref 3.5–5.1)
PROT SERPL-MCNC: 5.8 G/DL (ref 6.3–8.2)
RBC # BLD AUTO: 3.37 M/UL (ref 4.05–5.25)
SERVICE CMNT-IMP: ABNORMAL
SODIUM SERPL-SCNC: 140 MMOL/L (ref 136–145)
WBC # BLD AUTO: 5.1 K/UL (ref 4.3–11.1)

## 2017-10-06 PROCEDURE — 36592 COLLECT BLOOD FROM PICC: CPT

## 2017-10-06 PROCEDURE — 97116 GAIT TRAINING THERAPY: CPT

## 2017-10-06 PROCEDURE — 97530 THERAPEUTIC ACTIVITIES: CPT

## 2017-10-06 PROCEDURE — 87186 SC STD MICRODIL/AGAR DIL: CPT | Performed by: SURGERY

## 2017-10-06 PROCEDURE — 74011250637 HC RX REV CODE- 250/637: Performed by: SURGERY

## 2017-10-06 PROCEDURE — 80053 COMPREHEN METABOLIC PANEL: CPT | Performed by: SURGERY

## 2017-10-06 PROCEDURE — 94760 N-INVAS EAR/PLS OXIMETRY 1: CPT

## 2017-10-06 PROCEDURE — 87077 CULTURE AEROBIC IDENTIFY: CPT | Performed by: SURGERY

## 2017-10-06 PROCEDURE — 74011250636 HC RX REV CODE- 250/636: Performed by: SURGERY

## 2017-10-06 PROCEDURE — 87205 SMEAR GRAM STAIN: CPT | Performed by: SURGERY

## 2017-10-06 PROCEDURE — 36415 COLL VENOUS BLD VENIPUNCTURE: CPT | Performed by: SURGERY

## 2017-10-06 PROCEDURE — 85027 COMPLETE CBC AUTOMATED: CPT | Performed by: SURGERY

## 2017-10-06 PROCEDURE — 74011000258 HC RX REV CODE- 258: Performed by: SURGERY

## 2017-10-06 PROCEDURE — 74011000250 HC RX REV CODE- 250: Performed by: SURGERY

## 2017-10-06 PROCEDURE — 77010033678 HC OXYGEN DAILY

## 2017-10-06 PROCEDURE — 87040 BLOOD CULTURE FOR BACTERIA: CPT | Performed by: SURGERY

## 2017-10-06 PROCEDURE — 65270000029 HC RM PRIVATE

## 2017-10-06 PROCEDURE — 84100 ASSAY OF PHOSPHORUS: CPT | Performed by: SURGERY

## 2017-10-06 PROCEDURE — 83735 ASSAY OF MAGNESIUM: CPT | Performed by: SURGERY

## 2017-10-06 PROCEDURE — 87106 FUNGI IDENTIFICATION YEAST: CPT | Performed by: SURGERY

## 2017-10-06 PROCEDURE — 83605 ASSAY OF LACTIC ACID: CPT | Performed by: SURGERY

## 2017-10-06 RX ORDER — ACETAMINOPHEN 650 MG/1
650 SUPPOSITORY RECTAL
Status: DISCONTINUED | OUTPATIENT
Start: 2017-10-06 | End: 2017-10-09

## 2017-10-06 RX ORDER — METRONIDAZOLE 500 MG/100ML
500 INJECTION, SOLUTION INTRAVENOUS EVERY 12 HOURS
Status: DISCONTINUED | OUTPATIENT
Start: 2017-10-06 | End: 2017-10-09

## 2017-10-06 RX ORDER — CIPROFLOXACIN 2 MG/ML
400 INJECTION, SOLUTION INTRAVENOUS EVERY 12 HOURS
Status: DISCONTINUED | OUTPATIENT
Start: 2017-10-06 | End: 2017-10-09

## 2017-10-06 RX ADMIN — SODIUM CHLORIDE 12.5 MG: 9 INJECTION INTRAMUSCULAR; INTRAVENOUS; SUBCUTANEOUS at 01:48

## 2017-10-06 RX ADMIN — METRONIDAZOLE 500 MG: 500 INJECTION, SOLUTION INTRAVENOUS at 21:08

## 2017-10-06 RX ADMIN — METRONIDAZOLE 500 MG: 500 INJECTION, SOLUTION INTRAVENOUS at 10:03

## 2017-10-06 RX ADMIN — POTASSIUM CHLORIDE: 2 INJECTION, SOLUTION, CONCENTRATE INTRAVENOUS at 18:42

## 2017-10-06 RX ADMIN — SODIUM CHLORIDE 12.5 MG: 9 INJECTION INTRAMUSCULAR; INTRAVENOUS; SUBCUTANEOUS at 19:38

## 2017-10-06 RX ADMIN — SOYBEAN OIL 250 ML: 20 INJECTION, SOLUTION INTRAVENOUS at 18:42

## 2017-10-06 RX ADMIN — ENOXAPARIN SODIUM 40 MG: 40 INJECTION SUBCUTANEOUS at 21:08

## 2017-10-06 RX ADMIN — CIPROFLOXACIN 400 MG: 2 INJECTION, SOLUTION INTRAVENOUS at 21:09

## 2017-10-06 RX ADMIN — ACETAMINOPHEN 650 MG: 650 SUPPOSITORY RECTAL at 22:36

## 2017-10-06 RX ADMIN — PIPERACILLIN SODIUM,TAZOBACTAM SODIUM 3.38 G: 3; .375 INJECTION, POWDER, FOR SOLUTION INTRAVENOUS at 06:35

## 2017-10-06 RX ADMIN — ENOXAPARIN SODIUM 40 MG: 40 INJECTION SUBCUTANEOUS at 08:56

## 2017-10-06 RX ADMIN — ONDANSETRON 4 MG: 2 INJECTION INTRAMUSCULAR; INTRAVENOUS at 11:54

## 2017-10-06 RX ADMIN — POTASSIUM PHOSPHATE, MONOBASIC AND POTASSIUM PHOSPHATE, DIBASIC: 224; 236 INJECTION, SOLUTION INTRAVENOUS at 09:55

## 2017-10-06 RX ADMIN — Medication 600 UNITS: at 14:25

## 2017-10-06 RX ADMIN — CIPROFLOXACIN 400 MG: 2 INJECTION, SOLUTION INTRAVENOUS at 09:59

## 2017-10-06 RX ADMIN — Medication 600 UNITS: at 21:09

## 2017-10-06 NOTE — PROGRESS NOTES
Problem: Mobility Impaired (Adult and Pediatric)  Goal: *Acute Goals and Plan of Care (Insert Text)  LTG:  (1.)Ms. Jen Carmichael will move from supine to sit and sit to supine in bed with STAND BY ASSIST within 7 day(s). (2.)Ms. Jen Carmichael will transfer from bed to chair and chair to bed with SUPERVISION using the least restrictive device within 7 day(s). (3.)Ms. Jen Carmichael will ambulate with SUPERVISION for 500 feet with the least restrictive device within 7 day(s). ________________________________________________________________________________________________      PHYSICAL THERAPY: Daily Note, Treatment Day: 1st and AM 10/6/2017  INPATIENT: Hospital Day: 23  [place] / [place] / Product Type: Medicare /      NAME/AGE/GENDER: Markos Pinto is a 68 y.o. female             PRIMARY DIAGNOSIS: infected mass  Intra-abdominal infection  INFLAMMATORY RIGHT LOWER QUADRANT MASS Intra-abdominal infection Intra-abdominal infection  Procedure(s) (LRB):  EXPLORATORY LAPAROTOMY, EXCISION OF PERITONEAL NODULE, RIGHT COLECTOMY. (N/A)  3 Days Post-Op  ICD-10: Treatment Diagnosis:       · Other abnormalities of gait and mobility (R26.89)   Precaution/Allergies:  Morphine       ASSESSMENT:      Ms. Jen Carmichael seen this AM for gait & transfer training. Pt. Able to progress gait distance this visit using RW, although cont. To require assist x2 secondary to multiple lines & leads. Pt. Also ambulates with a slow alexis which is not yet functional for household distances. Therefore, pt. Cont. To benefit from PT services to address to ronaldoas kely pt's functional independence. This section established at most recent assessment   PROBLEM LIST (Impairments causing functional limitations):  1. Decreased Strength  2. Decreased Transfer Abilities  3. Decreased Ambulation Ability/Technique  4. Decreased Balance  5.  Decreased Activity Tolerance    INTERVENTIONS PLANNED: (Benefits and precautions of physical therapy have been discussed with the patient.)  1. Bed Mobility  2. Gait Training  3. Therapeutic Activites  4. Therapeutic Exercise/Strengthening  5. Transfer Training      TREATMENT PLAN: Frequency/Duration: 3 times a week for duration of hospital stay  Rehabilitation Potential For Stated Goals: GOOD      RECOMMENDED REHABILITATION/EQUIPMENT: (at time of discharge pending progress): Due to the probability of continued deficits (see above) this patient will not likely need continued skilled physical therapy after discharge. Equipment:   · None at this time                   HISTORY:   History of Present Injury/Illness (Reason for Referral):  See above for surgery  10/2/17  Past Medical History/Comorbidities:   Ms. Vamshi Sexton  has a past medical history of Anxiety state, unspecified; GERD (gastroesophageal reflux disease); and Hypercholesterolemia. Ms. Vamshi Sexton  has a past surgical history that includes knee replacement; gyn; cholecystectomy; and colonoscopy.   Social History/Living Environment:   Home Environment: Private residence  One/Two Story Residence: One story  Living Alone: No  Support Systems: Child(zeenat)  Patient Expects to be Discharged to[de-identified] Private residence  Current DME Used/Available at Home: None  Prior Level of Function/Work/Activity:  Fully independent with all mobility      Number of Personal Factors/Comorbidities that affect the Plan of Care: 1-2: MODERATE COMPLEXITY   EXAMINATION:   Most Recent Physical Functioning:   Gross Assessment:                  Posture:  Posture (WDL): Exceptions to WDL  Posture Assessment: Trunk flexion, Rounded shoulders  Balance:  Sitting: Impaired  Sitting - Static: Good (unsupported)  Sitting - Dynamic: Fair (occasional)  Standing: Impaired  Standing - Static: Fair  Standing - Dynamic : Fair Bed Mobility:     Wheelchair Mobility:     Transfers:  Sit to Stand: Minimum assistance  Stand to Sit: Minimum assistance  Gait:     Base of Support: Widened  Speed/Bhumika: Slow;Shuffled  Step Length: Right shortened;Left shortened  Stance: Right increased; Left increased  Gait Abnormalities: Decreased step clearance; Step to gait  Distance (ft): 50 Feet (ft)  Assistive Device: Gait belt;Walker, rolling  Ambulation - Level of Assistance: Minimal assistance;Assist x2 (second poerson assist to manage multiple lines)       Body Structures Involved:  1. Digestive Structures  2. Muscles Body Functions Affected:  1. Movement Related  2. Digestive Activities and Participation Affected:  1. Mobility  2. Self Care   Number of elements that affect the Plan of Care: 4+: HIGH COMPLEXITY   CLINICAL PRESENTATION:   Presentation: Evolving clinical presentation with changing clinical characteristics: MODERATE COMPLEXITY   CLINICAL DECISION MAKIN [facility] Mobility Inpatient Short Form  How much difficulty does the patient currently have. .. Unable A Lot A Little None   1. Turning over in bed (including adjusting bedclothes, sheets and blankets)? [ ] 1   [X] 2   [ ] 3   [ ] 4   2. Sitting down on and standing up from a chair with arms ( e.g., wheelchair, bedside commode, etc.)   [ ] 1   [ ] 2   [X] 3   [ ] 4   3. Moving from lying on back to sitting on the side of the bed? [ ] 1   [X] 2   [ ] 3   [ ] 4   How much help from another person does the patient currently need. .. Total A Lot A Little None   4. Moving to and from a bed to a chair (including a wheelchair)? [ ] 1   [X] 2   [ ] 3   [ ] 4   5. Need to walk in hospital room? [ ] 1   [X] 2   [ ] 3   [ ] 4   6. Climbing 3-5 steps with a railing? [ ] 1   [X] 2   [ ] 3   [ ] 4   © , Trustees of [address] [place] [address] [address] [address], under license to [employer]. All rights reserved    Score:  Initial: 13 Most Recent: X (Date: -- )     Interpretation of Tool:  Represents activities that are increasingly more difficult (i.e. Bed mobility, Transfers, Gait).        Score 24 23 22-20 19-15 14-10 9-7 6       Modifier CH CI CJ CK CL CM CN · Mobility - Walking and Moving Around:               - CURRENT STATUS:    CL - 60%-79% impaired, limited or restricted               - GOAL STATUS:           CK - 40%-59% impaired, limited or restricted               - D/C STATUS:                       ---------------To be determined---------------  [place] / [place] / Product Type: Medicare /       Medical Necessity:     · Skilled intervention continues to be required due to impaired functional mobility. Reason for Services/Other Comments:  · Patient continues to require skilled intervention due to impaired bed mobility, transfers and ambulation, decreased strength. Use of outcome tool(s) and clinical judgement create a POC that gives a: Questionable prediction of patient's progress: MODERATE COMPLEXITY                 TREATMENT:   (In addition to Assessment/Re-Assessment sessions the following treatments were rendered)   Pre-treatment Symptoms/Complaints: \"That went better than I thought it would. \"   Pain: Initial:   Pain Intensity 1: 0  Post Session:  No change      Therapeutic Activity: (  50 minutes): Therapeutic activities including Chair transfers and Ambulation on level ground to improve mobility and strength. Required minimal min A to promote dynamic balance in standing. Also provided pt. Education to pt, son, & daughter regarding rehab options post-d/c. Braces/Orthotics/Lines/Etc:   · IV  · snyder catheter  · drain HP  · nasogastric tube  · O2 Device: Nasal cannula  Treatment/Session Assessment:    · Response to Treatment:  Resting comfortably in chair  · Interdisciplinary Collaboration:  · Physical Therapist, Registered Nurse and Rehabilitation Attendant  · After treatment position/precautions:  · Up in chair, Bed/Chair-wheels locked, Bed in low position, Call light within reach, RN notified and Family at bedside  · Compliance with Program/Exercises:  Will assess as treatment progresses. · Recommendations/Intent for next treatment session: \"Next visit will focus on advancements to more challenging activities and reduction in assistance provided\".   Total Treatment Duration:PT Patient Time In/Time Out  Time In: 1130  Time Out: 201 [facility],

## 2017-10-06 NOTE — PROGRESS NOTES
END OF SHIFT NOTE:    INTAKE/OUTPUT  10/04 0701 - 10/05 0700  In: 4539 [I.V.:3269]  Out: 5356 [Urine:1500; Drains:40]  Voiding: NO  Catheter: YES  Drain:   Jimbo-Castañeda Drain 10/03/17 Right; Lower Abdomen (Active)   Site Assessment Clean, dry, & intact 10/5/2017  1:08 AM   Dressing Status Clean, dry, & intact 10/5/2017  1:08 AM   Status Patent; Charged;Draining;Suction (specify 10/5/2017  1:08 AM   Drainage Color Serosanguinous 10/5/2017  1:08 AM   Output (ml) 10 ml 10/5/2017  1:08 AM               Flatus: Patient does not have flatus present. Stool:  0 occurrences. Characteristics:  Stool Assessment  Stool Color: Tan (Comment)  Stool Appearance: Loose  Stool Amount: Medium  Stool Source/Status: Rectum    Emesis: 0 occurrences. Characteristics:        VITAL SIGNS  Patient Vitals for the past 12 hrs:   Temp Pulse Resp BP SpO2   10/05/17 1734 100.2 °F (37.9 °C) (!) 116 14 153/72 97 %   10/05/17 1636 100.4 °F (38 °C) - - - -   10/05/17 1534 99.7 °F (37.6 °C) 98 17 127/73 99 %   10/05/17 1043 97.9 °F (36.6 °C) 90 17 118/67 95 %       Pain Assessment  Pain Intensity 1: 10 (10/05/17 1917)  Pain Location 1: Abdomen  Pain Intervention(s) 1: Encouraged PCA  Patient Stated Pain Goal: 0    Ambulating  No    Shift report given to oncoming nurse at the bedside.     Ian Vela RN

## 2017-10-06 NOTE — PROGRESS NOTES
Problem: Nutrition Deficit  Goal: *Optimize nutritional status  Nutrition F/U   TPN management (Dr. Nayeli Simmons)   Assessment:   · Diet order(s): 9-18: NPO  · Central line access: Double lumen PICC  · TPN dependent S/P excision of peritoneal node, right colectomy and drainage of peritonitis (10-4)  · Phos 2.1-only slight improvement with 20 mmole bolus yesterday and Phos increased in TPN  from ~10 meq/d to ~ 40 meq/d on 10-4. Would benefit from increased Phos in TPN. · K 3.4-current TPN provides ~81 meq K/d. May benefit from increased K in TPN. NG removed yesterday so pt has decreased K losses now. · Last 3 Recorded Weights in this Encounter   ·  · 10/04/17 0338 · 10/05/17 0516 · 10/06/17 0504   · Weight: · 110.2 kg (243 lb) · 110 kg (242 lb 8.1 oz) · 109.8 kg (242 lb)   · No noted edema  · Macronutrient needs:  EER: 7619-6064 kcal /day (13-18 kcal/kg actual BW)   EPR: 65-82 grams protein/day (1.2-1.5 grams/kg IBW). Dr Bhakti Blake standard request is 2 gm/kg MAK=327 g PRO/d)   Max CHO: 314 grams/day (4mg/kg IBW/min)   Fluid: 1ml/kcal   Intake/Comparative Standards: 2L/d of 10%DEX/ 4.25%AA at 85 ml/hr with 250 ml 20% Lipids daily provides 1520 kcal/d (100% of needs), 85 grams of protein/d (1.56 grams of protein/kg of IBW), 200 grams of CHO/d (does not exceed maximum CHO load) and ~2300 ml of total volume/d (100% of needs). Intervention:   Meals and snacks: NPO. Await diet initiation and progression. Nutritional Supplement Therapy: Electrolyte replacement per nutritional support protocols are active on MAR. Supplement K and Phos per protocols. PN:   1. Increase Phos to 30 meq/L or ~61 meq/d. Increase K to 50 meq/L or ~102 meq/d  2.  Check Phos in AM.  3. If 2 grams protein/kg is desired for this pt, this could be met with 10%DEX/ 4.25%AA at 105 ml/hr with 250 ml 20% Lipids daily provides 1785 kcal/d (100% of needs), 107 grams of protein/d (1.96 grams of protein/kg of IBW), 252 grams of CHO/d (does not exceed maximum CHO load) and ~2770 ml of total volume/d (>100% of needs) vs 2L/d of 15%DEX/5%AA at 85 ml/hr with 250 ml 20% Lipids daily provides 1920 kcal/d (85% of needs), 100 grams of protein/d (1.83 grams of protein/kg of IBW),300 grams of CHO/d (does not exceed maximum CHO load) and ~2300 ml of total volume/d (>100% of needs)  Tracy Alvarenga, RD, LD, [facility] [phone]

## 2017-10-06 NOTE — PROGRESS NOTES
Dispo update:  Checked revenue codes with [facility]:  No, Ms. Leonard Goddard does not have them, and thus not a candidate for transfer.

## 2017-10-06 NOTE — PROGRESS NOTES
Chris Mcgowan NP informed that patient had temperature of 102.6, refusing to use IS, and had been medicated several times for nausea since she pulled snyder cath out yesterday.

## 2017-10-06 NOTE — ROUTINE PROCESS
Unknown amount of medication wasted from Dilaudid PCA due to leak noted at connection site as connection tubing not connected correctly & medication dripping onto floor. Alli Negrete in pharmacy notified. Maximus Dimas RN  notified. Instructed by Alli Negrete in pharmacy to allow Dilaudid remaining in vile to infuse per PCA. Will continue to monitor.

## 2017-10-06 NOTE — PROGRESS NOTES
H&P/Consult Note/Progress Note/Office Note:   Rona Tse  MRN: [id number]  :1940  Age:76 y.o.    HPI: Gabriela Cunha V 70 y. o. female who is s/p peritoneal drainage and right hemicolectomy for a RLQ phlegmon involving bowel and cecal mass. She also had biopsy of a left retroperitoneal fatty tumor noted in pre-op CT. This was done after about 2 weeks of bowel rest IV Abx, and TPN failed to improve the RLQ phlegmon on serial CT imaging      Prior to surgery she originally presented to and was admitted form the ER with a 1 month h/o severe (8/10) RLQ pain.    This was progressively worsening and associated with intermittent N/V. Nothing made it better or worse. She was treated previously with laxatives without improvement. She came the [facility] ER about 5 days prior to admission and was treated with antibiotics for UTI.  No imaging was done. She came back to the ER on the day of admission and CT scan was obtained as shown below. She is s/p lap francois at [facility] in approx .    She is s/p hysterectomy about 40 yrs ago for benign fibroids. Bushra Grossman thinks her ovaries remain. She denies associated bloody BMs or diarrhea.            Results for Michael Lopez (MRN [id number]) as of 2017 09:35     Ref. Range 2017 16:42   CEA Latest Ref Range: 0.0 - 3.0 ng/mL 0.8   CA-125 Latest Ref Range: 1.5 - 35.0 U/mL 5           17 CT abd/pelvis with contrast with IV but not oral contrast   Minimal linear atelectasis or scarring is present in the lung bases, left greater than right. There is a small to moderate-sized hiatal hernia. No pleural or pericardial fluid.      Small hypoattenuating lesions are present in the right hepatic lobe and left kidney, too small to definitively characterize but statistically likely  representing cysts. There is central biliary ductal ectasia s/p cholecystectomy.      The remaining solid abdominal viscera are unremarkable to the limits of  noncontrast CT.  Atherosclerotic abdominal aorta and branch vessels. No upper  abdominal no retroperitoneal lymph node enlargement. Nonopacified small bowel  loops are normal in caliber.      Pelvis: There is somewhat prominent lipomatosis along the left posterior  pararenal fascia and iliopsoas without associated soft tissue component,  measuring up to 12.0 x 23 x 9 cm. The bladder is predominantly decompressed. Uterus not visualized.      Extensive diverticular changes are present in the colon which is otherwise  normal in caliber. There is a large ill-defined inflammatory process in the  right lower quadrant measuring 5.9 x 10.0 x 6.4 cm approximating the ileocecal  junction from which the appendix is not visualized as a distinctly separate  structure. Multiple high attenuation foci are present within or adjacent to the  bowel, suspicious for medication and/or enteroliths. There is moderate adjacent  inflammatory fat stranding and multiple prominent right lower quadrant  mesenteric lymph nodes, the largest of which measures 1.2 cm in short axis.      Trace dependent free pelvic fluid. No aggressive osseous lesions.      IMPRESSION:  Nonspecific inflammatory process in RLQ at the ileocecal junction. Diff Dx include infectious, and ischemic colitis, appendicitis, inflammatory bowel disease, omental infarct.       Large extraperitoneal predominantly fatty attenuation mass without definite enhancing soft tissue. Size places the patient at risk for sarcoma, the inferior extent of which is not included in the field of view.        9/25/17 CT abd/pelvis with oral and IV contrast   Tiny hypodense lesion near the dome of the liver and another seen within the right posterior lobe. These are too small to further characterize. Otherwise, the liver and spleen enhances homogeneously without discrete lesions. There is no biliary ductal dilatation. Clips are present from prior cholecystectomy.  The pancreas and adrenal glands are normal. The kidneys  enhance symmetrically. Bowel loops in the upper abdomen are normal. No definite upper abdominal lymphadenopathy seen.     The RLQ phlegmon seen previously is again noted with significant bowel wall thickening involving the cecum and distal ileum. There is adjacent fat stranding. The large extraperitoneal fatty mass discussed previously is again noted and is unchanged. The bladder and rectum are normal.  No pelvic adenopathy seen. No free air or free fluid seen within the pelvis.       IMPRESSION: No significant interval change when compared with the prior study. 10/2/17 CT abd/pelvis with oral and IV contrast  There is scarring within the lingula with mild bibasilar atelectasis. There is a hiatal hernia present.     CT abdomen: The liver and spleen enhances homogeneously without discrete lesions. There is no biliary ductal dilatation. Clips are present from prior cholecystectomy. The pancreas and adrenal glands are normal. The kidneys enhance  symmetrically. Bowel loops in the upper abdomen are normal. No definite upper abdominal lymphadenopathy seen.     CT pelvis: No change in the appearance of the large fat density mass within the retroperitoneum on the left. In addition, the right lower quadrant phlegmon with bowel wall thickening is again seen and is unchanged. There is extensive  diverticulosis. The bladder and rectum are normal. No pelvic adenopathy seen. No free air or free fluid seen within the pelvis.    Bone window evaluation demonstrates no aggressive osseous lesions.     IMPRESSION: No significant interval change when compared with the prior study.           9/19/17 Abd pain unchanged; Mass-effect in RLQ; PICC requested; on IV ABx; NPO  9/20/17 PICC placed yesterday; on TPN; Nausea persists;  RLQ pain no better since admission and still 8/10  Requests adult diapers secondary to urinary incontinence  9/21/17 says her pain is 8/10 but looks comfortable  9/22/17 pain issues improving  9/23/17 Awake in bed. No new complaints. 9/24/17 Resting in bed. No new complaints. Family at bedside.   9/25/17 Overall improvement in RLQ pain compared to admission  9/26/17 reports RLQ abd pain better since admission  9/27/17 no changes  9/28/17 she feels like RX starting to work with less RLQ pain  9/29/17 slow but steady improvement clinically; still with RLQ pain  9/30/17 RLQ pain better compared to admission  10/1/17 she reports pain 4/10 for only about 5hrs total during the day compared to 8/10 pain 24hrs a day at admission  10/2/17 worsening  Pain now is suprapubic region and LLQ; follow-up CT pending  10/3/17 CT images reviewed, decided to proceed with laparotomy--->OR    10/4/17 POD1; doing OK; correct hypophosphatemia; resume TPN no complaints  10/5/17 POD2 WBC normal; she pulled out her NGT  10/6/17 fever; wound OK; OOB today; encouraging inc fidelina; snyder out tomorrow          Past Medical History:   Diagnosis Date    Anxiety state, unspecified     GERD (gastroesophageal reflux disease)     Hypercholesterolemia      Past Surgical History:   Procedure Laterality Date    HX CHOLECYSTECTOMY      HX COLONOSCOPY      Dr Neto Kevin 2012, repeat 5 years    HX GYN      hysterectomy, partial    HX KNEE REPLACEMENT      right     Current Facility-Administered Medications   Medication Dose Route Frequency    potassium phosphate 20 mmol in 0.9% sodium chloride 250 mL infusion   IntraVENous ONCE    fat emulsion 20% (LIPOSYN, INTRALIPID) infusion 250 mL  250 mL IntraVENous QPM    TPN ADULT - dextrose 10% amino acid 4.25%   IntraVENous QPM    diphenhydrAMINE (BENADRYL) injection 25 mg  25 mg IntraVENous Q8H PRN    sodium chloride (NS) flush 5-10 mL  5-10 mL IntraVENous PRN    HYDROmorphone (DILAUDID) 20 mg / 20 mL PCA   IntraVENous CONTINUOUS    enoxaparin (LOVENOX) injection 40 mg  40 mg SubCUTAneous Q12H    ondansetron (ZOFRAN) injection 4 mg  4 mg IntraVENous Q4H PRN    promethazine (PHENERGAN) with saline injection 12.5 mg  12.5 mg IntraVENous Q4H PRN    NUTRITIONAL SUPPORT ELECTROLYTE PRN ORDERS   Does Not Apply PRN    heparin (porcine) pf 600 Units  600 Units InterCATHeter Q8H    piperacillin-tazobactam (ZOSYN) 3.375 g in 0.9% sodium chloride (MBP/ADV) 100 mL  3.375 g IntraVENous Q8H     Morphine  Social History     Social History    Marital status:      Spouse name: N/A    Number of children: N/A    Years of education: N/A     Social History Main Topics    Smoking status: Former Smoker     Quit date: 1/1/1975    Smokeless tobacco: Never Used    Alcohol use No    Drug use: No    Sexual activity: Not Asked     Other Topics Concern    None     Social History Narrative     History   Smoking Status    Former Smoker    Quit date: 1/1/1975   Smokeless Tobacco    Never Used     Family History   Problem Relation Age of Onset    Diabetes Mother     Heart Disease Mother     Thyroid Disease Mother     Heart Disease Father      ROS:  Comprehensive review of systems was otherwise unremarkable except as noted above. Physical Exam:   Visit Vitals    /75    Pulse (!) 109    Temp (!) 102.6 °F (39.2 °C)    Resp 20    Ht 5' 4\" (1.626 m)    Wt 242 lb (109.8 kg)    SpO2 96%    BMI 41.54 kg/m2     Constitutional: Alert, oriented, cooperative patient in no acute distress; appears stated age    Eyes:Sclera are clear. EOMs intact  ENMT: no external lesions gross hearing normal; no obvious neck masses, no ear or lip lesions, nares normal  CV: RRR. Normal perfusion  Resp: No JVD.  Breathing is  non-labored; no audible wheezing.    GI: obese; Midline incision without cellulitis;  RLQ and pelvic STELLA drain is serosang  Torres with light yellow urine  Musculoskeletal: unremarkable with normal function. No embolic signs or cyanosis.    Neuro:  Oriented; moves all 4; no focal deficits  Psychiatric: normal affect and mood, no memory impairment    Recent vitals (if inpt):  Patient Vitals for the past 24 hrs:   BP Temp Pulse Resp SpO2 Weight   10/06/17 0750 - - - - 96 % -   10/06/17 0654 146/75 (!) 102.6 °F (39.2 °C) (!) 109 20 96 % -   10/06/17 0504 - - - - - 242 lb (109.8 kg)   10/06/17 0400 125/73 99.8 °F (37.7 °C) 99 - 98 % -   10/06/17 0002 141/79 99.8 °F (37.7 °C) (!) 109 18 97 % -   10/05/17 2000 128/80 100.1 °F (37.8 °C) (!) 119 16 93 % -   10/05/17 1734 153/72 100.2 °F (37.9 °C) (!) 116 14 97 % -   10/05/17 1636 - 100.4 °F (38 °C) - - - -   10/05/17 1534 127/73 99.7 °F (37.6 °C) 98 17 99 % -   10/05/17 1043 118/67 97.9 °F (36.6 °C) 90 17 95 % -       Labs:  Recent Labs      10/06/17   0442   WBC  5.1   HGB  8.3*   PLT  234   NA  140   K  3.4*   CL  106   CO2  28   BUN  13   CREA  0.50*   GLU  117*   TBILI  0.3   SGOT  9*   ALT  16   AP  71       Lab Results   Component Value Date/Time    WBC 5.1 10/06/2017 04:42 AM    HGB 8.3 10/06/2017 04:42 AM    PLATELET 864 90/49/2879 04:42 AM    Sodium 140 10/06/2017 04:42 AM    Potassium 3.4 10/06/2017 04:42 AM    Chloride 106 10/06/2017 04:42 AM    CO2 28 10/06/2017 04:42 AM    BUN 13 10/06/2017 04:42 AM    Creatinine 0.50 10/06/2017 04:42 AM    Glucose 117 10/06/2017 04:42 AM    Bilirubin, total 0.3 10/06/2017 04:42 AM    AST (SGOT) 9 10/06/2017 04:42 AM    ALT (SGPT) 16 10/06/2017 04:42 AM    Alk. phosphatase 71 10/06/2017 04:42 AM       I reviewed recent labs and recent radiologic studies. I independently reviewed radiology images for studies I described above or studies I have ordered. Admission date (for inpatients): 9/18/2017   * No surgery found *  Procedure(s):  EXPLORATORY LAPAROTOMY, EXCISION OF PERITONEAL NODULE, RIGHT COLECTOMY.     ASSESSMENT/PLAN:  Problem List  Date Reviewed: 10/3/2017          Codes Class Noted    RLQ abdominal phlegmon ICD-10-CM: L02.91  ICD-9-CM: 682.9  10/4/2017        Localized peritonitis ([address] ICD-10-CM: K65.9  ICD-9-CM: 567.9  10/4/2017        Mass of cecum ICD-10-CM: K63.9  ICD-9-CM: 569.9  10/4/2017 Massive Left Retroperitoneal fatty mass seen on CT scan extending from kidney to groin ICD-10-CM: R19.00  ICD-9-CM: 789.30  9/22/2017        Acute cystitis ICD-10-CM: N30.00  ICD-9-CM: 595.0  9/19/2017        RLQ abdominal mass ICD-10-CM: R19.03  ICD-9-CM: 789.33  9/19/2017        RLQ abdominal pain ICD-10-CM: R10.31  ICD-9-CM: 789.03  9/19/2017        Leukocytosis ICD-10-CM: D72.829  ICD-9-CM: 288.60  9/19/2017        Non-intractable vomiting with nausea ICD-10-CM: R11.2  ICD-9-CM: 787.01  9/19/2017        * (Principal)Intra-abdominal infection ICD-10-CM: B99.9  ICD-9-CM: 136.9  9/18/2017        Anxiety state, unspecified ICD-10-CM: F41.1  ICD-9-CM: 300.00  Unknown        GERD (gastroesophageal reflux disease) ICD-10-CM: K21.9  ICD-9-CM: 530.81  Unknown        Hypercholesterolemia ICD-10-CM: E78.00  ICD-9-CM: 272.0  Unknown            Principal Problem:    Intra-abdominal infection (9/18/2017)    Active Problems:    Acute cystitis (9/19/2017)      RLQ abdominal mass (9/19/2017)      RLQ abdominal pain (9/19/2017)      Leukocytosis (9/19/2017)      Non-intractable vomiting with nausea (9/19/2017)      Massive Left Retroperitoneal fatty mass seen on CT scan extending from kidney to groin (9/22/2017)      RLQ abdominal phlegmon (10/4/2017)      Localized peritonitis ([address] (10/4/2017)      Mass of cecum (10/4/2017)       POD3 right colectomy  Fever-->peritoneal cultures growing pseudomonas (intermediate sen to zosyn but sen to quinolones)  Change IV zosyn to IV cipro/flagyl    NPO/TPN  Await return of bowel function  Pt pulled out her NGT; will leave out unless abd distention or N/V  Torres out Saturday  OOB qday  Empty STELLA PRN  Inc fidelina      Await path for colon --> rule out mailgnancy  Left retroperitoneal infiltrative fatty mass-->rule out liposarcoma  CEA and  were normal      GI (pepcid- add to TPN) and VTE (lovenox) prophylaxis ordered

## 2017-10-06 NOTE — PROGRESS NOTES
Problem: Mobility Impaired (Adult and Pediatric)  Goal: *Acute Goals and Plan of Care (Insert Text)  LTG:  (1.)Ms. Deborah Arrington will move from supine to sit and sit to supine in bed with STAND BY ASSIST within 7 day(s). (2.)Ms. Deborah Arrington will transfer from bed to chair and chair to bed with SUPERVISION using the least restrictive device within 7 day(s). (3.)Ms. Deborah Arrington will ambulate with SUPERVISION for 500 feet with the least restrictive device within 7 day(s). ________________________________________________________________________________________________      PHYSICAL THERAPY: Daily Note, Treatment Day: 2nd and PM 10/6/2017  INPATIENT: Hospital Day: 23  [place] / Product Type: Medicare /      NAME/AGE/GENDER: Shawna Call is a 68 y.o. female             PRIMARY DIAGNOSIS: infected mass  Intra-abdominal infection  INFLAMMATORY RIGHT LOWER QUADRANT MASS Intra-abdominal infection Intra-abdominal infection  Procedure(s) (LRB):  EXPLORATORY LAPAROTOMY, EXCISION OF PERITONEAL NODULE, RIGHT COLECTOMY. (N/A)  3 Days Post-Op  ICD-10: Treatment Diagnosis:       · Other abnormalities of gait and mobility (R26.89)   Precaution/Allergies:  Morphine       ASSESSMENT:      Ms. Deborah Arrington seen this PM to assist back to bed (family found trying to assist her as PT was walking by room). Pt. Requires increased time for all mobility & cont. To have difficulty performing bed mobility activities. Therefore, pt. Benefits from cont. PT services to address. This section established at most recent assessment   PROBLEM LIST (Impairments causing functional limitations):  1. Decreased Strength  2. Decreased Transfer Abilities  3. Decreased Ambulation Ability/Technique  4. Decreased Balance  5. Decreased Activity Tolerance    INTERVENTIONS PLANNED: (Benefits and precautions of physical therapy have been discussed with the patient.)  1. Bed Mobility  2. Gait Training  3.  Therapeutic Activites  4. Therapeutic Exercise/Strengthening  5. Transfer Training      TREATMENT PLAN: Frequency/Duration: 3 times a week for duration of hospital stay  Rehabilitation Potential For Stated Goals: GOOD      RECOMMENDED REHABILITATION/EQUIPMENT: (at time of discharge pending progress): Due to the probability of continued deficits (see above) this patient will not likely need continued skilled physical therapy after discharge. Equipment:   · None at this time                   HISTORY:   History of Present Injury/Illness (Reason for Referral):  See above for surgery  10/2/17  Past Medical History/Comorbidities:   Ms. Dameon Pride  has a past medical history of Anxiety state, unspecified; GERD (gastroesophageal reflux disease); and Hypercholesterolemia. Ms. Dameon Pride  has a past surgical history that includes knee replacement; gyn; cholecystectomy; and colonoscopy. Social History/Living Environment:   Home Environment: Private residence  One/Two Story Residence: One story  Living Alone: No  Support Systems: Child(zeenat)  Patient Expects to be Discharged to[de-identified] Private residence  Current DME Used/Available at Home: None  Prior Level of Function/Work/Activity:  Fully independent with all mobility      Number of Personal Factors/Comorbidities that affect the Plan of Care: 1-2: MODERATE COMPLEXITY   EXAMINATION:   Most Recent Physical Functioning:   Gross Assessment:                  Posture:  Posture (WDL): Exceptions to WDL  Posture Assessment: Trunk flexion, Rounded shoulders  Balance:  Sitting: Intact  Sitting - Static: Good (unsupported)  Sitting - Dynamic: Fair (occasional)  Standing: Impaired  Standing - Static: Fair  Standing - Dynamic : Fair Bed Mobility:  Sit to Supine: Moderate assistance (for LEs)  Scooting: Total assistance; Other (comment) (using Bowen)  Wheelchair Mobility:     Transfers:  Sit to Stand: Minimum assistance  Stand to Sit: Minimum assistance (increased time required)  Gait:     Base of Support: Widened  Speed/Bhumika: Slow;Shuffled  Step Length: Right shortened;Left shortened  Stance: Right increased; Left increased  Gait Abnormalities: Decreased step clearance; Step to gait  Distance (ft): 5 Feet (ft) (back to bed)  Assistive Device: Gait belt;Walker, rolling  Ambulation - Level of Assistance: Minimal assistance;Assist x2       Body Structures Involved:  1. Digestive Structures  2. Muscles Body Functions Affected:  1. Movement Related  2. Digestive Activities and Participation Affected:  1. Mobility  2. Self Care   Number of elements that affect the Plan of Care: 4+: HIGH COMPLEXITY   CLINICAL PRESENTATION:   Presentation: Evolving clinical presentation with changing clinical characteristics: MODERATE COMPLEXITY   CLINICAL DECISION MAKIN [facility] Inpatient Short Form  How much difficulty does the patient currently have. .. Unable A Lot A Little None   1. Turning over in bed (including adjusting bedclothes, sheets and blankets)? [ ] 1   [X] 2   [ ] 3   [ ] 4   2. Sitting down on and standing up from a chair with arms ( e.g., wheelchair, bedside commode, etc.)   [ ] 1   [ ] 2   [X] 3   [ ] 4   3. Moving from lying on back to sitting on the side of the bed? [ ] 1   [X] 2   [ ] 3   [ ] 4   How much help from another person does the patient currently need. .. Total A Lot A Little None   4. Moving to and from a bed to a chair (including a wheelchair)? [ ] 1   [X] 2   [ ] 3   [ ] 4   5. Need to walk in hospital room? [ ] 1   [X] 2   [ ] 3   [ ] 4   6. Climbing 3-5 steps with a railing? [ ] 1   [X] 2   [ ] 3   [ ] 4   © 2007, Trustees of [address] [place] [address] [address] [id number], under license to [employer]. All rights reserved    Score:  Initial: 13 Most Recent: X (Date: -- )     Interpretation of Tool:  Represents activities that are increasingly more difficult (i.e. Bed mobility, Transfers, Gait).        Score 24 23 22-20 19-15 14-10 9-7 6       Modifier CH CI CJ CK CL CM CN         · Mobility - Walking and Moving Around:               - CURRENT STATUS:    CL - 60%-79% impaired, limited or restricted               - GOAL STATUS:           CK - 40%-59% impaired, limited or restricted               - D/C STATUS:                       ---------------To be determined---------------  [place] / Product Type: Medicare /       Medical Necessity:     · Skilled intervention continues to be required due to impaired functional mobility. Reason for Services/Other Comments:  · Patient continues to require skilled intervention due to impaired bed mobility, transfers and ambulation, decreased strength. Use of outcome tool(s) and clinical judgement create a POC that gives a: Questionable prediction of patient's progress: MODERATE COMPLEXITY                 TREATMENT:   (In addition to Assessment/Re-Assessment sessions the following treatments were rendered)   Pre-treatment Symptoms/Complaints:  Pt. Trying to get back to bed upon arrival.   Pain: Initial:   Pain Intensity 1: 0  Post Session:  No change      Therapeutic Activity: (  15 minutes): Therapeutic activities including Chair transfers and Ambulation on level ground to improve mobility and strength. Required minimal min A to promote dynamic balance in standing. Also provided pt. Education to pt, son, & daughter regarding rehab options post-d/c. Braces/Orthotics/Lines/Etc:   · IV  · nsyder catheter  · drain HP  · nasogastric tube  · O2 Device: Nasal cannula  Treatment/Session Assessment:    · Response to Treatment:  No c/o pain, pt. Reports feeling fatigued  · Interdisciplinary Collaboration:  · Physical Therapist, Registered Nurse and Rehabilitation Attendant  · After treatment position/precautions:  · Up in chair, Bed/Chair-wheels locked, Bed in low position, Call light within reach, RN notified and Family at bedside  · Compliance with Program/Exercises:  Will assess as treatment progresses. · Recommendations/Intent for next treatment session: \"Next visit will focus on advancements to more challenging activities and reduction in assistance provided\".   Total Treatment Duration:PT Patient Time In/Time Out  Time In: 7251  Time Out: [address], PT

## 2017-10-07 LAB
ALBUMIN SERPL-MCNC: 2.2 G/DL (ref 3.2–4.6)
ALBUMIN/GLOB SERPL: 0.5 {RATIO} (ref 1.2–3.5)
ALP SERPL-CCNC: 92 U/L (ref 50–136)
ALT SERPL-CCNC: 26 U/L (ref 12–65)
ANION GAP SERPL CALC-SCNC: 7 MMOL/L (ref 7–16)
AST SERPL-CCNC: 20 U/L (ref 15–37)
BILIRUB SERPL-MCNC: 0.3 MG/DL (ref 0.2–1.1)
BUN SERPL-MCNC: 13 MG/DL (ref 8–23)
CALCIUM SERPL-MCNC: 8.3 MG/DL (ref 8.3–10.4)
CHLORIDE SERPL-SCNC: 105 MMOL/L (ref 98–107)
CO2 SERPL-SCNC: 28 MMOL/L (ref 21–32)
CREAT SERPL-MCNC: 0.48 MG/DL (ref 0.6–1)
ERYTHROCYTE [DISTWIDTH] IN BLOOD BY AUTOMATED COUNT: 17.7 % (ref 11.9–14.6)
GLOBULIN SER CALC-MCNC: 4.3 G/DL (ref 2.3–3.5)
GLUCOSE SERPL-MCNC: 114 MG/DL (ref 65–100)
HCT VFR BLD AUTO: 29.8 % (ref 35.8–46.3)
HGB BLD-MCNC: 9.2 G/DL (ref 11.7–15.4)
MCH RBC QN AUTO: 24.7 PG (ref 26.1–32.9)
MCHC RBC AUTO-ENTMCNC: 30.9 G/DL (ref 31.4–35)
MCV RBC AUTO: 80.1 FL (ref 79.6–97.8)
PHOSPHATE SERPL-MCNC: 3 MG/DL (ref 2.3–3.7)
PLATELET # BLD AUTO: 217 K/UL (ref 150–450)
PMV BLD AUTO: 10.5 FL (ref 10.8–14.1)
POTASSIUM SERPL-SCNC: 3.8 MMOL/L (ref 3.5–5.1)
PROT SERPL-MCNC: 6.5 G/DL (ref 6.3–8.2)
RBC # BLD AUTO: 3.72 M/UL (ref 4.05–5.25)
SODIUM SERPL-SCNC: 140 MMOL/L (ref 136–145)
WBC # BLD AUTO: 3.7 K/UL (ref 4.3–11.1)

## 2017-10-07 PROCEDURE — 65270000029 HC RM PRIVATE

## 2017-10-07 PROCEDURE — 74011000258 HC RX REV CODE- 258: Performed by: SURGERY

## 2017-10-07 PROCEDURE — 74011000250 HC RX REV CODE- 250: Performed by: SURGERY

## 2017-10-07 PROCEDURE — 85027 COMPLETE CBC AUTOMATED: CPT | Performed by: SURGERY

## 2017-10-07 PROCEDURE — 84100 ASSAY OF PHOSPHORUS: CPT | Performed by: SURGERY

## 2017-10-07 PROCEDURE — 74011250637 HC RX REV CODE- 250/637: Performed by: NURSE PRACTITIONER

## 2017-10-07 PROCEDURE — 74011250636 HC RX REV CODE- 250/636: Performed by: SURGERY

## 2017-10-07 PROCEDURE — 80053 COMPREHEN METABOLIC PANEL: CPT | Performed by: SURGERY

## 2017-10-07 RX ORDER — FLUCONAZOLE 2 MG/ML
400 INJECTION, SOLUTION INTRAVENOUS EVERY 24 HOURS
Status: DISCONTINUED | OUTPATIENT
Start: 2017-10-07 | End: 2017-10-10 | Stop reason: HOSPADM

## 2017-10-07 RX ADMIN — CIPROFLOXACIN 400 MG: 2 INJECTION, SOLUTION INTRAVENOUS at 09:28

## 2017-10-07 RX ADMIN — ENOXAPARIN SODIUM 40 MG: 40 INJECTION SUBCUTANEOUS at 19:38

## 2017-10-07 RX ADMIN — Medication 5 ML: at 18:36

## 2017-10-07 RX ADMIN — POTASSIUM CHLORIDE: 2 INJECTION, SOLUTION, CONCENTRATE INTRAVENOUS at 18:35

## 2017-10-07 RX ADMIN — METRONIDAZOLE 500 MG: 500 INJECTION, SOLUTION INTRAVENOUS at 09:27

## 2017-10-07 RX ADMIN — Medication 5 ML: at 15:20

## 2017-10-07 RX ADMIN — Medication 600 UNITS: at 06:07

## 2017-10-07 RX ADMIN — Medication 600 UNITS: at 15:20

## 2017-10-07 RX ADMIN — FLUCONAZOLE 400 MG: 2 INJECTION INTRAVENOUS at 21:11

## 2017-10-07 RX ADMIN — ONDANSETRON 4 MG: 2 INJECTION INTRAMUSCULAR; INTRAVENOUS at 10:27

## 2017-10-07 RX ADMIN — METRONIDAZOLE 500 MG: 500 INJECTION, SOLUTION INTRAVENOUS at 20:07

## 2017-10-07 RX ADMIN — CIPROFLOXACIN 400 MG: 2 INJECTION, SOLUTION INTRAVENOUS at 20:46

## 2017-10-07 RX ADMIN — ENOXAPARIN SODIUM 40 MG: 40 INJECTION SUBCUTANEOUS at 08:13

## 2017-10-07 RX ADMIN — SOYBEAN OIL 250 ML: 20 INJECTION, SOLUTION INTRAVENOUS at 18:34

## 2017-10-07 RX ADMIN — Medication 5 ML: at 23:50

## 2017-10-07 RX ADMIN — Medication 600 UNITS: at 21:12

## 2017-10-07 RX ADMIN — SODIUM CHLORIDE 12.5 MG: 9 INJECTION INTRAMUSCULAR; INTRAVENOUS; SUBCUTANEOUS at 19:37

## 2017-10-07 NOTE — PROGRESS NOTES
END OF SHIFT NOTE:    INTAKE/OUTPUT  10/06 0701 - 10/07 0700  In: 2595.3 [I.V.:2595.3]  Out: 7888 [Urine:2650; Drains:35]  Voiding: YES  Catheter: YES  Drain:   Jimbo-Castañeda Drain 10/03/17 Right; Lower Abdomen (Active)   Site Assessment Clean, dry, & intact 10/6/2017  7:12 PM   Dressing Status Clean, dry, & intact 10/6/2017  7:12 PM   Status Patent; Charged;Draining 10/6/2017  7:12 PM   Drainage Color Serosanguinous 10/6/2017  7:12 PM   Output (ml) 10 ml 10/7/2017  3:00 AM               Flatus: Patient does not have flatus present. Stool:  0 occurrences. Characteristics:  Stool Assessment  Stool Color: Tan (Comment)  Stool Appearance: Loose  Stool Amount: Medium  Stool Source/Status: Rectum    Emesis: 0 occurrences. Characteristics:        VITAL SIGNS  Patient Vitals for the past 12 hrs:   Temp Pulse Resp BP SpO2   10/07/17 0300 98.4 °F (36.9 °C) 85 15 101/61 93 %   10/06/17 2307 (!) 100.8 °F (38.2 °C) (!) 127 15 106/63 92 %   10/06/17 1912 (!) 102.8 °F (39.3 °C) (!) 105 15 144/85 95 %       Pain Assessment  Pain Intensity 1: 0 (10/07/17 0000)  Pain Location 1: Abdomen  Pain Intervention(s) 1: Encouraged PCA  Patient Stated Pain Goal: 0    Ambulating  Yes    Shift report given to oncoming nurse at the bedside.     Stef Smallwood RN

## 2017-10-07 NOTE — OP NOTES
[address] 65   OPERATIVE REPORT       Name:  Rebecca Allred   MR#:  [id number]   :  1940   Account #:  [de-identified]   Date of Adm:  2017       DATE OF SURGERY: 10/03/2017. PREOPERATIVE DIAGNOSES    1. Large right lower quadrant phlegmon involving bowel, of   undetermined origin, refractory to treatment with bowel rest,   total parenteral nutrition, and intravenous antibiotics. 2. Localized peritonitis. 3. 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 4. Morbid obesity. POSTOPERATIVE DIAGNOSES   1. Large right lower quadrant phlegmon involving bowel, of   undetermined origin, refractory to treatment with bowel rest,   total parenteral nutrition, and intravenous antibiotics. 2. Localized peritonitis. 3. 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 4. Morbid obesity. PROCEDURE   1. Open drainage of localized peritonitis. 2. Right hemicolectomy with primary anastomosis. 3. Open excision of peritoneal tumor and open incisional biopsy   of left retroperitoneal tumor for diagnosis. SURGEON: Jovany Faye. Stephy Harris MD.      ASSISTANTS: None. ANESTHESIA: General.    COMPLICATIONS: None. ESTIMATED BLOOD LOSS: Less than 100 mL. SPECIMENS    1. Right colon, peritoneal nodular tumor, and left   retroperitoneal adipose biopsies, all sent to Pathology for   review. 2. Culture of localized peritonitis sent to Microbiology. OPERATIVE PROCEDURE: The patient was taken to the operating   room, placed in the supine position and after adequate   anesthesia was given, her abdomen was prepped and draped in a   sterile fashion with multiple layers of Betadine scrub and   Betadine solution. An NG tube and Torres catheter were placed. Time-out protocol was performed. She was on standing   antibiotics. She was opened through a midline incision.    Dissection through some adhesions to the right lower quadrant   was performed. There was a large phlegmon present. We dissected   between the peritoneum and bowel and encountered an area of   localized peritonitis with purulent fluid. We performed open   drainage of the area of localized peritonitis and sent a culture   to Microbiology. Some sharp debridement was performed. There was   evidence for tissue necrosis. Once we had adequately drained and debrided the area of   localized peritonitis, we continued with mobilization of the   very large phlegmon attached to the retroperitoneum. This   required a lot of tedious dissection. We could identify the   right ovary and right fallopian tube, which did not appear to be   involved with any evidence of malignancy but were pulled into   the inflammatory process and looked not to be the primary source   of origin. Careful dissection as we rolled the cecum from the   retroperitoneum was used in order to avoid injury to the right   ureter. There was a very hard mass involving the cecum in the   expected location of the appendix. The appendix was hard to   identify. The patient had been in pain for approximately 1 month   before she was admitted from the emergency room. It was   difficult to tell operatively whether she had a neglected   appendicitis which had burst and become ischemic and gangrenous   with sequelae of the phlegmon or whether she had an appendiceal   carcinoma or whether she had a cecal carcinoma. The cecum was   quite firm and hard, and it was clearly needed to excise this   tissue to rule out malignancy and definitively treat the   phlegmon. A JAILYN stapler was used to divide the terminal ileum as   it entered the cecum. There was no evidence of Crohn disease. The right colon was mobilized as well as the hepatic flexure. The proximal transverse colon was divided just proximal to the   large transverse mesocolon artery feeding the transverse colon.    The mesentery of the cecum and ascending colon had very large   lymph nodes within it. These nodes were included in our   resection. We divided the mesentery of the right colon with the   LigaSure. Great care was taken not to injure the right ureter. The specimen was sent to Pathology for review. After the right colectomy had been performed, we then performed   a side-to-side anastomosis of the ileum to the proximal   transverse colon with an 80 mm blue JAILYN stapler. The defect left   by the stapler was closed with a TA stapler. The crotch of the   anastomosis was reinforced with seromuscular 3-0 silk sutures. Irrigation was used over the staple line. Portions of the staple   line were inverted with seromuscular Lembert sutures, and the   mesenteric defect was closed with silk suture in an interrupted   fashion as well. We then turned our attention to the right lower quadrant where   the phlegmon had been mobilized and we identified a nodular   tumor-like structure on the retroperitoneum, which was excised   for definitive pathologic review to rule out occult carcinoma   versus ischemic fibrosis from the phlegmon. This tissue was   excised and sent as a separate specimen to Pathology. Once we excised the peritoneal tumor which was less than 2 cm in   size, we turned our attention to the left retroperitoneum where   on preoperative CT imaging, there was a very large fatty tumor   involving the left kidney, left retroperitoneum and extending   through the pelvis and into the groin. We could palpate and feel   this mass. It was clearly composed of adipose tissue. In order   to rule out liposarcoma, we opened the left retroperitoneum with   a small incision and took a generous incisional biopsy of this   adipose tissue and sent this to Pathology for review to rule out   liposarcoma.  Any reader of this operative note is referred to   the CT images for direct examination of what I am describing,   but there was a large retroperitoneal tumor noted there prior to   surgery. We closed the left retroperitoneal incision with Vicryl   sutures and irrigated the entire abdomen. The small bowel was   placed in its normal anatomic position. The Torres could be   palpated in the bladder and the NG tube palpated in the stomach. There was no other evidence of active disease. Irrigation was   used, and hemostasis was confirmed. The fascia was closed with   running #1 PDS suture. The subcutaneous adipose was irrigated,   and the skin was closed with clips. Prior to closure, a 23 round   STELLA drain was brought out through a counter incision in the right   lower quadrant and placed in through the right lower quadrant   and into the pelvis for drainage of the area where there was   localized peritonitis and also included drainage of the pelvis. The patient tolerated the procedure well. There were no   immediate complications.         MD BRYANNA Miguel / Ragini Knowles   D:  10/06/2017   18:43   T:  10/06/2017   23:04   Job #:  [id number]

## 2017-10-07 NOTE — PROGRESS NOTES
Call to Dr. Shade Mulligan regarding increased heart rate and increased temperature. Orders received for blood cultures, lactic acid, and tylenol suppository.

## 2017-10-07 NOTE — PROGRESS NOTES
H&P/Consult Note/Progress Note/Office Note:   Emily Payne  MRN: [id number]  :1940  Age:76 y.o.    HPI: Sindy TORRES 65 y. o. female who is s/p right hemicolectomy for a RLQ phlegmon involving bowel and benign inflammatory cecal mass with drainage of localized RLQ pseudomonas abscess. She also had biopsy of a left retroperitoneal fatty tumor noted in pre-op CT. This was done after about 2 weeks of bowel rest IV Abx, and TPN failed to improve the RLQ phlegmon on serial CT imaging    Path was benign  DIAGNOSIS   A: Janyth Ohms: FIBROSIS WITH CHRONIC INFLAMMATION. B: RIGHT COLON WITH CECAL MASS: SEROSAL CHRONIC INFLAMMATION OF THE APPENDIX AND MILD ACUTE SEROSITIS INVOLVING THE CECUM; FOUR BENIGN, REACTIVE LYMPH NODES. C: RETROPERITONEAL MASS: BENIGN FIBROADIPOSE TISSUE. Electronically signed out on 10/5/2017 12:48 by Altagracia Gonzalez. Praveen Serrano MD   Procedures/Addenda   Microscopic Description   A: The specimen consists of a focus of fibrosis involving fibroadipose tissue. Mild chronic inflammation is seen in this area. No atypia is seen. B: Sections of the appendix show chronic serosal inflammation and mild fibrosis. The cecum has areas of acute serositis. No other significant histologic abnormalities identified in sections of the colon. The lymph nodes appear reactive with follicular hyperplasia. No significant atypia seen. C: The specimen consists of benign fibroadipose tissue. No atypia is seen. Dr. Donnie Jones concurs. Prior to surgery she originally presented to and was admitted form the ER with a 1 month h/o severe (8/10) RLQ pain.    This was progressively worsening and associated with intermittent N/V. Nothing made it better or worse. She was treated previously with laxatives without improvement. She came [email or web address] ER about 5 days prior to admission and was treated with antibiotics for UTI.  No imaging was done.   She came back to the ER on the day of admission and CT scan was obtained as shown below. She is s/p lap francois at [facility] in approx 2012.    She is s/p hysterectomy about 40 yrs ago for benign fibroids. Clariat Tapian thinks her ovaries remain. She denies associated bloody BMs or diarrhea.            Results for Sharyne Saint (MRN [id number]) as of 9/19/2017 09:35     Ref. Range 9/18/2017 16:42   CEA Latest Ref Range: 0.0 - 3.0 ng/mL 0.8   CA-125 Latest Ref Range: 1.5 - 35.0 U/mL 5           9/18/17 CT abd/pelvis with contrast with IV but not oral contrast   Minimal linear atelectasis or scarring is present in the lung bases, left greater than right. There is a small to moderate-sized hiatal hernia. No pleural or pericardial fluid.      Small hypoattenuating lesions are present in the right hepatic lobe and left kidney, too small to definitively characterize but statistically likely  representing cysts. There is central biliary ductal ectasia s/p cholecystectomy.      The remaining solid abdominal viscera are unremarkable to the limits of  noncontrast CT. Atherosclerotic abdominal aorta and branch vessels. No upper  abdominal no retroperitoneal lymph node enlargement. Nonopacified small bowel  loops are normal in caliber.      Pelvis: There is somewhat prominent lipomatosis along the left posterior  pararenal fascia and iliopsoas without associated soft tissue component,  measuring up to 12.0 x 23 x 9 cm. The bladder is predominantly decompressed. Uterus not visualized.      Extensive diverticular changes are present in the colon which is otherwise  normal in caliber. There is a large ill-defined inflammatory process in the  right lower quadrant measuring 5.9 x 10.0 x 6.4 cm approximating the ileocecal  junction from which the appendix is not visualized as a distinctly separate  structure. Multiple high attenuation foci are present within or adjacent to the  bowel, suspicious for medication and/or enteroliths.  There is moderate adjacent  inflammatory fat stranding and multiple prominent right lower quadrant  mesenteric lymph nodes, the largest of which measures 1.2 cm in short axis.      Trace dependent free pelvic fluid. No aggressive osseous lesions.      IMPRESSION:  Nonspecific inflammatory process in RLQ at the ileocecal junction. Diff Dx include infectious, and ischemic colitis, appendicitis, inflammatory bowel disease, omental infarct.       Large extraperitoneal predominantly fatty attenuation mass without definite enhancing soft tissue. Size places the patient at risk for sarcoma, the inferior extent of which is not included in the field of view.        9/25/17 CT abd/pelvis with oral and IV contrast   Tiny hypodense lesion near the dome of the liver and another seen within the right posterior lobe. These are too small to further characterize. Otherwise, the liver and spleen enhances homogeneously without discrete lesions. There is no biliary ductal dilatation. Clips are present from prior cholecystectomy. The pancreas and adrenal glands are normal. The kidneys  enhance symmetrically. Bowel loops in the upper abdomen are normal. No definite upper abdominal lymphadenopathy seen.     The RLQ phlegmon seen previously is again noted with significant bowel wall thickening involving the cecum and distal ileum. There is adjacent fat stranding. The large extraperitoneal fatty mass discussed previously is again noted and is unchanged. The bladder and rectum are normal.  No pelvic adenopathy seen. No free air or free fluid seen within the pelvis.       IMPRESSION: No significant interval change when compared with the prior study. 10/2/17 CT abd/pelvis with oral and IV contrast  There is scarring within the lingula with mild bibasilar atelectasis. There is a hiatal hernia present.     CT abdomen: The liver and spleen enhances homogeneously without discrete lesions. There is no biliary ductal dilatation. Clips are present from prior cholecystectomy.  The pancreas and adrenal glands are normal. The kidneys enhance  symmetrically. Bowel loops in the upper abdomen are normal. No definite upper abdominal lymphadenopathy seen.     CT pelvis: No change in the appearance of the large fat density mass within the retroperitoneum on the left. In addition, the right lower quadrant phlegmon with bowel wall thickening is again seen and is unchanged. There is extensive  diverticulosis. The bladder and rectum are normal. No pelvic adenopathy seen. No free air or free fluid seen within the pelvis. Bone window evaluation demonstrates no aggressive osseous lesions.     IMPRESSION: No significant interval change when compared with the prior study.           9/19/17 Abd pain unchanged; Mass-effect in RLQ; PICC requested; on IV ABx; NPO  9/20/17 PICC placed yesterday; on TPN; Nausea persists;  RLQ pain no better since admission and still 8/10  Requests adult diapers secondary to urinary incontinence  9/21/17 says her pain is 8/10 but looks comfortable  9/22/17 pain issues improving  9/23/17 Awake in bed. No new complaints. 9/24/17 Resting in bed. No new complaints. Family at bedside.   9/25/17 Overall improvement in RLQ pain compared to admission  9/26/17 reports RLQ abd pain better since admission  9/27/17 no changes  9/28/17 she feels like RX starting to work with less RLQ pain  9/29/17 slow but steady improvement clinically; still with RLQ pain  9/30/17 RLQ pain better compared to admission  10/1/17 she reports pain 4/10 for only about 5hrs total during the day compared to 8/10 pain 24hrs a day at admission  10/2/17 worsening  Pain now is suprapubic region and LLQ; follow-up CT pending  10/3/17 CT images reviewed, decided to proceed with laparotomy--->OR    10/4/17 POD1; doing OK; correct hypophosphatemia; resume TPN no complaints  10/5/17 POD2 WBC normal; she pulled out her NGT  10/6/17 POD3 fever; wound OK; OOB today; encouraging inc fidelina  10/7/17 POD4 fevers; bld cultures pending; Torres out; she wants rehab bed          Past Medical History:   Diagnosis Date    Anxiety state, unspecified     GERD (gastroesophageal reflux disease)     Hypercholesterolemia      Past Surgical History:   Procedure Laterality Date    HX CHOLECYSTECTOMY      HX COLONOSCOPY      Dr Neto Kevin 2012, repeat 5 years    HX GYN      hysterectomy, partial    HX KNEE REPLACEMENT      right     Current Facility-Administered Medications   Medication Dose Route Frequency    ciprofloxacin (CIPRO) 400 mg IVPB (premix)  400 mg IntraVENous Q12H    metroNIDAZOLE (FLAGYL) IVPB premix 500 mg  500 mg IntraVENous Q12H    TPN ADULT - dextrose 10% amino acid 4.25%   IntraVENous QPM    acetaminophen (TYLENOL) suppository 650 mg  650 mg Rectal Q4H PRN    fat emulsion 20% (LIPOSYN, INTRALIPID) infusion 250 mL  250 mL IntraVENous QPM    diphenhydrAMINE (BENADRYL) injection 25 mg  25 mg IntraVENous Q8H PRN    sodium chloride (NS) flush 5-10 mL  5-10 mL IntraVENous PRN    HYDROmorphone (DILAUDID) 20 mg / 20 mL PCA   IntraVENous CONTINUOUS    enoxaparin (LOVENOX) injection 40 mg  40 mg SubCUTAneous Q12H    ondansetron (ZOFRAN) injection 4 mg  4 mg IntraVENous Q4H PRN    promethazine (PHENERGAN) with saline injection 12.5 mg  12.5 mg IntraVENous Q4H PRN    NUTRITIONAL SUPPORT ELECTROLYTE PRN ORDERS   Does Not Apply PRN    heparin (porcine) pf 600 Units  600 Units InterCATHeter Q8H     Morphine  Social History     Social History    Marital status:      Spouse name: N/A    Number of children: N/A    Years of education: N/A     Social History Main Topics    Smoking status: Former Smoker     Quit date: 1/1/1975    Smokeless tobacco: Never Used    Alcohol use No    Drug use: No    Sexual activity: Not Asked     Other Topics Concern    None     Social History Narrative     History   Smoking Status    Former Smoker    Quit date: 1/1/1975   Smokeless Tobacco    Never Used     Family History   Problem Relation Age of Onset    Diabetes Mother     Heart Disease Mother     Thyroid Disease Mother     Heart Disease Father      ROS:  Comprehensive review of systems was otherwise unremarkable except as noted above. Physical Exam:   Visit Vitals    /49    Pulse (!) 116    Temp 99.9 °F (37.7 °C)    Resp 16    Ht 5' 4\" (1.626 m)    Wt 246 lb 12.8 oz (111.9 kg)    SpO2 94%    BMI 42.36 kg/m2     Constitutional: Alert, oriented, cooperative patient in no acute distress; appears stated age    Eyes:Sclera are clear. EOMs intact  ENMT: no external lesions gross hearing normal; no obvious neck masses, no ear or lip lesions, nares normal  CV: RRR. Normal perfusion  Resp: No JVD.  Breathing is  non-labored; no audible wheezing.    GI: obese; Midline incision without cellulitis;  RLQ and pelvic STELLA drain is serosang  Torres with light yellow urine  Musculoskeletal: unremarkable with normal function. No embolic signs or cyanosis.    Neuro:  Oriented; moves all 4; no focal deficits  Psychiatric: normal affect and mood, no memory impairment    Recent vitals (if inpt):  Patient Vitals for the past 24 hrs:   BP Temp Pulse Resp SpO2 Weight   10/07/17 0630 111/49 99.9 °F (37.7 °C) (!) 116 16 94 % -   10/07/17 0504 - - - - - 246 lb 12.8 oz (111.9 kg)   10/07/17 0300 101/61 98.4 °F (36.9 °C) 85 15 93 % -   10/06/17 2307 106/63 (!) 100.8 °F (38.2 °C) (!) 127 15 92 % -   10/06/17 1912 144/85 (!) 102.8 °F (39.3 °C) (!) 105 15 95 % -   10/06/17 1536 116/59 99.9 °F (37.7 °C) (!) 127 19 94 % -   10/06/17 1052 121/71 99.6 °F (37.6 °C) (!) 127 19 94 % -       Labs:  Recent Labs      10/07/17   0620   WBC  3.7*   HGB  9.2*   PLT  217   NA  140   K  3.8   CL  105   CO2  28   BUN  13   CREA  0.48*   GLU  114*   TBILI  0.3   SGOT  20   ALT  26   AP  92       Lab Results   Component Value Date/Time    WBC 3.7 10/07/2017 06:20 AM    HGB 9.2 10/07/2017 06:20 AM    PLATELET 403 24/95/6356 06:20 AM    Sodium 140 10/07/2017 06:20 AM    Potassium 3.8 10/07/2017 06:20 AM    Chloride 105 10/07/2017 06:20 AM    CO2 28 10/07/2017 06:20 AM    BUN 13 10/07/2017 06:20 AM    Creatinine 0.48 10/07/2017 06:20 AM    Glucose 114 10/07/2017 06:20 AM    Bilirubin, total 0.3 10/07/2017 06:20 AM    AST (SGOT) 20 10/07/2017 06:20 AM    ALT (SGPT) 26 10/07/2017 06:20 AM    Alk. phosphatase 92 10/07/2017 06:20 AM       I reviewed recent labs and recent radiologic studies. I independently reviewed radiology images for studies I described above or studies I have ordered. Admission date (for inpatients): 9/18/2017   * No surgery found *  Procedure(s):  EXPLORATORY LAPAROTOMY, EXCISION OF PERITONEAL NODULE, RIGHT COLECTOMY.     ASSESSMENT/PLAN:  Problem List  Date Reviewed: 10/3/2017          Codes Class Noted    RLQ abdominal phlegmon ICD-10-CM: L02.91  ICD-9-CM: 682.9  10/4/2017        Localized peritonitis ([facility] [address] ICD-10-CM: K65.9  ICD-9-CM: 567.9  10/4/2017        Mass of cecum ICD-10-CM: K63.9  ICD-9-CM: 569.9  10/4/2017        Massive Left Retroperitoneal fatty mass seen on CT scan extending from kidney to groin ICD-10-CM: R19.00  ICD-9-CM: 789.30  9/22/2017        Acute cystitis ICD-10-CM: N30.00  ICD-9-CM: 595.0  9/19/2017        RLQ abdominal mass ICD-10-CM: R19.03  ICD-9-CM: 789.33  9/19/2017        RLQ abdominal pain ICD-10-CM: R10.31  ICD-9-CM: 789.03  9/19/2017        Leukocytosis ICD-10-CM: D72.829  ICD-9-CM: 288.60  9/19/2017        Non-intractable vomiting with nausea ICD-10-CM: R11.2  ICD-9-CM: 787.01  9/19/2017        * (Principal)Intra-abdominal infection ICD-10-CM: B99.9  ICD-9-CM: 136.9  9/18/2017        Anxiety state, unspecified ICD-10-CM: F41.1  ICD-9-CM: 300.00  Unknown        GERD (gastroesophageal reflux disease) ICD-10-CM: K21.9  ICD-9-CM: 530.81  Unknown        Hypercholesterolemia ICD-10-CM: E78.00  ICD-9-CM: 272.0  Unknown            Principal Problem:    Intra-abdominal infection (9/18/2017)    Active Problems:    Acute cystitis (9/19/2017)      CANDACE abdominal mass (9/19/2017)      RLQ abdominal pain (9/19/2017)      Leukocytosis (9/19/2017)      Non-intractable vomiting with nausea (9/19/2017)      Massive Left Retroperitoneal fatty mass seen on CT scan extending from kidney to groin (9/22/2017)      RLQ abdominal phlegmon (10/4/2017)      Localized peritonitis ([address] (10/4/2017)      Mass of cecum (10/4/2017)       POD4 right colectomy  Fevers daily-->peritoneal cultures growing pseudomonas (intermediate sen to zosyn but sen to quinolones)  Changed IV zosyn to IV cipro/flagyl on 10/6/17  Blood cultures    NPO/TPN  Await return of bowel function  Torres out 10/71/7  OOB qday  Empty STELLA PRN  Inc fidelina      GI (pepcid- add to TPN) and VTE (lovenox) prophylaxis ordered    Consult case manage for SNF/rehab bed

## 2017-10-07 NOTE — PROGRESS NOTES
Problem: Nutrition Deficit  Goal: *Optimize nutritional status  Nutrition F/U   TPN management (Dr. Jay To)   Assessment:   Diet order(s): 9-18: NPO  Central line access: Double lumen PICC  TPN dependent S/P excision of peritoneal node, right colectomy and drainage of peritonitis (10-4). Pertinent Labs:  Phos 3.0-trending up with increased phos to 30 meq/L or ~61 meq/d in TPN last nights TPN and 20 mmol NaPhos bolus yesterday  K 3.8- trending up with increased K+ to 50 meq/L or ~102 meq/d in last nights TPN, also decreased losses associated with NGT removal.  Anthropometrics:Height: 5' 4\",  Weight: 111.9 kg, Weight Source: Bed. No noted edema  Macronutrient needs:  EER: 0008-6204 kcal /day (13-18 kcal/kg actual BW)   EPR: 65-82 grams protein/day (1.2-1.5 grams/kg IBW). Dr Skyler Ortiz standard request is 2 gm/kg MGA=521 g PRO/d)   Max CHO: 314 grams/day (4mg/kg IBW/min)   Fluid: 1ml/kcal   Intake/Comparative Standards: 2L/d of 10%DEX/ 4.25%AA at 85 ml/hr with 250 ml 20% Lipids daily provides 1520 kcal/d (100% of needs), 85 grams of protein/d (1.56 grams of protein/kg of IBW), 200 grams of CHO/d (does not exceed maximum CHO load) and ~2300 ml of total volume/d (100% of needs). Intervention:   Meals and snacks: NPO. Await diet initiation and progression. Nutritional Supplement Therapy: Electrolyte replacement per nutritional support protocols are active on MAR.    PN: No changes today  If 2 grams protein/kg is desired for this pt, this could be met with 10%DEX/ 4.25%AA at 105 ml/hr with 250 ml 20% Lipids daily provides 1785 kcal/d (100% of needs), 107 grams of protein/d (1.96 grams of protein/kg of IBW), 252 grams of CHO/d (does not exceed maximum CHO load) and ~2770 ml of total volume/d (>100% of needs) vs 2L/d of 15%DEX/5%AA at 85 ml/hr with 250 ml 20% Lipids daily provides 1920 kcal/d (85% of needs), 100 grams of protein/d (1.83 grams of protein/kg of IBW),300 grams of CHO/d (does not exceed maximum CHO load) and ~2300 ml of total volume/d (>100% of needs)     Sierra Hsieh As[address], [address], [phone]

## 2017-10-07 NOTE — PROGRESS NOTES
END OF SHIFT NOTE:    INTAKE/OUTPUT  10/06 0701 - 10/07 0700  In: 2924.3 [I.V.:2924.3]  Out: 0666 [Urine:2850; Drains:35]  Voiding: YES  Catheter: NO  Drain:   Jimbo-Castañeda Drain 10/03/17 Right; Lower Abdomen (Active)   Site Assessment Clean, dry, & intact 10/6/2017  7:12 PM   Dressing Status Clean, dry, & intact 10/6/2017  7:12 PM   Status Patent; Charged;Draining 10/6/2017  7:12 PM   Drainage Color Serosanguinous 10/6/2017  7:12 PM   Output (ml) 10 ml 10/7/2017  3:00 AM               Flatus: Patient does not have flatus present. Stool:  0 occurrences. Characteristics:  Stool Assessment  Stool Color: Tan (Comment)  Stool Appearance: Loose  Stool Amount: Medium  Stool Source/Status: Rectum    Emesis: 0 occurrences. Characteristics:        VITAL SIGNS  Patient Vitals for the past 12 hrs:   Temp Pulse Resp BP SpO2   10/07/17 1530 99.1 °F (37.3 °C) (!) 123 17 97/60 -   10/07/17 1441 99.9 °F (37.7 °C) - - - -   10/07/17 1115 99.7 °F (37.6 °C) (!) 105 15 114/52 93 %       Pain Assessment  Pain Intensity 1: 0 (10/07/17 0000)  Pain Location 1: Abdomen  Pain Intervention(s) 1: Encouraged PCA  Patient Stated Pain Goal: 0    Ambulating  No    Shift report given to oncoming nurse at the bedside. No flatus today but not burping either.     Rashid Galdamez RN

## 2017-10-07 NOTE — PROGRESS NOTES
END OF SHIFT NOTE:    INTAKE/OUTPUT  10/05 0701 - 10/06 0700  In: 2736.8 [I.V.:2736.8]  Out: 1205 [Urine:1450; Drains:35]  Voiding: NO  Catheter: YES  Drain:   Jimbo-Castañeda Drain 10/03/17 Right; Lower Abdomen (Active)   Site Assessment Clean, dry, & intact 10/6/2017  1:28 AM   Dressing Status Clean, dry, & intact 10/6/2017  1:28 AM   Status Patent; Charged;Draining;Suction (specify 10/6/2017  1:28 AM   Drainage Color Serosanguinous 10/6/2017  1:28 AM   Output (ml) 25 ml 10/6/2017  4:00 AM               Flatus: Patient does not have flatus present. Stool:  0 occurrences. Characteristics:  Stool Assessment  Stool Color: Tan (Comment)  Stool Appearance: Loose  Stool Amount: Medium  Stool Source/Status: Rectum    Emesis: 0 occurrences. Characteristics:        VITAL SIGNS  Patient Vitals for the past 12 hrs:   Temp Pulse Resp BP SpO2   10/06/17 1912 (!) 102.8 °F (39.3 °C) (!) 105 15 144/85 95 %   10/06/17 1536 99.9 °F (37.7 °C) (!) 127 19 116/59 94 %   10/06/17 1052 99.6 °F (37.6 °C) (!) 127 19 121/71 94 %       Pain Assessment  Pain Intensity 1: 0 (10/06/17 1500)  Pain Location 1: Abdomen  Pain Intervention(s) 1: Encouraged PCA  Patient Stated Pain Goal: 0    Ambulating  Yes    Shift report given to oncoming nurse at the bedside. Participated with physical therapy and ambulated in hallway.     Sridhar Peterson RN

## 2017-10-08 LAB
ANION GAP SERPL CALC-SCNC: 8 MMOL/L (ref 7–16)
BUN SERPL-MCNC: 13 MG/DL (ref 8–23)
CALCIUM SERPL-MCNC: 8.2 MG/DL (ref 8.3–10.4)
CHLORIDE SERPL-SCNC: 103 MMOL/L (ref 98–107)
CO2 SERPL-SCNC: 26 MMOL/L (ref 21–32)
CREAT SERPL-MCNC: 0.52 MG/DL (ref 0.6–1)
ERYTHROCYTE [DISTWIDTH] IN BLOOD BY AUTOMATED COUNT: 17.5 % (ref 11.9–14.6)
GLUCOSE SERPL-MCNC: 126 MG/DL (ref 65–100)
HCT VFR BLD AUTO: 27.3 % (ref 35.8–46.3)
HGB BLD-MCNC: 8.4 G/DL (ref 11.7–15.4)
MCH RBC QN AUTO: 24.9 PG (ref 26.1–32.9)
MCHC RBC AUTO-ENTMCNC: 30.8 G/DL (ref 31.4–35)
MCV RBC AUTO: 81 FL (ref 79.6–97.8)
PLATELET # BLD AUTO: 191 K/UL (ref 150–450)
PMV BLD AUTO: 10.8 FL (ref 10.8–14.1)
POTASSIUM SERPL-SCNC: 3.8 MMOL/L (ref 3.5–5.1)
RBC # BLD AUTO: 3.37 M/UL (ref 4.05–5.25)
SODIUM SERPL-SCNC: 137 MMOL/L (ref 136–145)
WBC # BLD AUTO: 3.9 K/UL (ref 4.3–11.1)

## 2017-10-08 PROCEDURE — 74011250637 HC RX REV CODE- 250/637: Performed by: SURGERY

## 2017-10-08 PROCEDURE — 74011250636 HC RX REV CODE- 250/636: Performed by: SURGERY

## 2017-10-08 PROCEDURE — 94760 N-INVAS EAR/PLS OXIMETRY 1: CPT

## 2017-10-08 PROCEDURE — 74011250636 HC RX REV CODE- 250/636: Performed by: NURSE PRACTITIONER

## 2017-10-08 PROCEDURE — 65270000029 HC RM PRIVATE

## 2017-10-08 PROCEDURE — 74011000250 HC RX REV CODE- 250: Performed by: SURGERY

## 2017-10-08 PROCEDURE — 74011250637 HC RX REV CODE- 250/637: Performed by: NURSE PRACTITIONER

## 2017-10-08 PROCEDURE — 85027 COMPLETE CBC AUTOMATED: CPT | Performed by: SURGERY

## 2017-10-08 PROCEDURE — 80048 BASIC METABOLIC PNL TOTAL CA: CPT | Performed by: SURGERY

## 2017-10-08 RX ORDER — PANTOPRAZOLE SODIUM 40 MG/1
40 TABLET, DELAYED RELEASE ORAL
Status: DISCONTINUED | OUTPATIENT
Start: 2017-10-08 | End: 2017-10-10 | Stop reason: HOSPADM

## 2017-10-08 RX ORDER — CITALOPRAM 20 MG/1
10 TABLET, FILM COATED ORAL DAILY
Status: DISCONTINUED | OUTPATIENT
Start: 2017-10-08 | End: 2017-10-09

## 2017-10-08 RX ORDER — MAG HYDROX/ALUMINUM HYD/SIMETH 200-200-20
30 SUSPENSION, ORAL (FINAL DOSE FORM) ORAL
Status: DISCONTINUED | OUTPATIENT
Start: 2017-10-08 | End: 2017-10-10 | Stop reason: HOSPADM

## 2017-10-08 RX ORDER — DEXTROSE, SODIUM CHLORIDE, AND POTASSIUM CHLORIDE 5; .45; .15 G/100ML; G/100ML; G/100ML
50 INJECTION INTRAVENOUS CONTINUOUS
Status: DISCONTINUED | OUTPATIENT
Start: 2017-10-08 | End: 2017-10-10 | Stop reason: HOSPADM

## 2017-10-08 RX ORDER — ACETAMINOPHEN 325 MG/1
650 TABLET ORAL
Status: DISCONTINUED | OUTPATIENT
Start: 2017-10-08 | End: 2017-10-10 | Stop reason: HOSPADM

## 2017-10-08 RX ADMIN — PANTOPRAZOLE SODIUM 40 MG: 40 TABLET, DELAYED RELEASE ORAL at 17:46

## 2017-10-08 RX ADMIN — ONDANSETRON 4 MG: 2 INJECTION INTRAMUSCULAR; INTRAVENOUS at 02:08

## 2017-10-08 RX ADMIN — Medication 5 ML: at 05:54

## 2017-10-08 RX ADMIN — DEXTROSE MONOHYDRATE, SODIUM CHLORIDE, AND POTASSIUM CHLORIDE 100 ML/HR: 50; 4.5; 1.49 INJECTION, SOLUTION INTRAVENOUS at 12:29

## 2017-10-08 RX ADMIN — ALUMINUM HYDROXIDE, MAGNESIUM HYDROXIDE, AND SIMETHICONE 30 ML: 200; 200; 20 SUSPENSION ORAL at 21:07

## 2017-10-08 RX ADMIN — ENOXAPARIN SODIUM 40 MG: 40 INJECTION SUBCUTANEOUS at 21:05

## 2017-10-08 RX ADMIN — METRONIDAZOLE 500 MG: 500 INJECTION, SOLUTION INTRAVENOUS at 08:48

## 2017-10-08 RX ADMIN — ACETAMINOPHEN 650 MG: 325 TABLET ORAL at 13:11

## 2017-10-08 RX ADMIN — Medication 5 ML: at 17:48

## 2017-10-08 RX ADMIN — ACETAMINOPHEN 650 MG: 650 SUPPOSITORY RECTAL at 01:03

## 2017-10-08 RX ADMIN — HYDROMORPHONE HYDROCHLORIDE: 2 INJECTION INTRAMUSCULAR; INTRAVENOUS; SUBCUTANEOUS at 11:18

## 2017-10-08 RX ADMIN — Medication 600 UNITS: at 05:54

## 2017-10-08 RX ADMIN — ONDANSETRON 4 MG: 2 INJECTION INTRAMUSCULAR; INTRAVENOUS at 09:26

## 2017-10-08 RX ADMIN — CIPROFLOXACIN 400 MG: 2 INJECTION, SOLUTION INTRAVENOUS at 10:01

## 2017-10-08 RX ADMIN — CIPROFLOXACIN 400 MG: 2 INJECTION, SOLUTION INTRAVENOUS at 21:05

## 2017-10-08 RX ADMIN — METRONIDAZOLE 500 MG: 500 INJECTION, SOLUTION INTRAVENOUS at 21:06

## 2017-10-08 RX ADMIN — FLUCONAZOLE 400 MG: 2 INJECTION INTRAVENOUS at 21:06

## 2017-10-08 RX ADMIN — CITALOPRAM HYDROBROMIDE 10 MG: 20 TABLET ORAL at 14:00

## 2017-10-08 RX ADMIN — PANTOPRAZOLE SODIUM 40 MG: 40 TABLET, DELAYED RELEASE ORAL at 21:07

## 2017-10-08 RX ADMIN — ENOXAPARIN SODIUM 40 MG: 40 INJECTION SUBCUTANEOUS at 08:47

## 2017-10-08 RX ADMIN — Medication 5 ML: at 11:24

## 2017-10-08 RX ADMIN — ONDANSETRON 4 MG: 2 INJECTION INTRAMUSCULAR; INTRAVENOUS at 14:00

## 2017-10-08 NOTE — PROGRESS NOTES
Abdominal dressing/ RLQ STELLA drain dressing with drainage visible. Dressings removed. Redness observed at midline abdominal dressing and around STELLA site. Gauze applied to sites and secured with tape.

## 2017-10-08 NOTE — PROGRESS NOTES
H&P/Consult Note/Progress Note/Office Note:   Enio Kent  MRN: [id number]  U:40/73/6788  Age:76 y.o.       HPI: Chemo Childress T 74 y. o. female who is s/p right hemicolectomy for a RLQ phlegmon involving bowel and benign inflammatory cecal mass with drainage of localized RLQ pseudomonas abscess. She also had biopsy of a left retroperitoneal fatty tumor noted in pre-op CT. This was done after about 2 weeks of bowel rest IV Abx, and TPN failed to improve the RLQ phlegmon on serial CT imaging     Path was benign  DIAGNOSIS   A: Allie Rummage: FIBROSIS WITH CHRONIC INFLAMMATION. B: RIGHT COLON WITH CECAL MASS: SEROSAL CHRONIC INFLAMMATION OF THE APPENDIX AND MILD ACUTE SEROSITIS INVOLVING THE CECUM; FOUR BENIGN, REACTIVE LYMPH NODES. C: RETROPERITONEAL MASS: BENIGN FIBROADIPOSE TISSUE. Electronically signed out on 10/5/2017 12:48 by Kd Hansen MD   Procedures/Addenda   Microscopic Description   A: The specimen consists of a focus of fibrosis involving fibroadipose tissue. Mild chronic inflammation is seen in this area. No atypia is seen. B: Sections of the appendix show chronic serosal inflammation and mild fibrosis. The cecum has areas of acute serositis. No other significant histologic abnormalities identified in sections of the colon. The lymph nodes appear reactive with follicular hyperplasia. No significant atypia seen. C: The specimen consists of benign fibroadipose tissue. No atypia is seen. Dr. Wendy Foster concurs.      Prior to surgery she originally presented to and was admitted form the ER with a 1 month h/o severe (8/10) RLQ pain.    This was progressively worsening and associated with intermittent N/V. Nothing made it better or worse. She was treated previously with laxatives without improvement. [facility] about 5 days prior to admission and was treated with antibiotics for UTI.  No imaging was done.   She came back to the ER on the day of admission and CT scan was obtained as shown below. She is s/p lap francois at [facility] in approx 2012.    She is s/p hysterectomy about 40 yrs ago for benign fibroids. Derickkim Escalante thinks her ovaries remain. She denies associated bloody BMs or diarrhea.            Results for Madelaine Bobby (MRN [id number]) as of 9/19/2017 09:35     Ref. Range 9/18/2017 16:42   CEA Latest Ref Range: 0.0 - 3.0 ng/mL 0.8   CA-125 Latest Ref Range: 1.5 - 35.0 U/mL 5           9/18/17 CT abd/pelvis with contrast with IV but not oral contrast   Minimal linear atelectasis or scarring is present in the lung bases, left greater than right. There is a small to moderate-sized hiatal hernia. No pleural or pericardial fluid.      Small hypoattenuating lesions are present in the right hepatic lobe and left kidney, too small to definitively characterize but statistically likely  representing cysts. There is central biliary ductal ectasia s/p cholecystectomy.      The remaining solid abdominal viscera are unremarkable to the limits of  noncontrast CT. Atherosclerotic abdominal aorta and branch vessels. No upper  abdominal no retroperitoneal lymph node enlargement. Nonopacified small bowel  loops are normal in caliber.      Pelvis: There is somewhat prominent lipomatosis along the left posterior  pararenal fascia and iliopsoas without associated soft tissue component,  measuring up to 12.0 x 23 x 9 cm. The bladder is predominantly decompressed. Uterus not visualized.      Extensive diverticular changes are present in the colon which is otherwise  normal in caliber. There is a large ill-defined inflammatory process in the  right lower quadrant measuring 5.9 x 10.0 x 6.4 cm approximating the ileocecal  junction from which the appendix is not visualized as a distinctly separate  structure. Multiple high attenuation foci are present within or adjacent to the  bowel, suspicious for medication and/or enteroliths.  There is moderate adjacent  inflammatory fat stranding and multiple prominent right lower quadrant  mesenteric lymph nodes, the largest of which measures 1.2 cm in short axis.      Trace dependent free pelvic fluid. No aggressive osseous lesions.      IMPRESSION:  Nonspecific inflammatory process in RLQ at the ileocecal junction. Diff Dx include infectious, and ischemic colitis, appendicitis, inflammatory bowel disease, omental infarct.       Large extraperitoneal predominantly fatty attenuation mass without definite enhancing soft tissue. Size places the patient at risk for sarcoma, the inferior extent of which is not included in the field of view.         9/25/17 CT abd/pelvis with oral and IV contrast   Tiny hypodense lesion near the dome of the liver and another seen within the right posterior lobe. These are too small to further characterize. Otherwise, the liver and spleen enhances homogeneously without discrete lesions. There is no biliary ductal dilatation. Clips are present from prior cholecystectomy. The pancreas and adrenal glands are normal. The kidneys  enhance symmetrically. Bowel loops in the upper abdomen are normal. No definite upper abdominal lymphadenopathy seen.      The RLQ phlegmon seen previously is again noted with significant bowel wall thickening involving the cecum and distal ileum. There is adjacent fat stranding. The large extraperitoneal fatty mass discussed previously is again noted and is unchanged. The bladder and rectum are normal.  No pelvic adenopathy seen. No free air or free fluid seen within the pelvis.       IMPRESSION: No significant interval change when compared with the prior study.     10/2/17 CT abd/pelvis with oral and IV contrast  There is scarring within the lingula with mild bibasilar atelectasis. There is a hiatal hernia present.      CT abdomen: The liver and spleen enhances homogeneously without discrete lesions. There is no biliary ductal dilatation. Clips are present from prior cholecystectomy.  The pancreas and adrenal glands are normal. The kidneys enhance  symmetrically. Bowel loops in the upper abdomen are normal. No definite upper abdominal lymphadenopathy seen.      CT pelvis: No change in the appearance of the large fat density mass within the retroperitoneum on the left. In addition, the right lower quadrant phlegmon with bowel wall thickening is again seen and is unchanged. There is extensive  diverticulosis. The bladder and rectum are normal. No pelvic adenopathy seen. No free air or free fluid seen within the pelvis. Bone window evaluation demonstrates no aggressive osseous lesions.      IMPRESSION: No significant interval change when compared with the prior study.             9/19/17 Abd pain unchanged; Mass-effect in RLQ; PICC requested; on IV ABx; NPO  9/20/17 PICC placed yesterday; on TPN; Nausea persists;  RLQ pain no better since admission and still 8/10  Requests adult diapers secondary to urinary incontinence  9/21/17 says her pain is 8/10 but looks comfortable  9/22/17 pain issues improving  9/23/17 Awake in bed. No new complaints. 9/24/17 Resting in bed. No new complaints. Family at bedside.   9/25/17 Overall improvement in RLQ pain compared to admission  9/26/17 reports RLQ abd pain better since admission  9/27/17 no changes  9/28/17 she feels like RX starting to work with less RLQ pain  9/29/17 slow but steady improvement clinically; still with RLQ pain  9/30/17 RLQ pain better compared to admission  10/1/17 she reports pain 4/10 for only about 5hrs total during the day compared to 8/10 pain 24hrs a day at admission  10/2/17 worsening  Pain now is suprapubic region and LLQ; follow-up CT pending  10/3/17 CT images reviewed, decided to proceed with laparotomy--->OR     10/4/17 POD1; doing OK; correct hypophosphatemia; resume TPN no complaints  10/5/17 POD2 WBC normal; she pulled out her NGT  10/6/17 POD3 fever; wound OK; OOB today; encouraging inc fidelina  10/7/17 POD4 fevers; bld cultures pending; Torres out; she wants rehab bed  10/8/17 POD5 fevers, Culture showing Gram+ Cocci in blood; +yeast PICC Line.  +Large BM          Past Medical History:   Diagnosis Date    Anxiety state, unspecified     GERD (gastroesophageal reflux disease)     Hypercholesterolemia      Past Surgical History:   Procedure Laterality Date    HX CHOLECYSTECTOMY      HX COLONOSCOPY      Dr Lizet Velez 2012, repeat 5 years    HX GYN      hysterectomy, partial    HX KNEE REPLACEMENT      right     Current Facility-Administered Medications   Medication Dose Route Frequency    dextrose 5% - 0.45% NaCl with KCl 20 mEq/L infusion  100 mL/hr IntraVENous CONTINUOUS    acetaminophen (TYLENOL) tablet 650 mg  650 mg Oral Q6H PRN    citalopram (CELEXA) tablet 10 mg  10 mg Oral DAILY    magic mouthwash (DARÍO) oral suspension 5 mL  5 mL Oral Q6H    fluconazole (DIFLUCAN) 400mg/200 mL IVPB (premix)  400 mg IntraVENous Q24H    ciprofloxacin (CIPRO) 400 mg IVPB (premix)  400 mg IntraVENous Q12H    metroNIDAZOLE (FLAGYL) IVPB premix 500 mg  500 mg IntraVENous Q12H    acetaminophen (TYLENOL) suppository 650 mg  650 mg Rectal Q4H PRN    diphenhydrAMINE (BENADRYL) injection 25 mg  25 mg IntraVENous Q8H PRN    HYDROmorphone (DILAUDID) 20 mg / 20 mL PCA   IntraVENous CONTINUOUS    enoxaparin (LOVENOX) injection 40 mg  40 mg SubCUTAneous Q12H    ondansetron (ZOFRAN) injection 4 mg  4 mg IntraVENous Q4H PRN    promethazine (PHENERGAN) with saline injection 12.5 mg  12.5 mg IntraVENous Q4H PRN    NUTRITIONAL SUPPORT ELECTROLYTE PRN ORDERS   Does Not Apply PRN     Morphine  Social History     Social History    Marital status:      Spouse name: N/A    Number of children: N/A    Years of education: N/A     Social History Main Topics    Smoking status: Former Smoker     Quit date: 1/1/1975    Smokeless tobacco: Never Used    Alcohol use No    Drug use: No    Sexual activity: Not Asked     Other Topics Concern    None Social History Narrative     History   Smoking Status    Former Smoker    Quit date: 1/1/1975   Smokeless Tobacco    Never Used     Family History   Problem Relation Age of Onset    Diabetes Mother     Heart Disease Mother     Thyroid Disease Mother     Heart Disease Father      ROS: Comprehensive review of systems was otherwise unremarkable except as noted above. Physical Exam:   Visit Vitals    /63    Pulse 95    Temp (!) 102.3 °F (39.1 °C)  Comment: nurse notified    Resp 18    Ht 5' 4\" (1.626 m)    Wt 248 lb 1.6 oz (112.5 kg)    SpO2 94%    BMI 42.59 kg/m2     Constitutional: Alert, oriented, cooperative patient in no acute distress; appears stated age    Eyes:Sclera are clear. EOMs intact  ENMT: no external lesions gross hearing normal; no obvious neck masses, no ear or lip lesions, nares normal  CV: RRR. Normal perfusion  Resp: No JVD.  Breathing is  non-labored; no audible wheezing.    GI: obese; Midline incision without cellulitis;  RLQ and pelvic STELLA drain is serosang - 60cc out last 24 hours, BS active  Musculoskeletal: unremarkable with normal function. No embolic signs or cyanosis.    Neuro:  Oriented; moves all 4; no focal deficits  Psychiatric: normal affect and mood, no memory impairment    Recent vitals (if inpt):  Patient Vitals for the past 24 hrs:   BP Temp Pulse Resp SpO2 Weight   10/08/17 1128 118/63 (!) 102.3 °F (39.1 °C) 95 18 94 % -   10/08/17 0727 - - - - 92 % -   10/08/17 0713 128/57 99.8 °F (37.7 °C) 98 16 92 % -   10/08/17 0501 - - - - - 248 lb 1.6 oz (112.5 kg)   10/08/17 0327 154/81 (!) 100.5 °F (38.1 °C) (!) 129 16 93 % -   10/08/17 0016 154/82 (!) 102.1 °F (38.9 °C) (!) 136 16 92 % -   10/07/17 1900 90/53 100.1 °F (37.8 °C) 92 16 92 % -   10/07/17 1530 97/60 99.1 °F (37.3 °C) (!) 123 17 - -   10/07/17 1441 - 99.9 °F (37.7 °C) - - - -       Labs:  Recent Labs      10/08/17   0551  10/07/17   0620   WBC  3.9*  3.7*   HGB  8.4*  9.2*   PLT  191  217   NA  137 140   K  3.8  3.8   CL  103  105   CO2  26  28   BUN  13  13   CREA  0.52*  0.48*   GLU  126*  114*   TBILI   --   0.3   SGOT   --   20   ALT   --   26   AP   --   92       Lab Results   Component Value Date/Time    WBC 3.9 10/08/2017 05:51 AM    HGB 8.4 10/08/2017 05:51 AM    PLATELET 206 42/98/6714 05:51 AM    Sodium 137 10/08/2017 05:51 AM    Potassium 3.8 10/08/2017 05:51 AM    Chloride 103 10/08/2017 05:51 AM    CO2 26 10/08/2017 05:51 AM    BUN 13 10/08/2017 05:51 AM    Creatinine 0.52 10/08/2017 05:51 AM    Glucose 126 10/08/2017 05:51 AM    Bilirubin, total 0.3 10/07/2017 06:20 AM    AST (SGOT) 20 10/07/2017 06:20 AM    ALT (SGPT) 26 10/07/2017 06:20 AM    Alk. phosphatase 92 10/07/2017 06:20 AM       I reviewed recent labs and recent radiologic studies. I independently reviewed radiology images for studies I described above or studies I have ordered. Admission date (for inpatients): 9/18/2017   5 Days Post-Op  Procedure(s):  EXPLORATORY LAPAROTOMY, EXCISION OF PERITONEAL NODULE, RIGHT COLECTOMY.     ASSESSMENT/PLAN:  Problem List  Date Reviewed: 10/3/2017          Codes Class Noted    RLQ abdominal phlegmon ICD-10-CM: L02.91  ICD-9-CM: 682.9  10/4/2017        Localized peritonitis ([facility] [address] ICD-10-CM: K65.9  ICD-9-CM: 567.9  10/4/2017        Mass of cecum ICD-10-CM: K63.9  ICD-9-CM: 569.9  10/4/2017        Massive Left Retroperitoneal fatty mass seen on CT scan extending from kidney to groin ICD-10-CM: R19.00  ICD-9-CM: 789.30  9/22/2017        Acute cystitis ICD-10-CM: N30.00  ICD-9-CM: 595.0  9/19/2017        RLQ abdominal mass ICD-10-CM: R19.03  ICD-9-CM: 789.33  9/19/2017        RLQ abdominal pain ICD-10-CM: R10.31  ICD-9-CM: 789.03  9/19/2017        Leukocytosis ICD-10-CM: D72.829  ICD-9-CM: 288.60  9/19/2017        Non-intractable vomiting with nausea ICD-10-CM: R11.2  ICD-9-CM: 787.01  9/19/2017        * (Principal)Intra-abdominal infection ICD-10-CM: B99.9  ICD-9-CM: 136.9  9/18/2017        Anxiety state, unspecified ICD-10-CM: F41.1  ICD-9-CM: 300.00  Unknown        GERD (gastroesophageal reflux disease) ICD-10-CM: K21.9  ICD-9-CM: 530.81  Unknown        Hypercholesterolemia ICD-10-CM: E78.00  ICD-9-CM: 272.0  Unknown            Principal Problem:    Intra-abdominal infection (9/18/2017)    Active Problems:    Acute cystitis (9/19/2017)      RLQ abdominal mass (9/19/2017)      RLQ abdominal pain (9/19/2017)      Leukocytosis (9/19/2017)      Non-intractable vomiting with nausea (9/19/2017)      Massive Left Retroperitoneal fatty mass seen on CT scan extending from kidney to groin (9/22/2017)      RLQ abdominal phlegmon (10/4/2017)      Localized peritonitis ([address] (10/4/2017)      Mass of cecum (10/4/2017)       Plan:  POD5 right colectomy  Fevers daily-->peritoneal cultures growing pseudomonas (intermediate sen to zosyn but sen to quinolones)  Changed IV zosyn to IV cipro/flagyl on 10/6/17  Blood cultures - Gram+ Cocci in blood; +yeast PICC Line    PICC Line removed 10/8/17   TPN DC - Start Clears 10/8/17  IVF  Torres out 10/71/7  OOB qday  Empty STELLA PRN  Inc fidelina      GI (pepcid- add to TPN) and VTE (lovenox) prophylaxis ordered     Consult case manage for SNF/rehab bed        Signed:  Harley Sauceda, [facility]    The patient was seen in conjunction with Dr. Khurram Frederick who independently evaluated the patient, reviewed the chart and agreed with the assessment and plan.

## 2017-10-08 NOTE — PROGRESS NOTES
Positive aerobic blood cultures growing yeast in picc line (red line). Call placed to on call surgeon.

## 2017-10-08 NOTE — PROGRESS NOTES
END OF SHIFT NOTE:    INTAKE/OUTPUT  10/07 0701 - 10/08 0700  In: 1688.3 [I.V.:1688.3]  Out: 1414 [XSAYU:8330; Drains:60]  Voiding: YES  Catheter: NO  Drain:   Jimbo-Castañeda Drain 10/03/17 Right; Lower Abdomen (Active)   Site Assessment Clean, dry, & intact 10/8/2017  7:45 AM   Dressing Status Clean, dry, & intact 10/8/2017  7:45 AM   Status Patent; Charged;Draining 10/8/2017  7:45 AM   Drainage Color Serosanguinous 10/8/2017  7:45 AM   Output (ml) 10 ml 10/8/2017  7:14 PM               Flatus: Patient does have flatus present. Stool:  2 occurrences. Characteristics:  Stool Assessment  Stool Color: Brown  Stool Appearance: Loose  Stool Amount: Large  Stool Source/Status: Rectum    Emesis: 0 occurrences. Characteristics:        VITAL SIGNS  Patient Vitals for the past 12 hrs:   Temp Pulse Resp BP SpO2   10/08/17 1913 98.9 °F (37.2 °C) (!) 118 18 109/73 96 %   10/08/17 1521 98.8 °F (37.1 °C) (!) 115 18 111/72 94 %   10/08/17 1128 (!) 102.3 °F (39.1 °C) 95 18 118/63 94 %       Pain Assessment  Pain Intensity 1: 0 (10/08/17 0848)  Pain Location 1: Abdomen  Pain Intervention(s) 1: Encouraged PCA  Patient Stated Pain Goal: 0    Ambulating  Yes    Shift report given to oncoming nurse at the bedside.     Charity Herzog RN

## 2017-10-08 NOTE — PROGRESS NOTES
Administered tylenol suppository related to increased temperature. Encouraged patient to use incentive spirometer to aid in decreasing temperature and pneumonia. Showed grand daughter how to use spirometer and to encourage patient to use spirometer.

## 2017-10-08 NOTE — PROGRESS NOTES
Problem: Nutrition Deficit  Goal: *Optimize nutritional status  Nutrition F/U   TPN management (Dr. Lidia Villalta)   Assessment:   Diet order(s): 9-18: NPO  Central line access: Positive aerobic blood cultures growing yeast in picc line  TPN has been stopped at this time due to the above problem. Patient is s/p excision of peritoneal node, right colectomy and drainage of peritonitis (10-4). Pertinent Labs: unremarkable today  Anthropometrics:Height: 5' 4\",  Weight: 112.5 kg, Weight Source: Bed. No noted edema  Macronutrient needs:  EER: 1130-1129 kcal /day (13-18 kcal/kg actual BW)   EPR: 65-82 grams protein/day (1.2-1.5 grams/kg IBW). Dr Jax Moreno standard request is 2 gm/kg KYS=677 g PRO/d)   Max CHO: 314 grams/day (4mg/kg IBW/min)   Fluid: 1ml/kcal   Intake/Comparative Standards: Current NPO status with no TPN infusing meets 0% of estimated needs. Intervention:   Meals and snacks: NPO. Await diet initiation and progression. Nutritional Supplement Therapy: Electrolyte replacement per nutritional support protocols are active on MAR. TPN: discontinued secondary to infected PICC. Nutrition Discharge Plan: Too soon to be determined. Veleria Peat Paula [place], [address], [address] [place] [address],  [phone]

## 2017-10-08 NOTE — PROGRESS NOTES
END OF SHIFT NOTE:    INTAKE/OUTPUT  10/07 0701 - 10/08 0700  In: 1688.3 [I.V.:1688.3]  Out: 1160 [VOVDD:0593; Drains:60]  Voiding: YES  Catheter: NO  Drain:   Jimbo-Castañeda Drain 10/03/17 Right; Lower Abdomen (Active)   Site Assessment Clean, dry, & intact 10/7/2017  7:23 PM   Dressing Status Clean, dry, & intact 10/7/2017  7:23 PM   Status Patent; Charged;Draining 10/7/2017  7:23 PM   Drainage Color Serosanguinous 10/7/2017  7:23 PM   Output (ml) 20 ml 10/8/2017  3:28 AM               Flatus: Patient does not have flatus present. Stool:  1 occurrences. Characteristics:  Stool Assessment  Stool Color: Tan (Comment)  Stool Appearance: Loose  Stool Amount: Medium  Stool Source/Status: Rectum    Emesis: 0 occurrences. Characteristics:        VITAL SIGNS  Patient Vitals for the past 12 hrs:   Temp Pulse Resp BP SpO2   10/08/17 0327 (!) 100.5 °F (38.1 °C) (!) 129 16 154/81 93 %   10/08/17 0016 (!) 102.1 °F (38.9 °C) (!) 136 16 154/82 92 %   10/07/17 1900 100.1 °F (37.8 °C) 92 16 90/53 92 %       Pain Assessment  Pain Intensity 1: 0 (10/08/17 0355)  Pain Location 1: Abdomen  Pain Intervention(s) 1: Encouraged PCA  Patient Stated Pain Goal: 0    Ambulating  No    Shift report given to oncoming nurse at the bedside.     Annette Padron RN

## 2017-10-09 LAB
ANION GAP SERPL CALC-SCNC: 7 MMOL/L (ref 7–16)
BACTERIA SPEC ANAEROBE CULT: NORMAL
BACTERIA SPEC CULT: ABNORMAL
BACTERIA SPEC CULT: ABNORMAL
BUN SERPL-MCNC: 9 MG/DL (ref 8–23)
CALCIUM SERPL-MCNC: 7.8 MG/DL (ref 8.3–10.4)
CHLORIDE SERPL-SCNC: 102 MMOL/L (ref 98–107)
CO2 SERPL-SCNC: 27 MMOL/L (ref 21–32)
CREAT SERPL-MCNC: 0.46 MG/DL (ref 0.6–1)
GLUCOSE SERPL-MCNC: 95 MG/DL (ref 65–100)
MAGNESIUM SERPL-MCNC: 1.8 MG/DL (ref 1.8–2.4)
PHOSPHATE SERPL-MCNC: 2.3 MG/DL (ref 2.3–3.7)
POTASSIUM SERPL-SCNC: 3.9 MMOL/L (ref 3.5–5.1)
SODIUM SERPL-SCNC: 136 MMOL/L (ref 136–145)
SOURCE, RSRC56: ABNORMAL
SPECIMEN SOURCE: NORMAL

## 2017-10-09 PROCEDURE — 80048 BASIC METABOLIC PNL TOTAL CA: CPT | Performed by: SURGERY

## 2017-10-09 PROCEDURE — 65270000029 HC RM PRIVATE

## 2017-10-09 PROCEDURE — 83735 ASSAY OF MAGNESIUM: CPT | Performed by: SURGERY

## 2017-10-09 PROCEDURE — 74011250637 HC RX REV CODE- 250/637: Performed by: SURGERY

## 2017-10-09 PROCEDURE — 74011250637 HC RX REV CODE- 250/637: Performed by: NURSE PRACTITIONER

## 2017-10-09 PROCEDURE — 36415 COLL VENOUS BLD VENIPUNCTURE: CPT | Performed by: SURGERY

## 2017-10-09 PROCEDURE — 84100 ASSAY OF PHOSPHORUS: CPT | Performed by: SURGERY

## 2017-10-09 PROCEDURE — 74011250636 HC RX REV CODE- 250/636: Performed by: SURGERY

## 2017-10-09 RX ORDER — HYDROMORPHONE HYDROCHLORIDE 1 MG/ML
0.4 INJECTION, SOLUTION INTRAMUSCULAR; INTRAVENOUS; SUBCUTANEOUS
Status: DISCONTINUED | OUTPATIENT
Start: 2017-10-09 | End: 2017-10-10 | Stop reason: HOSPADM

## 2017-10-09 RX ORDER — VANCOMYCIN 1.75 GRAM/500 ML IN 0.9 % SODIUM CHLORIDE INTRAVENOUS
1750 EVERY 24 HOURS
Status: DISCONTINUED | OUTPATIENT
Start: 2017-10-09 | End: 2017-10-10 | Stop reason: HOSPADM

## 2017-10-09 RX ORDER — OXYCODONE HYDROCHLORIDE 5 MG/1
5-10 TABLET ORAL
Status: DISCONTINUED | OUTPATIENT
Start: 2017-10-09 | End: 2017-10-09 | Stop reason: SDUPTHER

## 2017-10-09 RX ORDER — HYDROMORPHONE HYDROCHLORIDE 1 MG/ML
0.2 INJECTION, SOLUTION INTRAMUSCULAR; INTRAVENOUS; SUBCUTANEOUS
Status: DISCONTINUED | OUTPATIENT
Start: 2017-10-09 | End: 2017-10-10 | Stop reason: HOSPADM

## 2017-10-09 RX ORDER — OXYCODONE HYDROCHLORIDE 5 MG/1
5 TABLET ORAL
Status: DISCONTINUED | OUTPATIENT
Start: 2017-10-09 | End: 2017-10-10 | Stop reason: HOSPADM

## 2017-10-09 RX ORDER — OXYCODONE HYDROCHLORIDE 5 MG/1
10 TABLET ORAL
Status: DISCONTINUED | OUTPATIENT
Start: 2017-10-09 | End: 2017-10-10 | Stop reason: HOSPADM

## 2017-10-09 RX ORDER — CIPROFLOXACIN 500 MG/1
500 TABLET ORAL EVERY 8 HOURS
Status: DISCONTINUED | OUTPATIENT
Start: 2017-10-09 | End: 2017-10-10 | Stop reason: HOSPADM

## 2017-10-09 RX ADMIN — HYDROMORPHONE HYDROCHLORIDE 0.4 MG: 1 INJECTION, SOLUTION INTRAMUSCULAR; INTRAVENOUS; SUBCUTANEOUS at 14:37

## 2017-10-09 RX ADMIN — ENOXAPARIN SODIUM 40 MG: 40 INJECTION SUBCUTANEOUS at 08:31

## 2017-10-09 RX ADMIN — ENOXAPARIN SODIUM 40 MG: 40 INJECTION SUBCUTANEOUS at 19:39

## 2017-10-09 RX ADMIN — FLUCONAZOLE 400 MG: 2 INJECTION INTRAVENOUS at 21:38

## 2017-10-09 RX ADMIN — CIPROFLOXACIN HYDROCHLORIDE 500 MG: 500 TABLET, FILM COATED ORAL at 08:31

## 2017-10-09 RX ADMIN — PANTOPRAZOLE SODIUM 40 MG: 40 TABLET, DELAYED RELEASE ORAL at 21:37

## 2017-10-09 RX ADMIN — DEXTROSE MONOHYDRATE, SODIUM CHLORIDE, AND POTASSIUM CHLORIDE 50 ML/HR: 50; 4.5; 1.49 INJECTION, SOLUTION INTRAVENOUS at 14:39

## 2017-10-09 RX ADMIN — VANCOMYCIN HYDROCHLORIDE 1750 MG: 10 INJECTION, POWDER, LYOPHILIZED, FOR SOLUTION INTRAVENOUS at 08:33

## 2017-10-09 RX ADMIN — CIPROFLOXACIN HYDROCHLORIDE 500 MG: 500 TABLET, FILM COATED ORAL at 14:38

## 2017-10-09 RX ADMIN — CIPROFLOXACIN HYDROCHLORIDE 500 MG: 500 TABLET, FILM COATED ORAL at 21:38

## 2017-10-09 RX ADMIN — Medication 5 ML: at 05:30

## 2017-10-09 RX ADMIN — PANTOPRAZOLE SODIUM 40 MG: 40 TABLET, DELAYED RELEASE ORAL at 08:41

## 2017-10-09 RX ADMIN — HYDROMORPHONE HYDROCHLORIDE 0.4 MG: 1 INJECTION, SOLUTION INTRAMUSCULAR; INTRAVENOUS; SUBCUTANEOUS at 09:00

## 2017-10-09 RX ADMIN — Medication 5 ML: at 01:07

## 2017-10-09 NOTE — PROGRESS NOTES
Pharmacokinetic Consult to Pharmacist    Emily Payne is a 68 y.o. female being treated for possible gram positive cocci bacteremia (in 1/2 BCx) with vancomycin. Pt also with yeast in PICC line and quinolone-sensitive pseudomonas growth in peritoneal fluid on diflucan and oral ciprofloxacin. Height: 5' 4\" (162.6 cm)  Weight: 110.9 kg (244 lb 9.6 oz)  Lab Results   Component Value Date/Time    BUN 13 10/08/2017 05:51 AM    Creatinine 0.52 10/08/2017 05:51 AM    WBC 3.9 10/08/2017 05:51 AM    Lactic acid 0.7 10/06/2017 09:04 PM      Estimated Creatinine Clearance: 112.2 mL/min (based on Cr of 0.52).     All Micro Results     Procedure Component Value Units Date/Time    CULTURE, BLOOD [[id number]] Collected:  10/06/17 2104    Order Status:  Completed Specimen:  Blood from Blood Updated:  10/08/17 0747     Special Requests: RED PICC        GRAM STAIN YEAST         AEROBIC BOTTLE POSITIVE                 RESULTS VERIFIED, PHONED TO AND READ BACK BY Diana Leon RN @ 1943 ON [id number] BY EUGENIO              GRAM POS COCCI IN CLUSTERS YEAST      ANAEROBIC BOTTLE POSITIVE                 RESULTS VERIFIED, PHONED TO AND READ BACK BY Bladimir Chung RN AT 1647 ON 10/8/17 SG     Culture result:         CULTURE IN [address] UPDATES TO FOLLOW    CULTURE, BLOOD [[id number]] Collected:  10/06/17 2057    Order Status:  Completed Specimen:  Blood from Blood Updated:  10/08/17 0648     Special Requests: LEFT        Culture result: NO GROWTH 2 DAYS       CULTURE, BODY FLUID Filomena Vinton STAIN [[id number]]  (Abnormal)  (Susceptibility) Collected:  10/03/17 1706    Order Status:  Completed Specimen:  Peritoneal Fluid Updated:  10/06/17 0920     Special Requests: SWAB R LOWER QUADRANT     GRAM STAIN 0 TO 2 WBCS/OIF      NO DEFINITE ORGANISM SEEN        Culture result:         MODERATE PSEUDOMONAS AERUGINOSA (A)    CULTURE, ANAEROBIC [[id number]] Collected:  10/03/17 1715    Order Status:  Canceled Specimen:  Surgical Specimen     CULTURE, STOOL [[id number]] Collected:  09/25/17 1845    Order Status:  Completed Specimen:  Stool Updated:  09/29/17 0715     Special Requests: NO SPECIAL REQUESTS        Culture result:         No Salmonella, Shigella, or Ecoli 0157 isolated. CULTURE, STOOL [[id number]] Collected:  09/19/17 0945    Order Status:  Canceled Specimen:  Stool     OVA & Cy Armor [[id number]] Collected:  09/19/17 0945    Order Status:  Canceled Specimen:  Stool from Stool           Day 1 of vancomycin. Goal trough is 15-20. Vancomycin dose initiated at 1.75 g q24h. Will continue to follow patient.       Thank you,  Kana Antoine, PharmD

## 2017-10-09 NOTE — DISCHARGE SUMMARY
[facility] [address]  [place], [address] [facility]  [phone]   Discharge Summary     Madan Frias  MRN: [id number]     : 1940     Age: 68 y.o. 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 Brief History and Reason for Admission: Madan Frias was admitted with the following history of present illness.  HPI: Madan Frias is a 68 y.o. female who reports a 1 month h/o RLQ pain.       This has been worsening and is associated with intermittent N/V. It is moderate in intensity  Nothing makes it better or worse. She was treated previously with laxatives without imp[rovement. She came the [facility] ER about 5 days ago and was treated with antibiotics for UTI. No imaging was done. She came back to the ER today and CT obtained as shown below. She is s/p lap robbi at [facility] . She is s/p hysterectomy about 40 yrs ago for benign fibroids. She thinks her ovaries remain. She denies bloody BMs or diarrhea.            17 CT abd/pelvis with contrast with IV but not oral contrast   Minimal linear atelectasis or scarring is present in the lung bases,  left greater than right. There is a small to moderate-sized hiatal hernia.  No  pleural or pericardial fluid.      Small hypoattenuating lesions are present in the right hepatic lobe and left  kidney, too small to definitively characterize but statistically likely  representing cysts. There is central biliary ductal ectasia status post  cholecystectomy.      The remaining solid abdominal viscera are unremarkable to the limits of  noncontrast CT. Atherosclerotic abdominal aorta and branch vessels. No upper  abdominal no retroperitoneal lymph node enlargement. Nonopacified small bowel  loops are normal in caliber.      Pelvis: There is somewhat prominent lipomatosis along the left posterior  pararenal fascia and iliopsoas without associated soft tissue component,  measuring up to 12.0 x 23 x 9 cm. The bladder is predominantly decompressed. Uterus not visualized.      Extensive diverticular changes are present in the colon which is otherwise  normal in caliber. There is a large ill-defined inflammatory process in the  right lower quadrant measuring 5.9 x 10.0 x 6.4 cm approximating the ileocecal  junction from which the appendix is not visualized as a distinctly separate  structure. Multiple high attenuation foci are present within or adjacent to the  bowel, suspicious for medication and/or enteroliths. There is moderate adjacent  inflammatory fat stranding and multiple prominent right lower quadrant  mesenteric lymph nodes, the largest of which measures 1.2 cm in short axis.      Trace dependent free pelvic fluid. No aggressive osseous lesions.      IMPRESSION:      1. Nonspecific inflammatory process in the right lower quadrant at the ileocecal  junction. Differential considerations include infectious, and ischemic colitis,  appendicitis, inflammatory bowel disease, omental infarct. Surgical consultation  recommended.      2. Large extraperitoneal predominantly fatty attenuation mass without definite  enhancing soft tissue.  Size places the patient at risk for sarcoma, the inferior  extent of which is not included in the field of view.          [facility] Course:    She was admitted and placed on TPN, bowel rest, and IV Abx. Follow-up CT imaging showed no change in the RLQ phlegmon (see below) so she was taken to surgery on 10/3/17.    10/2/17 CT abd/pelvis with oral and IV contrast  There is scarring within the lingula with mild bibasilar atelectasis. There is a hiatal hernia present.      CT abdomen: The liver and spleen enhances homogeneously without discrete lesions. There is no biliary ductal dilatation. Clips are present from prior cholecystectomy. The pancreas and adrenal glands are normal. The kidneys enhance  symmetrically. Bowel loops in the upper abdomen are normal. No definite upper abdominal lymphadenopathy seen.      CT pelvis: No change in the appearance of the large fat density mass within the retroperitoneum on the left. In addition, the right lower quadrant phlegmon with bowel wall thickening is again seen and is unchanged. There is extensive  diverticulosis. The bladder and rectum are normal. No pelvic adenopathy seen. No free air or free fluid seen within the pelvis. Bone window evaluation demonstrates no aggressive osseous lesions.      IMPRESSION: No significant interval change when compared with the prior study.      Daily changes noted below:  9/19/17 Abd pain unchanged; Mass-effect in RLQ; PICC requested; on IV ABx; NPO  9/20/17 PICC placed yesterday; on TPN; Nausea persists;  RLQ pain no better since admission and still 8/10  Requests adult diapers secondary to urinary incontinence  9/21/17 says her pain is 8/10 but looks comfortable  9/22/17 pain issues improving  9/23/17 Awake in bed. No new complaints. 9/24/17 Resting in bed. No new complaints. Family at bedside.   9/25/17 Overall improvement in RLQ pain compared to admission  9/26/17 reports RLQ abd pain better since admission  9/27/17 no changes  9/28/17 she feels like RX starting to work with less RLQ pain  9/29/17 slow but steady improvement clinically; still with RLQ pain  9/30/17 RLQ pain better compared to admission  10/1/17 she reports pain 4/10 for only about 5hrs total during the day compared to 8/10 pain 24hrs a day at admission  10/2/17 worsening  Pain now is suprapubic region and LLQ; follow-up CT pending  10/3/17 CT images reviewed, decided to proceed with laparotomy--->OR today    On 10/3/17 she was taken to tthe OR for right hemicolectomy for a RLQ phlegmon involving bowel and benign inflammatory cecal mass with drainage of localized RLQ pseudomonas abscess.  She also had biopsy of a left retroperitoneal fatty tumor noted in pre-op CT. This was done after about 2 weeks of bowel rest IV Abx, and TPN failed to improve the RLQ phlegmon on serial CT imaging      Path was benign  DIAGNOSIS   A: Allie Rummage: FIBROSIS WITH CHRONIC INFLAMMATION. B: RIGHT COLON WITH CECAL MASS: SEROSAL CHRONIC INFLAMMATION OF THE APPENDIX AND MILD ACUTE SEROSITIS INVOLVING THE CECUM; FOUR BENIGN, REACTIVE LYMPH NODES. C: RETROPERITONEAL MASS: BENIGN FIBROADIPOSE TISSUE. Electronically signed out on 10/5/2017 12:48 by Kd Hansen MD   Procedures/Addenda   Microscopic Description   A: The specimen consists of a focus of fibrosis involving fibroadipose tissue. Mild chronic inflammation is seen in this area. No atypia is seen. B: Sections of the appendix show chronic serosal inflammation and mild fibrosis. The cecum has areas of acute serositis. No other significant histologic abnormalities identified in sections of the colon. The lymph nodes appear reactive with follicular hyperplasia. No significant atypia seen. C: The specimen consists of benign fibroadipose tissue. No atypia is seen. Dr. Gomes Doctor   Results for Chandra Meghna (MRN [id number]) as of 9/19/2017 09:35     Ref.  Range 9/18/2017 16:42   CEA Latest Ref Range: 0.0 - 3.0 ng/mL 0.8   CA-125 Latest Ref Range: 1.5 - 35.0 U/mL 5      10/4/17 POD1; doing OK; correct hypophosphatemia; resume TPN no complaints  10/5/17 POD2 WBC normal; she pulled out her NGT  10/6/17 POD3 fever; wound OK; OOB today; encouraging inc fidelina  RLQ Peritoneal cultures grew pseudomonas (intermediate sen to zosyn but sen to quinolones)  She was changed from IV zosyn to IV cipro/flagyl on 10/6/17  10/7/17 POD4 fevers; bld cultures pending; Torres out; she wants rehab bed  10/8/17 POD5 fevers, Culture showing Gram+ Cocci in blood; +yeast PICC Line. +Large BM; IV Abx changed to IV Vancomycin, Diflucan, Cipro  10/9/17 POD6 PICC out yesterday; Tolerating clear liquid diet with bowel function; advance to full liquid diet; await placement  10/10/17 POD& Tolerating PO; Tx to SNF on Cipro/Diflucan; final gm + cocci in blood is still pending;  IV Vanc until discharge             Condition at Discharge: good    Discharge Medications:   Current Discharge Medication List      START taking these medications    Details   fluconazole (DIFLUCAN) 150 mg tablet Take 1 Tab by mouth daily for 7 days. FDA advises cautious prescribing of oral fluconazole in pregnancy. Qty: 7 Tab, Refills: 0      ciprofloxacin HCl (CIPRO) 500 mg tablet Take 1 Tab by mouth two (2) times a day. Qty: 20 Tab, Refills: 0         CONTINUE these medications which have CHANGED    Details   citalopram (CELEXA) 10 mg tablet Take 1 Tab by mouth daily. Qty: 90 Tab, Refills: 1    Comments: HOLD THIS DRUG WHILE ON CIPROFLOXACIN  Associated Diagnoses: Depression, unspecified depression type         CONTINUE these medications which have NOT CHANGED    Details   promethazine (PHENERGAN) 25 mg tablet Take 1 Tab by mouth every six (6) hours as needed for up to 15 doses. Qty: 12 Tab, Refills: 0      pravastatin (PRAVACHOL) 40 mg tablet Take 1 Tab by mouth nightly.   Qty: 90 Tab, Refills: 1    Associated Diagnoses: Hypercholesterolemia      omeprazole (PRILOSEC) 40 mg capsule 1 po q day  Qty: 90 Cap, Refills: 1    Associated Diagnoses: Gastroesophageal reflux disease without esophagitis      fluticasone (FLONASE) 50 mcg/actuation nasal spray 2 Sprays by Both Nostrils route daily. Qty: 1 Bottle, Refills: 5    Associated Diagnoses: Other seasonal allergic rhinitis         STOP taking these medications       cephALEXin (KEFLEX) 500 mg capsule Comments:   Reason for Stopping:                 Disposition/Discharge Instructions/Follow-up Care:      Empty the STELLA drain as needed. Replace the dry gauze around the drain exit site as needed. Cover the midline incision with dry gauze and tape daily  Keep incision and drain exit site dry to lower risk of infection  No heavy lifting (>5lbs) for 6 weeks to reduce risk of developing a hernia in the incisions.     Pain prescription (Percocet) on chart for patient to use as needed for pain  Antibiotic Prescriptions (Cipro and Flagyl) on chart for outpt use also  Follow-up with Dr Ana Avila in 8-10 days in the office at:  [address], [address]  (Call for an appt time-->[phone]-->option 2)    Diet: Full liquid diet 1 more day and then soft foods until follow-up      Signed:  Raheem Mckay MD, FACS   10/9/2017  2:33 PM

## 2017-10-09 NOTE — PROGRESS NOTES
Problem: Nutrition Deficit  Goal: *Optimize nutritional status  Nutrition F/U   TPN management (Dr. Stephy Harris)   Assessment:   Diet order(s): 10/3 NPO, CLQ 10/8, FLQ 10/9  Patient is s/p excision of peritoneal node, right colectomy and drainage of peritonitis (10-4). TPN was discontinued over the weekend d/t infected PICC. Per patient, tolerated a CLQ dinner last night and breakfast this morning. She reports that she is ready for something of substance on her stomach. She does have complaints of mouth sores? Plan for discharge to SNF, potentially tomorrow. Anthropometrics:Height: 5' 4\",  Weight: 110.9 kg, Weight Source: Bed  (2nd floor, 10/9)  No noted edema  Macronutrient needs:  EER: 7650-2837 kcal /day (13-18 kcal/kg actual BW)   EPR: 65-82 grams protein/day (1.2-1.5 grams/kg IBW). Intake/Comparative Standards: CLQ diet is inadequate in kcal/protein. Intervention:   Meals and snacks: Advance as medically appropriate. Nutritional Supplement Therapy: Electrolyte replacement per nutritional support protocols are active on MAR. Commercial supplement: ensure enlive TID-flavor preference noted. Ordered FLQ lunch for patient. Nutrition Discharge Plan: Would benefit from continuation of ensure enlive TID or comparable formula at discharge.       S[address], [address] [place] [address], [address] [place] [address], [phone]

## 2017-10-09 NOTE — PROGRESS NOTES
END OF SHIFT NOTE:    INTAKE/OUTPUT  10/08 0701 - 10/09 0700  In: 6269 [I.V.:1190]  Out: 270 [Urine:250; Drains:20]  Voiding: YES  Catheter: NO  Drain:   Jimbo-Castañeda Drain 10/03/17 Right; Lower Abdomen (Active)   Site Assessment Clean, dry, & intact 10/8/2017  7:13 PM   Dressing Status Clean, dry, & intact 10/8/2017  7:13 PM   Status Patent; Charged;Draining 10/8/2017  7:13 PM   Drainage Color Serosanguinous 10/8/2017  7:13 PM   Output (ml) 0 ml 10/9/2017  3:14 AM               Flatus: Patient does have flatus present. Stool:  1 occurrences. Characteristics:  Stool Assessment  Stool Color: Brown  Stool Appearance: Loose  Stool Amount: Large  Stool Source/Status: Rectum    Emesis: 0 occurrences. Characteristics:        VITAL SIGNS  Patient Vitals for the past 12 hrs:   Temp Pulse Resp BP SpO2   10/09/17 0313 99.5 °F (37.5 °C) 88 18 141/79 95 %   10/08/17 2312 100.4 °F (38 °C) 65 18 126/76 99 %   10/08/17 1913 98.9 °F (37.2 °C) (!) 118 18 109/73 96 %       Pain Assessment  Pain Intensity 1: 0 (10/09/17 0100)  Pain Location 1: Abdomen  Pain Intervention(s) 1: Encouraged PCA  Patient Stated Pain Goal: 0    Ambulating  Yes    Shift report given to oncoming nurse at the bedside.     Paulette Irving RN

## 2017-10-09 NOTE — PROGRESS NOTES
Spoke to Ms. Vilma Kumar and son in room 236. Agreed to STR at [facility]. Referrals sent to [facility] (no beds), [facility] (maybe per Belem English), [facility] (likely, per Devora Brown), [facility] (likely, per Devora Brown), and [facility] (possibly, per Devora Brown). From these, should have a bed offer in the morning for short-term rehab. SNF transfer papers on  desk for Dr. David Thornton to sign in the morning.

## 2017-10-09 NOTE — PROGRESS NOTES
Physical Therapy note: checked on pt for therapy and she was groggy from just taking pain medicine and tired from not sleeping. Said she knew she needed to get up but she just was not up to it right now. Will do best to check back on pt later schedule pending. Thanks.  Sam Cisneros, PT

## 2017-10-09 NOTE — PROGRESS NOTES
H&P/Consult Note/Progress Note/Office Note:   Vincenzo Angulo  MRN: [id number]  PW  Age:76 y.o.       HPI: Jossie Gutierrez L 83 y. o. female who is s/p right hemicolectomy for a RLQ phlegmon involving bowel and benign inflammatory cecal mass with drainage of localized RLQ pseudomonas abscess. She also had biopsy of a left retroperitoneal fatty tumor noted in pre-op CT. This was done after about 2 weeks of bowel rest IV Abx, and TPN failed to improve the RLQ phlegmon on serial CT imaging     Path was benign  DIAGNOSIS   A: Claudene Goodberhane: FIBROSIS WITH CHRONIC INFLAMMATION. B: RIGHT COLON WITH CECAL MASS: SEROSAL CHRONIC INFLAMMATION OF THE APPENDIX AND MILD ACUTE SEROSITIS INVOLVING THE CECUM; FOUR BENIGN, REACTIVE LYMPH NODES. C: RETROPERITONEAL MASS: BENIGN FIBROADIPOSE TISSUE. Electronically signed out on 10/5/2017 12:48 by Vinod Henao. Clinton Bowen MD   Procedures/Addenda   Microscopic Description   A: The specimen consists of a focus of fibrosis involving fibroadipose tissue. Mild chronic inflammation is seen in this area. No atypia is seen. B: Sections of the appendix show chronic serosal inflammation and mild fibrosis. The cecum has areas of acute serositis. No other significant histologic abnormalities identified in sections of the colon. The lymph nodes appear reactive with follicular hyperplasia. No significant atypia seen. C: The specimen consists of benign fibroadipose tissue. No atypia is seen. Dr. Dilia Castorena concurs.      Prior to surgery she originally presented to and was admitted form the ER with a 1 month h/o severe (8/10) RLQ pain.    This was progressively worsening and associated with intermittent N/V. Nothing made it better or worse. She was treated previously with laxatives without improvement. She came the [facility] ER about 5 days prior to admission and was treated with antibiotics for UTI.  No imaging was done.   She came back to the ER on the day of admission and CT scan was obtained as shown below. She is s/p lap francois at [facility] in approx 2012.    She is s/p hysterectomy about 40 yrs ago for benign fibroids. Sandra Alcidess thinks her ovaries remain. She denies associated bloody BMs or diarrhea.            Results for Khris Knight (MRN [id number]) as of 9/19/2017 09:35     Ref. Range 9/18/2017 16:42   CEA Latest Ref Range: 0.0 - 3.0 ng/mL 0.8   CA-125 Latest Ref Range: 1.5 - 35.0 U/mL 5           9/18/17 CT abd/pelvis with contrast with IV but not oral contrast   Minimal linear atelectasis or scarring is present in the lung bases, left greater than right. There is a small to moderate-sized hiatal hernia. No pleural or pericardial fluid.      Small hypoattenuating lesions are present in the right hepatic lobe and left kidney, too small to definitively characterize but statistically likely  representing cysts. There is central biliary ductal ectasia s/p cholecystectomy.      The remaining solid abdominal viscera are unremarkable to the limits of  noncontrast CT. Atherosclerotic abdominal aorta and branch vessels. No upper  abdominal no retroperitoneal lymph node enlargement. Nonopacified small bowel  loops are normal in caliber.      Pelvis: There is somewhat prominent lipomatosis along the left posterior  pararenal fascia and iliopsoas without associated soft tissue component,  measuring up to 12.0 x 23 x 9 cm. The bladder is predominantly decompressed. Uterus not visualized.      Extensive diverticular changes are present in the colon which is otherwise  normal in caliber. There is a large ill-defined inflammatory process in the  right lower quadrant measuring 5.9 x 10.0 x 6.4 cm approximating the ileocecal  junction from which the appendix is not visualized as a distinctly separate  structure. Multiple high attenuation foci are present within or adjacent to the  bowel, suspicious for medication and/or enteroliths.  There is moderate adjacent  inflammatory fat stranding and multiple prominent right lower quadrant  mesenteric lymph nodes, the largest of which measures 1.2 cm in short axis.      Trace dependent free pelvic fluid. No aggressive osseous lesions.      IMPRESSION:  Nonspecific inflammatory process in RLQ at the ileocecal junction. Diff Dx include infectious, and ischemic colitis, appendicitis, inflammatory bowel disease, omental infarct.       Large extraperitoneal predominantly fatty attenuation mass without definite enhancing soft tissue. Size places the patient at risk for sarcoma, the inferior extent of which is not included in the field of view.         9/25/17 CT abd/pelvis with oral and IV contrast   Tiny hypodense lesion near the dome of the liver and another seen within the right posterior lobe. These are too small to further characterize. Otherwise, the liver and spleen enhances homogeneously without discrete lesions. There is no biliary ductal dilatation. Clips are present from prior cholecystectomy. The pancreas and adrenal glands are normal. The kidneys  enhance symmetrically. Bowel loops in the upper abdomen are normal. No definite upper abdominal lymphadenopathy seen.      The RLQ phlegmon seen previously is again noted with significant bowel wall thickening involving the cecum and distal ileum. There is adjacent fat stranding. The large extraperitoneal fatty mass discussed previously is again noted and is unchanged. The bladder and rectum are normal.  No pelvic adenopathy seen. No free air or free fluid seen within the pelvis.       IMPRESSION: No significant interval change when compared with the prior study.     10/2/17 CT abd/pelvis with oral and IV contrast  There is scarring within the lingula with mild bibasilar atelectasis. There is a hiatal hernia present.      CT abdomen: The liver and spleen enhances homogeneously without discrete lesions. There is no biliary ductal dilatation. Clips are present from prior cholecystectomy.  The pancreas and adrenal glands are normal. The kidneys enhance  symmetrically. Bowel loops in the upper abdomen are normal. No definite upper abdominal lymphadenopathy seen.      CT pelvis: No change in the appearance of the large fat density mass within the retroperitoneum on the left. In addition, the right lower quadrant phlegmon with bowel wall thickening is again seen and is unchanged. There is extensive  diverticulosis. The bladder and rectum are normal. No pelvic adenopathy seen. No free air or free fluid seen within the pelvis. Bone window evaluation demonstrates no aggressive osseous lesions.      IMPRESSION: No significant interval change when compared with the prior study.             9/19/17 Abd pain unchanged; Mass-effect in RLQ; PICC requested; on IV ABx; NPO  9/20/17 PICC placed yesterday; on TPN; Nausea persists;  RLQ pain no better since admission and still 8/10  Requests adult diapers secondary to urinary incontinence  9/21/17 says her pain is 8/10 but looks comfortable  9/22/17 pain issues improving  9/23/17 Awake in bed. No new complaints. 9/24/17 Resting in bed. No new complaints. Family at bedside.   9/25/17 Overall improvement in RLQ pain compared to admission  9/26/17 reports RLQ abd pain better since admission  9/27/17 no changes  9/28/17 she feels like RX starting to work with less RLQ pain  9/29/17 slow but steady improvement clinically; still with RLQ pain  9/30/17 RLQ pain better compared to admission  10/1/17 she reports pain 4/10 for only about 5hrs total during the day compared to 8/10 pain 24hrs a day at admission  10/2/17 worsening  Pain now is suprapubic region and LLQ; follow-up CT pending  10/3/17 CT images reviewed, decided to proceed with laparotomy--->OR     10/4/17 POD1; doing OK; correct hypophosphatemia; resume TPN no complaints  10/5/17 POD2 WBC normal; she pulled out her NGT  10/6/17 POD3 fever; wound OK; OOB today; encouraging inc fidelina  10/7/17 POD4 fevers; bld cultures pending; Torres out; she wants rehab bed  10/8/17 POD5 fevers, Culture showing Gram+ Cocci in blood; +yeast PICC Line. +Large BM  10/8/17 POD6 PICC out yesterday;  Chandni clears; await placement        Past Medical History:   Diagnosis Date    Anxiety state, unspecified     GERD (gastroesophageal reflux disease)     Hypercholesterolemia      Past Surgical History:   Procedure Laterality Date    HX CHOLECYSTECTOMY      HX COLONOSCOPY      Dr Jennyfer Gagnon 2012, repeat 5 years    HX GYN      hysterectomy, partial    HX KNEE REPLACEMENT      right     Current Facility-Administered Medications   Medication Dose Route Frequency    vancomycin (VANCOCIN) 1,000 mg in 0.9% sodium chloride (MBP/ADV) 250 mL  1,000 mg IntraVENous Q12H    ciprofloxacin HCl (CIPRO) tablet 500 mg  500 mg Oral Q12H    HYDROmorphone in NS (PF) (DILAUDID) injection 0.2-0.4 mg  0.2-0.4 mg IntraVENous Q3H PRN    oxyCODONE IR (ROXICODONE) tablet 5-10 mg  5-10 mg Oral Q4H PRN    dextrose 5% - 0.45% NaCl with KCl 20 mEq/L infusion  50 mL/hr IntraVENous CONTINUOUS    acetaminophen (TYLENOL) tablet 650 mg  650 mg Oral Q6H PRN    pantoprazole (PROTONIX) tablet 40 mg  40 mg Oral ACB/HS    alum-mag hydroxide-simeth (MYLANTA) oral suspension 30 mL  30 mL Oral Q4H PRN    magic mouthwash (DARÍO) oral suspension 5 mL  5 mL Oral Q6H    fluconazole (DIFLUCAN) 400mg/200 mL IVPB (premix)  400 mg IntraVENous Q24H    diphenhydrAMINE (BENADRYL) injection 25 mg  25 mg IntraVENous Q8H PRN    enoxaparin (LOVENOX) injection 40 mg  40 mg SubCUTAneous Q12H    ondansetron (ZOFRAN) injection 4 mg  4 mg IntraVENous Q4H PRN    promethazine (PHENERGAN) with saline injection 12.5 mg  12.5 mg IntraVENous Q4H PRN    NUTRITIONAL SUPPORT ELECTROLYTE PRN ORDERS   Does Not Apply PRN     Morphine  Social History     Social History    Marital status:      Spouse name: N/A    Number of children: N/A    Years of education: N/A     Social History Main Topics    Smoking status: Former Smoker     Quit date: 1/1/1975    Smokeless tobacco: Never Used    Alcohol use No    Drug use: No    Sexual activity: Not Asked     Other Topics Concern    None     Social History Narrative     History   Smoking Status    Former Smoker    Quit date: 1/1/1975   Smokeless Tobacco    Never Used     Family History   Problem Relation Age of Onset    Diabetes Mother     Heart Disease Mother     Thyroid Disease Mother     Heart Disease Father      ROS: Comprehensive review of systems was otherwise unremarkable except as noted above. Physical Exam:   Visit Vitals    /79 (BP 1 Location: Right arm, BP Patient Position: At rest)    Pulse 88    Temp 99.5 °F (37.5 °C)    Resp 18    Ht 5' 4\" (1.626 m)    Wt 244 lb 9.6 oz (110.9 kg)    SpO2 95%    BMI 41.99 kg/m2     Constitutional: Alert, oriented, cooperative patient in no acute distress; appears stated age    Eyes:Sclera are clear. EOMs intact  ENMT: no external lesions gross hearing normal; no obvious neck masses, no ear or lip lesions, nares normal  CV: RRR. Normal perfusion  Resp: No JVD.  Breathing is  non-labored; no audible wheezing.    GI: obese; Midline incision without cellulitis;  RLQ and pelvic STELLA drain is serosang   Musculoskeletal: unremarkable with normal function. No embolic signs or cyanosis.    Neuro:  Oriented; moves all 4; no focal deficits  Psychiatric: normal affect and mood, no memory impairment    Recent vitals (if inpt):  Patient Vitals for the past 24 hrs:   BP Temp Pulse Resp SpO2 Weight   10/09/17 0437 - - - - - 244 lb 9.6 oz (110.9 kg)   10/09/17 0313 141/79 99.5 °F (37.5 °C) 88 18 95 % -   10/08/17 2312 126/76 100.4 °F (38 °C) 65 18 99 % -   10/08/17 1913 109/73 98.9 °F (37.2 °C) (!) 118 18 96 % -   10/08/17 1521 111/72 98.8 °F (37.1 °C) (!) 115 18 94 % -   10/08/17 1128 118/63 (!) 102.3 °F (39.1 °C) 95 18 94 % -   10/08/17 0727 - - - - 92 % -   10/08/17 0713 128/57 99.8 °F (37.7 °C) 98 16 92 % - Labs:  Recent Labs      10/08/17   0551  10/07/17   0620   WBC  3.9*  3.7*   HGB  8.4*  9.2*   PLT  191  217   NA  137  140   K  3.8  3.8   CL  103  105   CO2  26  28   BUN  13  13   CREA  0.52*  0.48*   GLU  126*  114*   TBILI   --   0.3   SGOT   --   20   ALT   --   26   AP   --   92       Lab Results   Component Value Date/Time    WBC 3.9 10/08/2017 05:51 AM    HGB 8.4 10/08/2017 05:51 AM    PLATELET 168 60/71/0156 05:51 AM    Sodium 137 10/08/2017 05:51 AM    Potassium 3.8 10/08/2017 05:51 AM    Chloride 103 10/08/2017 05:51 AM    CO2 26 10/08/2017 05:51 AM    BUN 13 10/08/2017 05:51 AM    Creatinine 0.52 10/08/2017 05:51 AM    Glucose 126 10/08/2017 05:51 AM    Bilirubin, total 0.3 10/07/2017 06:20 AM    AST (SGOT) 20 10/07/2017 06:20 AM    ALT (SGPT) 26 10/07/2017 06:20 AM    Alk. phosphatase 92 10/07/2017 06:20 AM       I reviewed recent labs and recent radiologic studies. I independently reviewed radiology images for studies I described above or studies I have ordered. Admission date (for inpatients): 9/18/2017   5 Days Post-Op  Procedure(s):  EXPLORATORY LAPAROTOMY, EXCISION OF PERITONEAL NODULE, RIGHT COLECTOMY.     ASSESSMENT/PLAN:  Problem List  Date Reviewed: 10/3/2017          Codes Class Noted    RLQ abdominal phlegmon ICD-10-CM: L02.91  ICD-9-CM: 682.9  10/4/2017        Localized peritonitis ([facility] [address] ICD-10-CM: K65.9  ICD-9-CM: 567.9  10/4/2017        Mass of cecum ICD-10-CM: K63.9  ICD-9-CM: 569.9  10/4/2017        Massive Left Retroperitoneal fatty mass seen on CT scan extending from kidney to groin ICD-10-CM: R19.00  ICD-9-CM: 789.30  9/22/2017        Acute cystitis ICD-10-CM: N30.00  ICD-9-CM: 595.0  9/19/2017        RLQ abdominal mass ICD-10-CM: R19.03  ICD-9-CM: 789.33  9/19/2017        RLQ abdominal pain ICD-10-CM: R10.31  ICD-9-CM: 789.03  9/19/2017        Leukocytosis ICD-10-CM: Z60.192  ICD-9-CM: 288.60  9/19/2017        Non-intractable vomiting with nausea ICD-10-CM: R11.2  ICD-9-CM: 787.01  9/19/2017        * (Principal)Intra-abdominal infection ICD-10-CM: B99.9  ICD-9-CM: 136.9  9/18/2017        Anxiety state, unspecified ICD-10-CM: F41.1  ICD-9-CM: 300.00  Unknown        GERD (gastroesophageal reflux disease) ICD-10-CM: K21.9  ICD-9-CM: 530.81  Unknown        Hypercholesterolemia ICD-10-CM: E78.00  ICD-9-CM: 272.0  Unknown            Principal Problem:    Intra-abdominal infection (9/18/2017)    Active Problems:    Acute cystitis (9/19/2017)      RLQ abdominal mass (9/19/2017)      RLQ abdominal pain (9/19/2017)      Leukocytosis (9/19/2017)      Non-intractable vomiting with nausea (9/19/2017)      Massive Left Retroperitoneal fatty mass seen on CT scan extending from kidney to groin (9/22/2017)      RLQ abdominal phlegmon (10/4/2017)      Localized peritonitis ([address] (10/4/2017)      Mass of cecum (10/4/2017)       Plan:  POD6 right colectomy  BM x 2 with flatus--> advance to fulls    Transfer to SNF tomorrow if bed available and no fevers or emesis  Consulted case management for SNF/rehab bed on 10/7/17   Likely transfer to SNF on PO Cipro and Diflucan depending on final blood culture results    Fevers-->peritoneal cultures grew pseudomonas (intermediate sens to zosyn but sens to quinolones)  Changed IV zosyn to IV cipro on 10/6/17  Blood cultures - Gram+ Cocci in blood; +yeast PICC Line---->PICC Line removed 10/8/17   Now on IV Vanc and diflucan + PO Cipro    Torres out 10/7/17  OOB qday  Empty STELLA PRN  Inc fidelina  GI (pepcid- add to TPN) and VTE (lovenox) prophylaxis ordered

## 2017-10-10 ENCOUNTER — HOME HEALTH ADMISSION (OUTPATIENT)
Dept: HOME HEALTH SERVICES | Facility: HOME HEALTH | Age: 77
End: 2017-10-10
Payer: MEDICARE

## 2017-10-10 VITALS
OXYGEN SATURATION: 97 % | WEIGHT: 244 LBS | BODY MASS INDEX: 41.66 KG/M2 | HEIGHT: 64 IN | DIASTOLIC BLOOD PRESSURE: 69 MMHG | RESPIRATION RATE: 19 BRPM | SYSTOLIC BLOOD PRESSURE: 117 MMHG | TEMPERATURE: 98.7 F | HEART RATE: 82 BPM

## 2017-10-10 LAB
ANION GAP SERPL CALC-SCNC: 6 MMOL/L (ref 7–16)
BUN SERPL-MCNC: 6 MG/DL (ref 8–23)
CALCIUM SERPL-MCNC: 8.4 MG/DL (ref 8.3–10.4)
CHLORIDE SERPL-SCNC: 105 MMOL/L (ref 98–107)
CO2 SERPL-SCNC: 28 MMOL/L (ref 21–32)
CREAT SERPL-MCNC: 0.44 MG/DL (ref 0.6–1)
ERYTHROCYTE [DISTWIDTH] IN BLOOD BY AUTOMATED COUNT: 17.8 % (ref 11.9–14.6)
GLUCOSE SERPL-MCNC: 94 MG/DL (ref 65–100)
HCT VFR BLD AUTO: 27.6 % (ref 35.8–46.3)
HGB BLD-MCNC: 8.6 G/DL (ref 11.7–15.4)
MCH RBC QN AUTO: 24.3 PG (ref 26.1–32.9)
MCHC RBC AUTO-ENTMCNC: 31.2 G/DL (ref 31.4–35)
MCV RBC AUTO: 78 FL (ref 79.6–97.8)
PLATELET # BLD AUTO: 208 K/UL (ref 150–450)
PMV BLD AUTO: 10.6 FL (ref 10.8–14.1)
POTASSIUM SERPL-SCNC: 3.7 MMOL/L (ref 3.5–5.1)
RBC # BLD AUTO: 3.54 M/UL (ref 4.05–5.25)
SODIUM SERPL-SCNC: 139 MMOL/L (ref 136–145)
WBC # BLD AUTO: 5.6 K/UL (ref 4.3–11.1)

## 2017-10-10 PROCEDURE — 74011250636 HC RX REV CODE- 250/636: Performed by: SURGERY

## 2017-10-10 PROCEDURE — 85027 COMPLETE CBC AUTOMATED: CPT | Performed by: SURGERY

## 2017-10-10 PROCEDURE — 36415 COLL VENOUS BLD VENIPUNCTURE: CPT | Performed by: SURGERY

## 2017-10-10 PROCEDURE — 80048 BASIC METABOLIC PNL TOTAL CA: CPT | Performed by: SURGERY

## 2017-10-10 PROCEDURE — 74011250637 HC RX REV CODE- 250/637: Performed by: NURSE PRACTITIONER

## 2017-10-10 PROCEDURE — 74011250637 HC RX REV CODE- 250/637: Performed by: SURGERY

## 2017-10-10 RX ORDER — FLUCONAZOLE 150 MG/1
150 TABLET ORAL DAILY
Qty: 7 TAB | Refills: 0 | Status: SHIPPED | OUTPATIENT
Start: 2017-10-10 | End: 2017-10-17

## 2017-10-10 RX ORDER — CITALOPRAM 10 MG/1
10 TABLET ORAL DAILY
Qty: 90 TAB | Refills: 1 | Status: SHIPPED | OUTPATIENT
Start: 2017-10-10 | End: 2017-11-28 | Stop reason: SDUPTHER

## 2017-10-10 RX ORDER — CIPROFLOXACIN 500 MG/1
500 TABLET ORAL 2 TIMES DAILY
Qty: 20 TAB | Refills: 0 | Status: SHIPPED | OUTPATIENT
Start: 2017-10-10 | End: 2017-11-10

## 2017-10-10 RX ADMIN — VANCOMYCIN HYDROCHLORIDE 1750 MG: 10 INJECTION, POWDER, LYOPHILIZED, FOR SOLUTION INTRAVENOUS at 08:11

## 2017-10-10 RX ADMIN — CIPROFLOXACIN HYDROCHLORIDE 500 MG: 500 TABLET, FILM COATED ORAL at 05:48

## 2017-10-10 RX ADMIN — OXYCODONE HYDROCHLORIDE 10 MG: 5 TABLET ORAL at 08:57

## 2017-10-10 RX ADMIN — ENOXAPARIN SODIUM 40 MG: 40 INJECTION SUBCUTANEOUS at 08:12

## 2017-10-10 RX ADMIN — PANTOPRAZOLE SODIUM 40 MG: 40 TABLET, DELAYED RELEASE ORAL at 06:29

## 2017-10-10 NOTE — PROGRESS NOTES
Pt's D/C instructions completed. Verbalized understanding of all instructions including diet, activity, s/sx to alert MD, medications, wound care, and f/u appointment. Family at [facility].

## 2017-10-10 NOTE — PROGRESS NOTES
Awaiting for [facility] to see patient prior to discharge.

## 2017-10-10 NOTE — PROGRESS NOTES
Problem: Falls - Risk of  Goal: *Absence of Falls  Document Gia Fall Risk and appropriate interventions in the flowsheet.    Outcome: Progressing Towards Goal  Fall Risk Interventions:  Mobility Interventions: Communicate number of staff needed for ambulation/transfer, Patient to call before getting OOB, PT Consult for mobility concerns           Medication Interventions: Evaluate medications/consider consulting pharmacy, Patient to call before getting OOB, Teach patient to arise slowly     Elimination Interventions: Call light in reach, Patient to call for help with toileting needs, Toilet paper/wipes in reach     History of Falls Interventions: Evaluate medications/consider consulting pharmacy

## 2017-10-10 NOTE — PROGRESS NOTES
Presented short-term rehab bed offers to Ms. Jessy Castano and her son in room 236:  [facility], [facility], and [address]. Son adamantly states that Ms. Jessy Castano is NOT going to rehab (after discussions yesterday about STR at a SNF), but that she is going home with home health RN and PT. Left message with Dr. Mary Jo Gunderson (he has already done discharge summary and orders), and ordered Jessica  13. RN and PT for discharge home today.

## 2017-10-10 NOTE — PROGRESS NOTES
Patient to be discharged to SNF today and primary RN will call report and remove IV at time of discharge.

## 2017-10-10 NOTE — PROGRESS NOTES
[facility] [place] - INPATIENT  Face to Face Encounter    Patients Name: Good Traore    YOB: 1940    Ordering Physician: Dr. Deerk You MD    Primary Diagnosis: infected mass  Intra-abdominal infection  INFLAMMATORY RIGHT LOWER QUADRANT MASS    Date of Face to Face:   10/10/2017                                  Face to Face Encounter findings are related to primary reason for home care:   yes. 1. I certify that the patient needs intermittent care as follows: skilled nursing care:  teaching/training of wound care, new medications   physical therapy: strengthening, stretching/ROM, transfer training, gait/stair training, balance training and pt/caregiver education    2. I certify that this patient is homebound, that is: 1) patient requires the use of a cane device, special transportation, or assistance of another to leave the home; or 2) patient's condition makes leaving the home medically contraindicated; and 3) patient has a normal inability to leave the home and leaving the home requires considerable and taxing effort. Patient may leave the home for infrequent and short duration for medical reasons, and occasional absences for non-medical reasons. Homebound status is due to the following functional limitations: Patient currently under activity restrictions secondary to recent surgical procedure, this hinders their ability to safely leave the home. 3. I certify that this patient is under my care and that I, or a nurse practitioner or [phone], or clinical nurse specialist, or certified nurse midwife, working with me, had a Face-to-Face Encounter that meets the physician Face-to-Face Encounter requirements.   The following are the clinical findings from the [address] [place] [address] encounter that support the need for skilled services and is a summary of the encounter: see hospital chart    See hospital chart      Martha Eduardo RN  10/10/2017      THE FOLLOWING TO BE COMPLETED BY THE COMMUNITY PHYSICIAN:    I concur with the findings described above from the F2F encounter that this patient is homebound and in need of a skilled service.     Certifying Physician: _____________________________________      Printed Certifying Physician Name: _____________________________________    Date: _________________

## 2017-10-10 NOTE — ROUTINE PROCESS
END OF SHIFT NOTE:    INTAKE/OUTPUT  10/09 0701 - 10/10 0700  In: 1028 [P.O.:600; I.V.:1801]  Out: 2900 [Urine:2900]  Voiding: YES  Catheter: NO  Drain:   Jimbo-Castañeda Drain 10/03/17 Right; Lower Abdomen (Active)   Site Assessment Clean, dry, & intact 10/9/2017 11:30 PM   Dressing Status Clean, dry, & intact 10/9/2017 11:30 PM   Status Patent; Charged;Draining;Suction (specify 10/9/2017 11:30 PM   Drainage Color Serosanguinous 10/9/2017 11:30 PM   Output (ml) 0 ml 10/9/2017 11:30 PM               Flatus: Patient does have flatus present. Stool:  2 occurrences. Characteristics:  Stool Assessment  Stool Color: Brown  Stool Appearance: Loose  Stool Amount: Small  Stool Source/Status: Rectum    Emesis: 0 occurrences. Characteristics:        VITAL SIGNS  Patient Vitals for the past 12 hrs:   Temp Pulse Resp BP SpO2   10/10/17 0400 98.6 °F (37 °C) 79 18 126/69 96 %   10/10/17 0000 99 °F (37.2 °C) (!) 104 18 140/76 95 %   10/09/17 1913 100 °F (37.8 °C) (!) 112 19 120/65 95 %       Pain Assessment  Pain Intensity 1: 0 (10/09/17 1933)  Pain Location 1: Abdomen  Pain Intervention(s) 1: Medication (see MAR)  Patient Stated Pain Goal: 0    Ambulating  Yes    Shift report given to oncoming nurse at the bedside.     Deloris Gallegos LPN

## 2017-10-11 ENCOUNTER — HOME CARE VISIT (OUTPATIENT)
Dept: SCHEDULING | Facility: HOME HEALTH | Age: 77
End: 2017-10-11
Payer: MEDICARE

## 2017-10-11 VITALS
OXYGEN SATURATION: 95 % | HEART RATE: 82 BPM | RESPIRATION RATE: 18 BRPM | DIASTOLIC BLOOD PRESSURE: 82 MMHG | SYSTOLIC BLOOD PRESSURE: 138 MMHG | TEMPERATURE: 98.2 F

## 2017-10-11 LAB
BACTERIA SPEC CULT: NORMAL
SERVICE CMNT-IMP: NORMAL

## 2017-10-11 PROCEDURE — 3331090001 HH PPS REVENUE CREDIT

## 2017-10-11 PROCEDURE — 3331090002 HH PPS REVENUE DEBIT

## 2017-10-11 PROCEDURE — G0299 HHS/HOSPICE OF RN EA 15 MIN: HCPCS

## 2017-10-11 PROCEDURE — 400013 HH SOC

## 2017-10-12 ENCOUNTER — HOME CARE VISIT (OUTPATIENT)
Dept: SCHEDULING | Facility: HOME HEALTH | Age: 77
End: 2017-10-12
Payer: MEDICARE

## 2017-10-12 LAB
BACTERIA SPEC CULT: ABNORMAL
BACTERIA SPEC CULT: ABNORMAL
GRAM STN SPEC: ABNORMAL
SERVICE CMNT-IMP: ABNORMAL

## 2017-10-12 PROCEDURE — G0151 HHCP-SERV OF PT,EA 15 MIN: HCPCS

## 2017-10-12 PROCEDURE — 3331090002 HH PPS REVENUE DEBIT

## 2017-10-12 PROCEDURE — 3331090001 HH PPS REVENUE CREDIT

## 2017-10-13 ENCOUNTER — HOME CARE VISIT (OUTPATIENT)
Dept: SCHEDULING | Facility: HOME HEALTH | Age: 77
End: 2017-10-13
Payer: MEDICARE

## 2017-10-13 VITALS
HEART RATE: 80 BPM | TEMPERATURE: 97.3 F | SYSTOLIC BLOOD PRESSURE: 140 MMHG | DIASTOLIC BLOOD PRESSURE: 82 MMHG | RESPIRATION RATE: 18 BRPM

## 2017-10-13 PROCEDURE — 3331090001 HH PPS REVENUE CREDIT

## 2017-10-13 PROCEDURE — G0299 HHS/HOSPICE OF RN EA 15 MIN: HCPCS

## 2017-10-13 PROCEDURE — 3331090002 HH PPS REVENUE DEBIT

## 2017-10-14 PROCEDURE — 3331090001 HH PPS REVENUE CREDIT

## 2017-10-14 PROCEDURE — 3331090002 HH PPS REVENUE DEBIT

## 2017-10-15 PROCEDURE — 3331090001 HH PPS REVENUE CREDIT

## 2017-10-15 PROCEDURE — 3331090002 HH PPS REVENUE DEBIT

## 2017-10-16 ENCOUNTER — HOME CARE VISIT (OUTPATIENT)
Dept: SCHEDULING | Facility: HOME HEALTH | Age: 77
End: 2017-10-16
Payer: MEDICARE

## 2017-10-16 VITALS
DIASTOLIC BLOOD PRESSURE: 68 MMHG | HEART RATE: 72 BPM | RESPIRATION RATE: 18 BRPM | TEMPERATURE: 96.4 F | SYSTOLIC BLOOD PRESSURE: 134 MMHG

## 2017-10-16 VITALS
OXYGEN SATURATION: 96 % | SYSTOLIC BLOOD PRESSURE: 112 MMHG | DIASTOLIC BLOOD PRESSURE: 72 MMHG | TEMPERATURE: 97.1 F | HEART RATE: 74 BPM | RESPIRATION RATE: 18 BRPM

## 2017-10-16 PROCEDURE — 3331090001 HH PPS REVENUE CREDIT

## 2017-10-16 PROCEDURE — G0157 HHC PT ASSISTANT EA 15: HCPCS

## 2017-10-16 PROCEDURE — 3331090002 HH PPS REVENUE DEBIT

## 2017-10-17 ENCOUNTER — HOME CARE VISIT (OUTPATIENT)
Dept: HOME HEALTH SERVICES | Facility: HOME HEALTH | Age: 77
End: 2017-10-17
Payer: MEDICARE

## 2017-10-17 ENCOUNTER — HOME CARE VISIT (OUTPATIENT)
Dept: SCHEDULING | Facility: HOME HEALTH | Age: 77
End: 2017-10-17
Payer: MEDICARE

## 2017-10-17 VITALS — DIASTOLIC BLOOD PRESSURE: 70 MMHG | HEART RATE: 72 BPM | SYSTOLIC BLOOD PRESSURE: 110 MMHG | TEMPERATURE: 96.2 F

## 2017-10-17 VITALS
DIASTOLIC BLOOD PRESSURE: 65 MMHG | OXYGEN SATURATION: 96 % | TEMPERATURE: 96.2 F | SYSTOLIC BLOOD PRESSURE: 120 MMHG | RESPIRATION RATE: 16 BRPM | HEART RATE: 72 BPM

## 2017-10-17 PROCEDURE — G0152 HHCP-SERV OF OT,EA 15 MIN: HCPCS

## 2017-10-17 PROCEDURE — G0299 HHS/HOSPICE OF RN EA 15 MIN: HCPCS

## 2017-10-17 PROCEDURE — 3331090001 HH PPS REVENUE CREDIT

## 2017-10-17 PROCEDURE — 3331090002 HH PPS REVENUE DEBIT

## 2017-10-18 ENCOUNTER — HOME CARE VISIT (OUTPATIENT)
Dept: SCHEDULING | Facility: HOME HEALTH | Age: 77
End: 2017-10-18
Payer: MEDICARE

## 2017-10-18 VITALS
DIASTOLIC BLOOD PRESSURE: 70 MMHG | TEMPERATURE: 98.7 F | HEART RATE: 72 BPM | RESPIRATION RATE: 18 BRPM | SYSTOLIC BLOOD PRESSURE: 142 MMHG

## 2017-10-18 PROCEDURE — 3331090001 HH PPS REVENUE CREDIT

## 2017-10-18 PROCEDURE — G0157 HHC PT ASSISTANT EA 15: HCPCS

## 2017-10-18 PROCEDURE — 3331090002 HH PPS REVENUE DEBIT

## 2017-10-19 PROCEDURE — 3331090002 HH PPS REVENUE DEBIT

## 2017-10-19 PROCEDURE — 3331090001 HH PPS REVENUE CREDIT

## 2017-10-20 ENCOUNTER — HOME CARE VISIT (OUTPATIENT)
Dept: SCHEDULING | Facility: HOME HEALTH | Age: 77
End: 2017-10-20
Payer: MEDICARE

## 2017-10-20 VITALS
SYSTOLIC BLOOD PRESSURE: 130 MMHG | TEMPERATURE: 97.1 F | HEART RATE: 86 BPM | RESPIRATION RATE: 17 BRPM | DIASTOLIC BLOOD PRESSURE: 84 MMHG | OXYGEN SATURATION: 94 %

## 2017-10-20 PROCEDURE — 3331090001 HH PPS REVENUE CREDIT

## 2017-10-20 PROCEDURE — G0158 HHC OT ASSISTANT EA 15: HCPCS

## 2017-10-20 PROCEDURE — 3331090002 HH PPS REVENUE DEBIT

## 2017-10-21 PROCEDURE — 3331090001 HH PPS REVENUE CREDIT

## 2017-10-21 PROCEDURE — 3331090002 HH PPS REVENUE DEBIT

## 2017-10-22 PROCEDURE — 3331090002 HH PPS REVENUE DEBIT

## 2017-10-22 PROCEDURE — 3331090001 HH PPS REVENUE CREDIT

## 2017-10-23 ENCOUNTER — HOME CARE VISIT (OUTPATIENT)
Dept: SCHEDULING | Facility: HOME HEALTH | Age: 77
End: 2017-10-23
Payer: MEDICARE

## 2017-10-23 VITALS
DIASTOLIC BLOOD PRESSURE: 78 MMHG | TEMPERATURE: 96.8 F | HEART RATE: 72 BPM | RESPIRATION RATE: 18 BRPM | SYSTOLIC BLOOD PRESSURE: 118 MMHG

## 2017-10-23 PROCEDURE — 3331090001 HH PPS REVENUE CREDIT

## 2017-10-23 PROCEDURE — 3331090002 HH PPS REVENUE DEBIT

## 2017-10-23 PROCEDURE — G0157 HHC PT ASSISTANT EA 15: HCPCS

## 2017-10-24 PROCEDURE — 3331090001 HH PPS REVENUE CREDIT

## 2017-10-24 PROCEDURE — 3331090002 HH PPS REVENUE DEBIT

## 2017-10-25 ENCOUNTER — HOME CARE VISIT (OUTPATIENT)
Dept: SCHEDULING | Facility: HOME HEALTH | Age: 77
End: 2017-10-25
Payer: MEDICARE

## 2017-10-25 ENCOUNTER — HOME CARE VISIT (OUTPATIENT)
Dept: HOME HEALTH SERVICES | Facility: HOME HEALTH | Age: 77
End: 2017-10-25
Payer: MEDICARE

## 2017-10-25 ENCOUNTER — HOME CARE VISIT (OUTPATIENT)
Dept: SCHEDULING | Facility: HOME HEALTH | Age: 77
End: 2017-10-25

## 2017-10-25 VITALS
SYSTOLIC BLOOD PRESSURE: 120 MMHG | DIASTOLIC BLOOD PRESSURE: 86 MMHG | TEMPERATURE: 98.2 F | OXYGEN SATURATION: 97 % | RESPIRATION RATE: 16 BRPM | HEART RATE: 98 BPM

## 2017-10-25 PROBLEM — R10.31 RLQ ABDOMINAL PAIN: Status: RESOLVED | Noted: 2017-09-19 | Resolved: 2017-10-25

## 2017-10-25 PROBLEM — K63.89 MASS OF CECUM: Status: RESOLVED | Noted: 2017-10-04 | Resolved: 2017-10-25

## 2017-10-25 PROBLEM — R11.2 NON-INTRACTABLE VOMITING WITH NAUSEA: Status: RESOLVED | Noted: 2017-09-19 | Resolved: 2017-10-25

## 2017-10-25 PROBLEM — K65.9 LOCALIZED PERITONITIS (HCC): Status: RESOLVED | Noted: 2017-10-04 | Resolved: 2017-10-25

## 2017-10-25 PROBLEM — D72.829 LEUKOCYTOSIS: Status: RESOLVED | Noted: 2017-09-19 | Resolved: 2017-10-25

## 2017-10-25 PROBLEM — B99.9 INTRA-ABDOMINAL INFECTION: Status: RESOLVED | Noted: 2017-09-18 | Resolved: 2017-10-25

## 2017-10-25 PROCEDURE — 3331090002 HH PPS REVENUE DEBIT

## 2017-10-25 PROCEDURE — G0299 HHS/HOSPICE OF RN EA 15 MIN: HCPCS

## 2017-10-25 PROCEDURE — 3331090001 HH PPS REVENUE CREDIT

## 2017-10-26 PROCEDURE — 3331090002 HH PPS REVENUE DEBIT

## 2017-10-26 PROCEDURE — 3331090001 HH PPS REVENUE CREDIT

## 2017-10-27 ENCOUNTER — HOME CARE VISIT (OUTPATIENT)
Dept: SCHEDULING | Facility: HOME HEALTH | Age: 77
End: 2017-10-27
Payer: MEDICARE

## 2017-10-27 PROCEDURE — G0299 HHS/HOSPICE OF RN EA 15 MIN: HCPCS

## 2017-10-27 PROCEDURE — 3331090002 HH PPS REVENUE DEBIT

## 2017-10-27 PROCEDURE — 3331090001 HH PPS REVENUE CREDIT

## 2017-10-28 PROCEDURE — 3331090001 HH PPS REVENUE CREDIT

## 2017-10-28 PROCEDURE — 3331090002 HH PPS REVENUE DEBIT

## 2017-10-29 VITALS
TEMPERATURE: 97.7 F | SYSTOLIC BLOOD PRESSURE: 120 MMHG | OXYGEN SATURATION: 95 % | HEART RATE: 76 BPM | DIASTOLIC BLOOD PRESSURE: 70 MMHG

## 2017-10-29 PROCEDURE — 3331090001 HH PPS REVENUE CREDIT

## 2017-10-29 PROCEDURE — 3331090002 HH PPS REVENUE DEBIT

## 2017-10-30 ENCOUNTER — HOME CARE VISIT (OUTPATIENT)
Dept: SCHEDULING | Facility: HOME HEALTH | Age: 77
End: 2017-10-30
Payer: MEDICARE

## 2017-10-30 VITALS
DIASTOLIC BLOOD PRESSURE: 78 MMHG | SYSTOLIC BLOOD PRESSURE: 126 MMHG | TEMPERATURE: 97.8 F | RESPIRATION RATE: 18 BRPM | HEART RATE: 78 BPM

## 2017-10-30 PROCEDURE — G0157 HHC PT ASSISTANT EA 15: HCPCS

## 2017-10-30 PROCEDURE — 3331090002 HH PPS REVENUE DEBIT

## 2017-10-30 PROCEDURE — 3331090001 HH PPS REVENUE CREDIT

## 2017-10-31 ENCOUNTER — HOME CARE VISIT (OUTPATIENT)
Dept: SCHEDULING | Facility: HOME HEALTH | Age: 77
End: 2017-10-31
Payer: MEDICARE

## 2017-10-31 VITALS
DIASTOLIC BLOOD PRESSURE: 72 MMHG | OXYGEN SATURATION: 97 % | SYSTOLIC BLOOD PRESSURE: 110 MMHG | TEMPERATURE: 97.7 F | HEART RATE: 76 BPM | RESPIRATION RATE: 16 BRPM

## 2017-10-31 PROCEDURE — G0299 HHS/HOSPICE OF RN EA 15 MIN: HCPCS

## 2017-10-31 PROCEDURE — 3331090002 HH PPS REVENUE DEBIT

## 2017-10-31 PROCEDURE — 3331090001 HH PPS REVENUE CREDIT

## 2017-11-01 PROCEDURE — 3331090002 HH PPS REVENUE DEBIT

## 2017-11-01 PROCEDURE — 3331090001 HH PPS REVENUE CREDIT

## 2017-11-02 PROCEDURE — 3331090001 HH PPS REVENUE CREDIT

## 2017-11-02 PROCEDURE — 3331090002 HH PPS REVENUE DEBIT

## 2017-11-03 ENCOUNTER — HOME CARE VISIT (OUTPATIENT)
Dept: SCHEDULING | Facility: HOME HEALTH | Age: 77
End: 2017-11-03
Payer: MEDICARE

## 2017-11-03 VITALS
HEART RATE: 71 BPM | OXYGEN SATURATION: 98 % | SYSTOLIC BLOOD PRESSURE: 124 MMHG | RESPIRATION RATE: 16 BRPM | TEMPERATURE: 96.4 F | DIASTOLIC BLOOD PRESSURE: 69 MMHG

## 2017-11-03 PROCEDURE — 3331090001 HH PPS REVENUE CREDIT

## 2017-11-03 PROCEDURE — 3331090002 HH PPS REVENUE DEBIT

## 2017-11-03 PROCEDURE — G0151 HHCP-SERV OF PT,EA 15 MIN: HCPCS

## 2019-03-12 PROBLEM — E03.9 ACQUIRED HYPOTHYROIDISM: Status: ACTIVE | Noted: 2019-03-12

## 2019-03-27 PROBLEM — E66.01 OBESITY, MORBID (HCC): Status: ACTIVE | Noted: 2019-03-27

## 2021-09-24 NOTE — PROGRESS NOTES
- Please call us if your compression wraps fall more than 1-2 inches below the bend of the knee. Remove the wraps if you experience any shortness of breathe or notice your toes turning blue/purple and then give us a call. Call if they are too painful. Call if they get wet. If it is a weekend and the wraps fall down, are too painful, or get wet take the wraps off and put on another form of compression. Compression such as velcro wraps, compression stockings, short stretch bandages, or tubular compression. Apply one of these until you can be seen in clinic. Please call us if you have any questions [phone].    - Treatment:  Layered Compression Bandaging (2-layer)    What is it?  The layered compression bandaging has a layer of absorbent material that will soak up drainage.     Why we do it.   This is done to treat swelling, wounds, or both.  This will in turn help circulation and healing.    How to care for your bandages.  The wraps need to be kept dry. If  the wraps become wet, remove them and call the clinic to have another wrap applied.    What to expect.  It is common for the wraps to be uncomfortable at the beginning. The first two days are usually the hardest; then they will become more comfortable.       Elevating your legs will help the discomfort. Try to elevate your legs as much as possible.    If rest and elevation does not help your discomfort, call your provider.  If your provider is not available you can remove the wrap and leave a message for further instructions.    - Swelling and Compression Therapy    Swelling in the legs can be caused by many reasons. No matter what the reason, treatment usually includes some type of compression. This may be done with a support sock, dressing, ace wrap, or layered wraps.     It is important to treat the swelling for many reasons. If the swelling is not treated you may develop blisters that can lead to ulcerations. This is caused when extra fluid goes into tissue  H&P/Consult Note/Progress Note/Office Note:   Madalyn Pallas  MRN: [id number]  :1940  Age:76 y.o.    HPI: Bismark Posadas U 59 y. o. female who reports a 1 month h/o RLQ pain.        This was progressively worsening and associated with intermittent N/V. It was moderate in intensity (8/10) leading up to admission. Nothing makes it better or worse. She was treated previously with laxatives without improvement. She came the [facility] ER about 5 days prior to admission and was treated with antibiotics for UTI.  No imaging was done. She came back to the ER on the day of admission and CT scan was obtained as shown below. She is s/p lap francois at [facility] [place] in approx .    She is s/p hysterectomy about 40 yrs ago for benign fibroids. Delgado Pekely thinks her ovaries remain. She denies associated bloody BMs or diarrhea.            Results for Americo Nicole (MRN [id number]) as of 2017 09:35     Ref. Range 2017 16:42   CEA Latest Ref Range: 0.0 - 3.0 ng/mL 0.8   CA-125 Latest Ref Range: 1.5 - 35.0 U/mL 5           17 CT abd/pelvis with contrast with IV but not oral contrast   Minimal linear atelectasis or scarring is present in the lung bases,  left greater than right. There is a small to moderate-sized hiatal hernia. No  pleural or pericardial fluid.      Small hypoattenuating lesions are present in the right hepatic lobe and left  kidney, too small to definitively characterize but statistically likely  representing cysts. There is central biliary ductal ectasia status post  cholecystectomy.      The remaining solid abdominal viscera are unremarkable to the limits of  noncontrast CT. Atherosclerotic abdominal aorta and branch vessels. No upper  abdominal no retroperitoneal lymph node enlargement. Nonopacified small bowel  loops are normal in caliber.      Pelvis:  There is somewhat prominent lipomatosis along the left posterior  pararenal fascia and iliopsoas without associated soft tissue causing damage and blocking blood flow to the tissue.     It is important that you wear your compression every day, including days that you will be seen in clinic.     Compression is often the most important part of treating leg wounds. Without controlling the swelling it is often not possible to heal wounds.     Going without compression for even brief periods of time can be damaging to your legs and your health.  Your compression should be put on first thing in the morning. Take the compression off at night only when instructed by your care provider to do so. Sometimes wearing compression 24 hours a day will be recommended.       If you are having difficulty wearing your compression it is important to notify your care provider so that other options may be reviewed.    Thank you for choosing [facility]. Please call us if you have any questions [phone].     component,  measuring up to 12.0 x 23 x 9 cm. The bladder is predominantly decompressed. Uterus not visualized.      Extensive diverticular changes are present in the colon which is otherwise  normal in caliber. There is a large ill-defined inflammatory process in the  right lower quadrant measuring 5.9 x 10.0 x 6.4 cm approximating the ileocecal  junction from which the appendix is not visualized as a distinctly separate  structure. Multiple high attenuation foci are present within or adjacent to the  bowel, suspicious for medication and/or enteroliths. There is moderate adjacent  inflammatory fat stranding and multiple prominent right lower quadrant  mesenteric lymph nodes, the largest of which measures 1.2 cm in short axis.      Trace dependent free pelvic fluid. No aggressive osseous lesions.      IMPRESSION:      1. Nonspecific inflammatory process in the right lower quadrant at the ileocecal  junction. Differential considerations include infectious, and ischemic colitis,  appendicitis, inflammatory bowel disease, omental infarct. Surgical consultation  recommended.      2. Large extraperitoneal predominantly fatty attenuation mass without definite  enhancing soft tissue. Size places the patient at risk for sarcoma, the inferior  extent of which is not included in the field of view.          9/19/17 Abd pain unchanged; Mass-effect in RLQ; PICC requested; on IV ABx; NPO  9/20/17 PICC placed yesterday; on TPN; Nausea persists;  RLQ pain no better since admission and still 8/10  Requests adult diapers secondary to urinary incontinence  9/21/17 says her pain is 8/10 but looks comfortable  9/22/17 pain issues improving  9/23/17 Awake in bed. No new complaints. 9/24/17 Resting in bed. No new complaints. Family at bedside.   9/25/17 Overall improvement in RLQ pain compared to admission          Past Medical History:   Diagnosis Date    Anxiety state, unspecified     GERD (gastroesophageal reflux disease)     Hypercholesterolemia      Past Surgical History:   Procedure Laterality Date    HX CHOLECYSTECTOMY      HX COLONOSCOPY      Dr Michell Brian 2012, repeat 5 years    HX GYN      hysterectomy, partial    HX KNEE REPLACEMENT      right     Current Facility-Administered Medications   Medication Dose Route Frequency    saline peripheral flush soln 10 mL  10 mL InterCATHeter RAD ONCE    sodium chloride 0.9 % bolus infusion 100 mL  100 mL IntraVENous RAD ONCE    iopamidol (ISOVUE-370) 76 % injection 100 mL  100 mL IntraVENous RAD ONCE    citalopram (CELEXA) tablet 10 mg  10 mg Oral DAILY    enoxaparin (LOVENOX) injection 40 mg  40 mg SubCUTAneous Q12H    TPN ADULT - dextrose 10% amino acid 4.25%   IntraVENous QPM    ondansetron (ZOFRAN) injection 4 mg  4 mg IntraVENous Q4H PRN    promethazine (PHENERGAN) with saline injection 12.5 mg  12.5 mg IntraVENous Q4H PRN    HYDROmorphone (PF) (DILAUDID) injection 0.2-0.3 mg  0.2-0.3 mg IntraVENous Q4H PRN    NUTRITIONAL SUPPORT ELECTROLYTE PRN ORDERS   Does Not Apply PRN    fat emulsion 20% (LIPOSYN, INTRALIPID) infusion 250 mL  250 mL IntraVENous QPM    sodium chloride (NS) flush 20 mL  20 mL InterCATHeter Q8H    heparin (porcine) pf 600 Units  600 Units InterCATHeter Q8H    sodium chloride (NS) flush 20 mL  20 mL InterCATHeter PRN    heparin (porcine) pf 600 Units  600 Units InterCATHeter PRN    acetaminophen (TYLENOL) tablet 1,000 mg  1,000 mg Oral Q6H PRN    oxyCODONE IR (ROXICODONE) tablet 5 mg  5 mg Oral Q4H PRN    Or    oxyCODONE IR (ROXICODONE) tablet 10 mg  10 mg Oral Q4H PRN    piperacillin-tazobactam (ZOSYN) 3.375 g in 0.9% sodium chloride (MBP/ADV) 100 mL  3.375 g IntraVENous Q8H     Morphine  Social History     Social History    Marital status:      Spouse name: N/A    Number of children: N/A    Years of education: N/A     Social History Main Topics    Smoking status: Former Smoker     Quit date: 1/1/1975    Smokeless tobacco: Never Used   Hershell Lisandra Alcohol use No    Drug use: No    Sexual activity: Not on file     Other Topics Concern    Not on file     Social History Narrative     History   Smoking Status    Former Smoker    Quit date: 1/1/1975   Smokeless Tobacco    Never Used     Family History   Problem Relation Age of Onset    Diabetes Mother     Heart Disease Mother     Thyroid Disease Mother     Heart Disease Father      ROS:  Comprehensive review of systems was otherwise unremarkable except as noted above. Physical Exam:   Visit Vitals    /54 (BP 1 Location: Left arm, BP Patient Position: At rest)    Pulse 84    Temp 98.7 °F (37.1 °C)    Resp 18    Ht 5' 4\" (1.626 m)    Wt 104.3 kg (230 lb)    SpO2 95%    BMI 39.48 kg/m2     Constitutional: Alert, oriented, cooperative patient in no acute distress; appears stated age    Eyes:Sclera are clear. EOMs intact  ENMT: no external lesions gross hearing normal; no obvious neck masses, no ear or lip lesions, nares normal  CV: RRR. Normal perfusion  Resp: No JVD.  Breathing is  non-labored; no audible wheezing.    GI: obese; firmness/fullness/mass-effect in RLQ is a little softer than at admission; tender to deeper palpation; no guarding    Musculoskeletal: unremarkable with normal function. No embolic signs or cyanosis.    Neuro:  Oriented; moves all 4; no focal deficits  Psychiatric: normal affect and mood, no memory impairment    Recent vitals (if inpt):  Patient Vitals for the past 24 hrs:   BP Temp Pulse Resp SpO2 Weight   09/25/17 0626 - - - - - 104.3 kg (230 lb)   09/25/17 0349 104/54 98.7 °F (37.1 °C) 84 18 95 % -   09/24/17 2352 123/62 98.2 °F (36.8 °C) 71 18 94 % -   09/24/17 2030 117/74 98.4 °F (36.9 °C) 70 18 96 % -   09/24/17 1552 113/52 98.2 °F (36.8 °C) 62 16 96 % -   09/24/17 1059 132/71 97.9 °F (36.6 °C) 67 18 96 % -   09/24/17 0823 140/75 99.5 °F (37.5 °C) 69 18 97 % -       Labs:  Recent Labs      09/25/17   0443   WBC  11.7*   HGB  11.0*   PLT  356   NA  140   K  4.2 CL  107   CO2  24   BUN  19   CREA  0.62   GLU  102*   TBILI  0.2   SGOT  34   ALT  38   AP  92       Lab Results   Component Value Date/Time    WBC 11.7 09/25/2017 04:43 AM    HGB 11.0 09/25/2017 04:43 AM    PLATELET 097 01/63/0517 04:43 AM    Sodium 140 09/25/2017 04:43 AM    Potassium 4.2 09/25/2017 04:43 AM    Chloride 107 09/25/2017 04:43 AM    CO2 24 09/25/2017 04:43 AM    BUN 19 09/25/2017 04:43 AM    Creatinine 0.62 09/25/2017 04:43 AM    Glucose 102 09/25/2017 04:43 AM    Bilirubin, total 0.2 09/25/2017 04:43 AM    AST (SGOT) 34 09/25/2017 04:43 AM    ALT (SGPT) 38 09/25/2017 04:43 AM    Alk. phosphatase 92 09/25/2017 04:43 AM       I reviewed recent labs and recent radiologic studies. I independently reviewed radiology images for studies I described above or studies I have ordered.    Admission date (for inpatients): 9/18/2017   * No surgery found *  * No surgery found *    ASSESSMENT/PLAN:  Problem List  Date Reviewed: 9/5/2017          Codes Class Noted    Massive Left Retroperitoneal fatty mass seen on CT scan extending from kidney to groin ICD-10-CM: R19.00  ICD-9-CM: 789.30  9/22/2017        Acute cystitis ICD-10-CM: N30.00  ICD-9-CM: 595.0  9/19/2017        RLQ abdominal mass ICD-10-CM: R19.03  ICD-9-CM: 789.33  9/19/2017        RLQ abdominal pain ICD-10-CM: R10.31  ICD-9-CM: 789.03  9/19/2017        Leukocytosis ICD-10-CM: D72.829  ICD-9-CM: 288.60  9/19/2017        Non-intractable vomiting with nausea ICD-10-CM: R11.2  ICD-9-CM: 787.01  9/19/2017        * (Principal)Intra-abdominal infection ICD-10-CM: B99.9  ICD-9-CM: 136.9  9/18/2017        Anxiety state, unspecified ICD-10-CM: F41.1  ICD-9-CM: 300.00  Unknown        GERD (gastroesophageal reflux disease) ICD-10-CM: K21.9  ICD-9-CM: 530.81  Unknown        Hypercholesterolemia ICD-10-CM: E78.00  ICD-9-CM: 272.0  Unknown            Principal Problem:    Intra-abdominal infection (9/18/2017)    Active Problems:    Acute cystitis (9/19/2017) RLQ abdominal mass (9/19/2017)      RLQ abdominal pain (9/19/2017)      Leukocytosis (9/19/2017)      Non-intractable vomiting with nausea (9/19/2017)      Massive Left Retroperitoneal fatty mass seen on CT scan extending from kidney to groin (9/22/2017)       RLQ phlegmon involving bowel with RLQ pain, N/V and delayed presentation and 1 month prodrome  NPO/Bowel rest, TPN via PICC  IV Zosyn   Repeat CT today-->She may need IR drainage if on follow-up imaging an abscess develops (or possible perc biopsy if solid tumor in RLQ)  Stool cultures to rule out infectious enteritis not listed and never got done (possibly not specimen)      Differential Dx  Ruptured (delayed presentation) appendictis with RLQ phelgmon  Malignancy (carcinoma, sarcoma, ovarian carcinoma)  Epiploicae Appendagitis  IBD  Diverticulitis  Ischemic Enteritis  Infectious Enteritis      In addition, there is also a left retroperitoneal infiltrative fatty mass-->rule out liposarcoma  Further workup for this on hold until we get resolution/stability of the RLQ inflammatory process  CEA and  were normal          Adult diaper for urinary incontinence - Pt couldn't make it to [facility] and wet bed  IV narcotic prn pain  IV Zofran/phenergan for nausea prn  GI (pepcid- add to TPN) and VTE (lovenox) prophylaxis ordered

## 2022-03-19 PROBLEM — E03.9 ACQUIRED HYPOTHYROIDISM: Status: ACTIVE | Noted: 2019-03-12

## 2022-03-19 PROBLEM — R19.03 RLQ ABDOMINAL MASS: Status: ACTIVE | Noted: 2017-09-19

## 2022-03-19 PROBLEM — R19.00 RETROPERITONEAL MASS: Status: ACTIVE | Noted: 2017-09-22

## 2022-03-19 PROBLEM — L02.91 PHLEGMON: Status: ACTIVE | Noted: 2017-10-04

## 2022-03-19 PROBLEM — N30.00 ACUTE CYSTITIS: Status: ACTIVE | Noted: 2017-09-19

## 2022-03-20 PROBLEM — E66.01 OBESITY, MORBID (HCC): Status: ACTIVE | Noted: 2019-03-27

## 2023-05-08 NOTE — PROGRESS NOTES
Ke Jones (: 1940) presents today to establish care and for other conditions. She has past hx of hyperlipidemia, anxiety, GERD, allergic rhinitis, right eye vision problems. She walks with a cane. Here with her grand daughter Shahzad No. States she thinks she also has some memory issues. Last blood work I can see from 2019 showing a1c 5.9. Normal TSH 2019. Cholesterol- on statin. Do not see any lipids done recently. GERD- on omeprazole. Was taking BID but now only taking daily. States symptoms controlled. Thyroid- on synthroid. Normal TSH 2019. Has hx of cataracts. Can not see good out of right eye. Does not have ophthalmologist. She has blurry vision- this has been going on for years. Had the shingle vaccine- had one - due. Asked me about covid vaccines- she has had one and I told her she could have booster but she refused. Chief Complaint   Patient presents with    New Patient     Establish care      Patient Active Problem List   Diagnosis    RLQ abdominal mass    Acquired hypothyroidism    Phlegmon    Retroperitoneal mass    Hypercholesterolemia    Obesity, morbid (HCC)    GERD (gastroesophageal reflux disease)    Anxiety state     Past Medical History:   Diagnosis Date    High cholesterol     Hypothyroidism     Indigestion     Vertigo      Past Surgical History:   Procedure Laterality Date    CATARACT EXTRACTION      HYSTERECTOMY (CERVIX STATUS UNKNOWN)      REPAIR RETINAL TEAR/HOLE Right     STOMACH SURGERY      took half of stomach    TOTAL KNEE ARTHROPLASTY Right      History reviewed. No pertinent family history. Social History     Socioeconomic History    Marital status:       Spouse name: None    Number of children: None    Years of education: None    Highest education level: None   Tobacco Use    Smoking status: Former     Types: Cigarettes    Smokeless tobacco: Never

## 2023-05-09 ENCOUNTER — OFFICE VISIT (OUTPATIENT)
Dept: FAMILY MEDICINE CLINIC | Facility: CLINIC | Age: 83
End: 2023-05-09
Payer: MEDICARE

## 2023-05-09 VITALS
WEIGHT: 225 LBS | BODY MASS INDEX: 38.41 KG/M2 | RESPIRATION RATE: 16 BRPM | SYSTOLIC BLOOD PRESSURE: 120 MMHG | TEMPERATURE: 97.5 F | HEIGHT: 64 IN | DIASTOLIC BLOOD PRESSURE: 80 MMHG | OXYGEN SATURATION: 96 % | HEART RATE: 111 BPM

## 2023-05-09 DIAGNOSIS — J30.9 ALLERGIC RHINITIS, UNSPECIFIED SEASONALITY, UNSPECIFIED TRIGGER: ICD-10-CM

## 2023-05-09 DIAGNOSIS — E78.2 MIXED HYPERLIPIDEMIA: ICD-10-CM

## 2023-05-09 DIAGNOSIS — F32.A DEPRESSION, UNSPECIFIED DEPRESSION TYPE: ICD-10-CM

## 2023-05-09 DIAGNOSIS — H53.8 BLURRY VISION, RIGHT EYE: ICD-10-CM

## 2023-05-09 DIAGNOSIS — R42 DIZZINESS: ICD-10-CM

## 2023-05-09 DIAGNOSIS — Z76.89 ENCOUNTER TO ESTABLISH CARE: ICD-10-CM

## 2023-05-09 DIAGNOSIS — R73.03 PRE-DIABETES: ICD-10-CM

## 2023-05-09 DIAGNOSIS — E66.01 SEVERE OBESITY (BMI 35.0-39.9) WITH COMORBIDITY (HCC): ICD-10-CM

## 2023-05-09 DIAGNOSIS — K21.9 GASTROESOPHAGEAL REFLUX DISEASE, UNSPECIFIED WHETHER ESOPHAGITIS PRESENT: Primary | ICD-10-CM

## 2023-05-09 DIAGNOSIS — E03.9 ACQUIRED HYPOTHYROIDISM: ICD-10-CM

## 2023-05-09 DIAGNOSIS — D50.9 IRON DEFICIENCY ANEMIA, UNSPECIFIED IRON DEFICIENCY ANEMIA TYPE: ICD-10-CM

## 2023-05-09 PROBLEM — F41.1 ANXIETY STATE: Status: ACTIVE | Noted: 2021-03-19

## 2023-05-09 PROBLEM — N30.00 ACUTE CYSTITIS: Status: RESOLVED | Noted: 2017-09-19 | Resolved: 2023-05-09

## 2023-05-09 LAB
EST. AVERAGE GLUCOSE BLD GHB EST-MCNC: 114 MG/DL
HBA1C MFR BLD: 5.6 % (ref 4.8–5.6)

## 2023-05-09 PROCEDURE — 1090F PRES/ABSN URINE INCON ASSESS: CPT | Performed by: NURSE PRACTITIONER

## 2023-05-09 PROCEDURE — G8400 PT W/DXA NO RESULTS DOC: HCPCS | Performed by: NURSE PRACTITIONER

## 2023-05-09 PROCEDURE — 1123F ACP DISCUSS/DSCN MKR DOCD: CPT | Performed by: NURSE PRACTITIONER

## 2023-05-09 PROCEDURE — 1036F TOBACCO NON-USER: CPT | Performed by: NURSE PRACTITIONER

## 2023-05-09 PROCEDURE — G8417 CALC BMI ABV UP PARAM F/U: HCPCS | Performed by: NURSE PRACTITIONER

## 2023-05-09 PROCEDURE — G8427 DOCREV CUR MEDS BY ELIG CLIN: HCPCS | Performed by: NURSE PRACTITIONER

## 2023-05-09 PROCEDURE — 99204 OFFICE O/P NEW MOD 45 MIN: CPT | Performed by: NURSE PRACTITIONER

## 2023-05-09 RX ORDER — PRAVASTATIN SODIUM 40 MG
40 TABLET ORAL DAILY
Qty: 90 TABLET | Refills: 0 | Status: SHIPPED | OUTPATIENT
Start: 2023-05-09

## 2023-05-09 RX ORDER — OMEPRAZOLE 40 MG/1
40 CAPSULE, DELAYED RELEASE ORAL DAILY
Qty: 90 CAPSULE | Refills: 0 | Status: SHIPPED | OUTPATIENT
Start: 2023-05-09 | End: 2023-08-07

## 2023-05-09 RX ORDER — FLUTICASONE PROPIONATE 50 MCG
2 SPRAY, SUSPENSION (ML) NASAL DAILY
Qty: 16 G | Refills: 0 | Status: SHIPPED | OUTPATIENT
Start: 2023-05-09

## 2023-05-09 RX ORDER — MECLIZINE HCL 12.5 MG/1
12.5 TABLET ORAL 3 TIMES DAILY PRN
Qty: 90 TABLET | Refills: 1 | Status: SHIPPED | OUTPATIENT
Start: 2023-05-09 | End: 2023-07-08

## 2023-05-09 RX ORDER — LEVOTHYROXINE SODIUM 0.05 MG/1
50 TABLET ORAL
Qty: 90 TABLET | Refills: 0 | Status: SHIPPED | OUTPATIENT
Start: 2023-05-09

## 2023-05-09 ASSESSMENT — PATIENT HEALTH QUESTIONNAIRE - PHQ9
SUM OF ALL RESPONSES TO PHQ9 QUESTIONS 1 & 2: 0
SUM OF ALL RESPONSES TO PHQ QUESTIONS 1-9: 0
2. FEELING DOWN, DEPRESSED OR HOPELESS: 0
1. LITTLE INTEREST OR PLEASURE IN DOING THINGS: 0

## 2023-05-10 LAB
ALBUMIN SERPL-MCNC: 3.7 G/DL (ref 3.2–4.6)
ALBUMIN/GLOB SERPL: 1.2 (ref 0.4–1.6)
ALP SERPL-CCNC: 54 U/L (ref 50–136)
ALT SERPL-CCNC: 20 U/L (ref 12–65)
ANION GAP SERPL CALC-SCNC: 4 MMOL/L (ref 2–11)
AST SERPL-CCNC: 13 U/L (ref 15–37)
BASOPHILS # BLD: 0.1 K/UL (ref 0–0.2)
BASOPHILS NFR BLD: 1 % (ref 0–2)
BILIRUB SERPL-MCNC: 0.4 MG/DL (ref 0.2–1.1)
BUN SERPL-MCNC: 12 MG/DL (ref 8–23)
CALCIUM SERPL-MCNC: 9.3 MG/DL (ref 8.3–10.4)
CHLORIDE SERPL-SCNC: 106 MMOL/L (ref 101–110)
CHOLEST SERPL-MCNC: 170 MG/DL
CO2 SERPL-SCNC: 27 MMOL/L (ref 21–32)
CREAT SERPL-MCNC: 0.7 MG/DL (ref 0.6–1)
DIFFERENTIAL METHOD BLD: ABNORMAL
EOSINOPHIL # BLD: 0.2 K/UL (ref 0–0.8)
EOSINOPHIL NFR BLD: 3 % (ref 0.5–7.8)
ERYTHROCYTE [DISTWIDTH] IN BLOOD BY AUTOMATED COUNT: 18.7 % (ref 11.9–14.6)
GLOBULIN SER CALC-MCNC: 3.1 G/DL (ref 2.8–4.5)
GLUCOSE SERPL-MCNC: 89 MG/DL (ref 65–100)
HCT VFR BLD AUTO: 34.5 % (ref 35.8–46.3)
HDLC SERPL-MCNC: 46 MG/DL (ref 40–60)
HDLC SERPL: 3.7
HGB BLD-MCNC: 9.6 G/DL (ref 11.7–15.4)
IMM GRANULOCYTES # BLD AUTO: 0 K/UL (ref 0–0.5)
IMM GRANULOCYTES NFR BLD AUTO: 0 % (ref 0–5)
LDLC SERPL CALC-MCNC: 97.6 MG/DL
LYMPHOCYTES # BLD: 3.2 K/UL (ref 0.5–4.6)
LYMPHOCYTES NFR BLD: 46 % (ref 13–44)
MCH RBC QN AUTO: 21.7 PG (ref 26.1–32.9)
MCHC RBC AUTO-ENTMCNC: 27.8 G/DL (ref 31.4–35)
MCV RBC AUTO: 77.9 FL (ref 82–102)
MONOCYTES # BLD: 0.5 K/UL (ref 0.1–1.3)
MONOCYTES NFR BLD: 8 % (ref 4–12)
NEUTS SEG # BLD: 2.9 K/UL (ref 1.7–8.2)
NEUTS SEG NFR BLD: 43 % (ref 43–78)
NRBC # BLD: 0 K/UL (ref 0–0.2)
PLATELET # BLD AUTO: 419 K/UL (ref 150–450)
PMV BLD AUTO: 10 FL (ref 9.4–12.3)
POTASSIUM SERPL-SCNC: 4.4 MMOL/L (ref 3.5–5.1)
PROT SERPL-MCNC: 6.8 G/DL (ref 6.3–8.2)
RBC # BLD AUTO: 4.43 M/UL (ref 4.05–5.2)
SODIUM SERPL-SCNC: 137 MMOL/L (ref 133–143)
TRIGL SERPL-MCNC: 132 MG/DL (ref 35–150)
TSH, 3RD GENERATION: 1.15 UIU/ML (ref 0.36–3.74)
VLDLC SERPL CALC-MCNC: 26.4 MG/DL (ref 6–23)
WBC # BLD AUTO: 6.9 K/UL (ref 4.3–11.1)

## 2023-05-10 RX ORDER — FERROUS SULFATE 325(65) MG
325 TABLET ORAL
Qty: 90 TABLET | Refills: 0 | Status: SHIPPED | OUTPATIENT
Start: 2023-05-10 | End: 2023-08-08

## 2023-05-12 RX ORDER — CITALOPRAM 10 MG/1
10 TABLET ORAL DAILY
Qty: 30 TABLET | Refills: 3 | Status: SHIPPED | OUTPATIENT
Start: 2023-05-12

## 2023-08-18 DIAGNOSIS — E78.2 MIXED HYPERLIPIDEMIA: ICD-10-CM

## 2023-08-18 RX ORDER — PRAVASTATIN SODIUM 40 MG
TABLET ORAL
Qty: 90 TABLET | Refills: 0 | OUTPATIENT
Start: 2023-08-18

## 2023-10-04 ENCOUNTER — OFFICE VISIT (OUTPATIENT)
Dept: FAMILY MEDICINE CLINIC | Facility: CLINIC | Age: 83
End: 2023-10-04
Payer: MEDICARE

## 2023-10-04 VITALS
DIASTOLIC BLOOD PRESSURE: 70 MMHG | BODY MASS INDEX: 36.88 KG/M2 | WEIGHT: 216 LBS | SYSTOLIC BLOOD PRESSURE: 124 MMHG | HEIGHT: 64 IN

## 2023-10-04 DIAGNOSIS — D50.9 IRON DEFICIENCY ANEMIA, UNSPECIFIED IRON DEFICIENCY ANEMIA TYPE: ICD-10-CM

## 2023-10-04 DIAGNOSIS — J30.1 SEASONAL ALLERGIC RHINITIS DUE TO POLLEN: ICD-10-CM

## 2023-10-04 DIAGNOSIS — K21.9 GASTROESOPHAGEAL REFLUX DISEASE WITHOUT ESOPHAGITIS: ICD-10-CM

## 2023-10-04 DIAGNOSIS — E03.9 PRIMARY HYPOTHYROIDISM: ICD-10-CM

## 2023-10-04 DIAGNOSIS — E66.01 SEVERE OBESITY (BMI 35.0-39.9) WITH COMORBIDITY (HCC): ICD-10-CM

## 2023-10-04 DIAGNOSIS — E78.00 HYPERCHOLESTEROLEMIA: ICD-10-CM

## 2023-10-04 DIAGNOSIS — H81.13 BENIGN PAROXYSMAL POSITIONAL VERTIGO DUE TO BILATERAL VESTIBULAR DISORDER: Primary | ICD-10-CM

## 2023-10-04 DIAGNOSIS — F41.1 ANXIETY STATE: ICD-10-CM

## 2023-10-04 LAB
BASOPHILS # BLD: 0.1 K/UL (ref 0–0.2)
BASOPHILS NFR BLD: 1 % (ref 0–2)
DIFFERENTIAL METHOD BLD: ABNORMAL
EOSINOPHIL # BLD: 0.2 K/UL (ref 0–0.8)
EOSINOPHIL NFR BLD: 3 % (ref 0.5–7.8)
ERYTHROCYTE [DISTWIDTH] IN BLOOD BY AUTOMATED COUNT: 18.3 % (ref 11.9–14.6)
FERRITIN SERPL-MCNC: 6 NG/ML (ref 8–388)
HCT VFR BLD AUTO: 41.3 % (ref 35.8–46.3)
HGB BLD-MCNC: 12.4 G/DL (ref 11.7–15.4)
IMM GRANULOCYTES # BLD AUTO: 0 K/UL (ref 0–0.5)
IMM GRANULOCYTES NFR BLD AUTO: 0 % (ref 0–5)
IRON SERPL-MCNC: 54 UG/DL (ref 35–150)
LYMPHOCYTES # BLD: 4 K/UL (ref 0.5–4.6)
LYMPHOCYTES NFR BLD: 49 % (ref 13–44)
MCH RBC QN AUTO: 26.2 PG (ref 26.1–32.9)
MCHC RBC AUTO-ENTMCNC: 30 G/DL (ref 31.4–35)
MCV RBC AUTO: 87.3 FL (ref 82–102)
MONOCYTES # BLD: 0.5 K/UL (ref 0.1–1.3)
MONOCYTES NFR BLD: 7 % (ref 4–12)
NEUTS SEG # BLD: 3.2 K/UL (ref 1.7–8.2)
NEUTS SEG NFR BLD: 40 % (ref 43–78)
NRBC # BLD: 0 K/UL (ref 0–0.2)
PLATELET # BLD AUTO: 328 K/UL (ref 150–450)
PMV BLD AUTO: 11 FL (ref 9.4–12.3)
RBC # BLD AUTO: 4.73 M/UL (ref 4.05–5.2)
TIBC SERPL-MCNC: 414 UG/DL (ref 250–450)
WBC # BLD AUTO: 8.1 K/UL (ref 4.3–11.1)

## 2023-10-04 PROCEDURE — 99214 OFFICE O/P EST MOD 30 MIN: CPT | Performed by: FAMILY MEDICINE

## 2023-10-04 PROCEDURE — G8484 FLU IMMUNIZE NO ADMIN: HCPCS | Performed by: FAMILY MEDICINE

## 2023-10-04 PROCEDURE — G8427 DOCREV CUR MEDS BY ELIG CLIN: HCPCS | Performed by: FAMILY MEDICINE

## 2023-10-04 PROCEDURE — 1090F PRES/ABSN URINE INCON ASSESS: CPT | Performed by: FAMILY MEDICINE

## 2023-10-04 PROCEDURE — G8417 CALC BMI ABV UP PARAM F/U: HCPCS | Performed by: FAMILY MEDICINE

## 2023-10-04 PROCEDURE — 1036F TOBACCO NON-USER: CPT | Performed by: FAMILY MEDICINE

## 2023-10-04 PROCEDURE — G8400 PT W/DXA NO RESULTS DOC: HCPCS | Performed by: FAMILY MEDICINE

## 2023-10-04 PROCEDURE — 1123F ACP DISCUSS/DSCN MKR DOCD: CPT | Performed by: FAMILY MEDICINE

## 2023-10-04 RX ORDER — DORZOLAMIDE HYDROCHLORIDE AND TIMOLOL MALEATE 20; 5 MG/ML; MG/ML
SOLUTION/ DROPS OPHTHALMIC
Status: CANCELLED | OUTPATIENT
Start: 2023-10-04

## 2023-10-04 RX ORDER — CETIRIZINE HYDROCHLORIDE 10 MG/1
10 TABLET ORAL DAILY
Qty: 30 TABLET | Refills: 2 | Status: SHIPPED | OUTPATIENT
Start: 2023-10-04 | End: 2023-11-03

## 2023-10-04 RX ORDER — DORZOLAMIDE HYDROCHLORIDE AND TIMOLOL MALEATE 20; 5 MG/ML; MG/ML
SOLUTION/ DROPS OPHTHALMIC
COMMUNITY
Start: 2023-08-15

## 2023-10-04 RX ORDER — MECLIZINE HCL 12.5 MG/1
12.5 TABLET ORAL 3 TIMES DAILY PRN
COMMUNITY
End: 2023-10-04 | Stop reason: SDUPTHER

## 2023-10-04 RX ORDER — MECLIZINE HCL 12.5 MG/1
12.5 TABLET ORAL 3 TIMES DAILY PRN
Qty: 30 TABLET | Refills: 3 | Status: SHIPPED | OUTPATIENT
Start: 2023-10-04

## 2023-10-04 SDOH — ECONOMIC STABILITY: HOUSING INSECURITY
IN THE LAST 12 MONTHS, WAS THERE A TIME WHEN YOU DID NOT HAVE A STEADY PLACE TO SLEEP OR SLEPT IN A SHELTER (INCLUDING NOW)?: NO

## 2023-10-04 SDOH — ECONOMIC STABILITY: FOOD INSECURITY: WITHIN THE PAST 12 MONTHS, YOU WORRIED THAT YOUR FOOD WOULD RUN OUT BEFORE YOU GOT MONEY TO BUY MORE.: NEVER TRUE

## 2023-10-04 SDOH — ECONOMIC STABILITY: FOOD INSECURITY: WITHIN THE PAST 12 MONTHS, THE FOOD YOU BOUGHT JUST DIDN'T LAST AND YOU DIDN'T HAVE MONEY TO GET MORE.: NEVER TRUE

## 2023-10-04 SDOH — ECONOMIC STABILITY: INCOME INSECURITY: HOW HARD IS IT FOR YOU TO PAY FOR THE VERY BASICS LIKE FOOD, HOUSING, MEDICAL CARE, AND HEATING?: NOT HARD AT ALL

## 2023-10-04 ASSESSMENT — ENCOUNTER SYMPTOMS
ABDOMINAL PAIN: 0
DIARRHEA: 0
SORE THROAT: 0
VOMITING: 0
EYE PAIN: 0
WHEEZING: 0
STRIDOR: 0
ABDOMINAL DISTENTION: 0
FACIAL SWELLING: 0
SINUS PAIN: 0
BACK PAIN: 0
COUGH: 0
EYE ITCHING: 0
SINUS PRESSURE: 0
EYE REDNESS: 0
EYE DISCHARGE: 0
NAUSEA: 0
BLOOD IN STOOL: 0
CHEST TIGHTNESS: 0
COLOR CHANGE: 0
SHORTNESS OF BREATH: 0
CONSTIPATION: 0
RHINORRHEA: 0

## 2023-10-04 NOTE — ASSESSMENT & PLAN NOTE
Encouraged to limit carbs, portion sizes, eat 5 small meals daily, use veggies and nuts as snacks, increase exercise to 30 minutes of aerobic exercise 5 times weekly, weigh daily.

## 2023-10-04 NOTE — PROGRESS NOTES
response closely given hx. Relevant Medications    omeprazole (PRILOSEC) 40 MG delayed release capsule    meclizine (ANTIVERT) 12.5 MG tablet       Endocrine    Primary hypothyroidism      TSH at goal per last check. Continue same dose. Relevant Medications    levothyroxine (SYNTHROID) 50 MCG tablet       Nervous and Auditory    Benign paroxysmal positional vertigo due to bilateral vestibular disorder - Primary      Restart meclizine. If no improvement, will need to see PT for vestibular rehab. Relevant Medications    meclizine (ANTIVERT) 12.5 MG tablet       Other    Hypercholesterolemia      LDL at goal on last check. Continue pravastatin. Encouraged to reduce red meat, fried foods, fast foods, butter, mayonnaise and dairy products; increase daily exercise and limit intake of egg yolks to < 7 egg yolks weekly. Relevant Medications    pravastatin (PRAVACHOL) 40 MG tablet    Anxiety state      Appears to be better with celexa, but concerned that her mood may be quite subdued given grand daughters report. Plan to continue this dose for now and monitor her s/s. Consider switching to ssri if she has any significant behavioral changes. Relevant Medications    citalopram (CELEXA) 10 MG tablet    Severe obesity (BMI 35.0-39. 9) with comorbidity ([address])       Encouraged to limit carbs, portion sizes, eat 5 small meals daily, use veggies and nuts as snacks, increase exercise to 30 minutes of aerobic exercise 5 times weekly, weigh daily. Iron deficiency anemia     hgb 9.6 prior to starting supplementation. Will need to recheck today-has been off Fe for approx 1 month.           Relevant Orders    CBC with Auto Differential    Iron    Ferritin    Total Iron Binding Capacity        Adolfo Martino III, MD

## 2023-10-04 NOTE — ASSESSMENT & PLAN NOTE
LDL at goal on last check. Continue pravastatin. Encouraged to reduce red meat, fried foods, fast foods, butter, mayonnaise and dairy products; increase daily exercise and limit intake of egg yolks to < 7 egg yolks weekly.

## 2023-10-04 NOTE — ASSESSMENT & PLAN NOTE
Previous partial gastrectomy per record. States she is doing well on ppi daily rather than BID. Monitor response closely given hx.

## 2023-10-04 NOTE — ASSESSMENT & PLAN NOTE
Appears to be better with celexa, but concerned that her mood may be quite subdued given grand daughters report. Plan to continue this dose for now and monitor her s/s. Consider switching to ssri if she has any significant behavioral changes.

## 2023-10-04 NOTE — ASSESSMENT & PLAN NOTE
hgb 9.6 prior to starting supplementation. Will need to recheck today-has been off Fe for approx 1 month.

## 2023-10-19 ENCOUNTER — OFFICE VISIT (OUTPATIENT)
Dept: FAMILY MEDICINE CLINIC | Facility: CLINIC | Age: 83
End: 2023-10-19
Payer: MEDICARE

## 2023-10-19 VITALS
WEIGHT: 216 LBS | SYSTOLIC BLOOD PRESSURE: 128 MMHG | DIASTOLIC BLOOD PRESSURE: 70 MMHG | BODY MASS INDEX: 36.88 KG/M2 | HEIGHT: 64 IN

## 2023-10-19 DIAGNOSIS — H81.13 BENIGN PAROXYSMAL POSITIONAL VERTIGO DUE TO BILATERAL VESTIBULAR DISORDER: ICD-10-CM

## 2023-10-19 DIAGNOSIS — K21.9 GASTROESOPHAGEAL REFLUX DISEASE WITHOUT ESOPHAGITIS: ICD-10-CM

## 2023-10-19 DIAGNOSIS — F41.1 ANXIETY STATE: ICD-10-CM

## 2023-10-19 DIAGNOSIS — E66.01 SEVERE OBESITY (BMI 35.0-39.9) WITH COMORBIDITY (HCC): ICD-10-CM

## 2023-10-19 DIAGNOSIS — Z00.00 ENCOUNTER FOR ANNUAL WELLNESS EXAM IN MEDICARE PATIENT: Primary | ICD-10-CM

## 2023-10-19 DIAGNOSIS — E03.9 PRIMARY HYPOTHYROIDISM: ICD-10-CM

## 2023-10-19 DIAGNOSIS — G44.209 TENSION HEADACHE: ICD-10-CM

## 2023-10-19 DIAGNOSIS — K21.9 GASTROESOPHAGEAL REFLUX DISEASE, UNSPECIFIED WHETHER ESOPHAGITIS PRESENT: ICD-10-CM

## 2023-10-19 DIAGNOSIS — E78.00 HYPERCHOLESTEROLEMIA: ICD-10-CM

## 2023-10-19 DIAGNOSIS — E78.2 MIXED HYPERLIPIDEMIA: ICD-10-CM

## 2023-10-19 DIAGNOSIS — Z28.39 IMMUNIZATION DEFICIENCY: ICD-10-CM

## 2023-10-19 DIAGNOSIS — Z23 FLU VACCINE NEED: ICD-10-CM

## 2023-10-19 DIAGNOSIS — D50.9 IRON DEFICIENCY ANEMIA, UNSPECIFIED IRON DEFICIENCY ANEMIA TYPE: ICD-10-CM

## 2023-10-19 PROCEDURE — G8427 DOCREV CUR MEDS BY ELIG CLIN: HCPCS | Performed by: FAMILY MEDICINE

## 2023-10-19 PROCEDURE — G8417 CALC BMI ABV UP PARAM F/U: HCPCS | Performed by: FAMILY MEDICINE

## 2023-10-19 PROCEDURE — 1123F ACP DISCUSS/DSCN MKR DOCD: CPT | Performed by: FAMILY MEDICINE

## 2023-10-19 PROCEDURE — G8482 FLU IMMUNIZE ORDER/ADMIN: HCPCS | Performed by: FAMILY MEDICINE

## 2023-10-19 PROCEDURE — G8400 PT W/DXA NO RESULTS DOC: HCPCS | Performed by: FAMILY MEDICINE

## 2023-10-19 PROCEDURE — 90674 CCIIV4 VAC NO PRSV 0.5 ML IM: CPT | Performed by: FAMILY MEDICINE

## 2023-10-19 PROCEDURE — G0008 ADMIN INFLUENZA VIRUS VAC: HCPCS | Performed by: FAMILY MEDICINE

## 2023-10-19 PROCEDURE — 1090F PRES/ABSN URINE INCON ASSESS: CPT | Performed by: FAMILY MEDICINE

## 2023-10-19 PROCEDURE — G0439 PPPS, SUBSEQ VISIT: HCPCS | Performed by: FAMILY MEDICINE

## 2023-10-19 PROCEDURE — 99214 OFFICE O/P EST MOD 30 MIN: CPT | Performed by: FAMILY MEDICINE

## 2023-10-19 PROCEDURE — 1036F TOBACCO NON-USER: CPT | Performed by: FAMILY MEDICINE

## 2023-10-19 RX ORDER — MECLIZINE HCL 12.5 MG/1
12.5 TABLET ORAL 3 TIMES DAILY PRN
Qty: 30 TABLET | Refills: 3 | Status: SHIPPED | OUTPATIENT
Start: 2023-10-19

## 2023-10-19 RX ORDER — PRAVASTATIN SODIUM 40 MG
40 TABLET ORAL DAILY
Qty: 90 TABLET | Refills: 3 | Status: SHIPPED | OUTPATIENT
Start: 2023-10-19

## 2023-10-19 RX ORDER — OMEPRAZOLE 40 MG/1
40 CAPSULE, DELAYED RELEASE ORAL DAILY
Qty: 90 CAPSULE | Refills: 3 | Status: SHIPPED | OUTPATIENT
Start: 2023-10-19

## 2023-10-19 RX ORDER — ZOSTER VACCINE RECOMBINANT, ADJUVANTED 50 MCG/0.5
0.5 KIT INTRAMUSCULAR SEE ADMIN INSTRUCTIONS
Qty: 0.5 ML | Refills: 0 | Status: SHIPPED | OUTPATIENT
Start: 2023-10-19 | End: 2024-04-16

## 2023-10-19 RX ORDER — FERROUS SULFATE 325(65) MG
325 TABLET ORAL
Qty: 90 TABLET | Refills: 0 | Status: SHIPPED | OUTPATIENT
Start: 2023-10-19

## 2023-10-19 RX ORDER — FERROUS SULFATE 325(65) MG
325 TABLET ORAL
Qty: 90 TABLET | Refills: 3 | Status: SHIPPED | OUTPATIENT
Start: 2023-10-19 | End: 2023-10-19 | Stop reason: SDUPTHER

## 2023-10-19 ASSESSMENT — ENCOUNTER SYMPTOMS
EYE PAIN: 0
EYE DISCHARGE: 0
EYE REDNESS: 0
EYE ITCHING: 0
SORE THROAT: 0
DIARRHEA: 0
BLOOD IN STOOL: 0
SINUS PAIN: 0
VOMITING: 0
WHEEZING: 0
COUGH: 0
SHORTNESS OF BREATH: 0
ABDOMINAL PAIN: 0
STRIDOR: 0
FACIAL SWELLING: 0
BACK PAIN: 0
RHINORRHEA: 0
ABDOMINAL DISTENTION: 0
SINUS PRESSURE: 0
COLOR CHANGE: 0
NAUSEA: 0
CHEST TIGHTNESS: 0
CONSTIPATION: 0

## 2023-10-19 ASSESSMENT — LIFESTYLE VARIABLES
HOW OFTEN DO YOU HAVE A DRINK CONTAINING ALCOHOL: NEVER
HOW MANY STANDARD DRINKS CONTAINING ALCOHOL DO YOU HAVE ON A TYPICAL DAY: PATIENT DOES NOT DRINK

## 2023-10-19 NOTE — ASSESSMENT & PLAN NOTE
LDL at goal. Encouraged to reduce red meat, fried foods, fast foods, butter, mayonnaise and dairy products; increase daily exercise and limit intake of egg yolks to < 7 egg yolks weekly.

## 2023-10-19 NOTE — ASSESSMENT & PLAN NOTE
Encouraged 5 servings of fruits and veggies daily. Instructed to drink approx 2 L of fluid daily, mostly water. Recommended 30 sustained minutes of aerobic exercise daily (e.g. cycling, rowing, jogging). Limit high calorie processed foods, red meat, egg yolks <1 daily, dairy products, butter, castellano, fried and fast foods. Immunizations:  -Influenza: Recommended annually- completed today  -Covid- Advised on CDC recommendations. Booster recommended  -PNA- Discussed at risk populations, s/e vs benefits. UTD  -Shingles- Counseled on shingles vaccine-pt agrees to Shingrix. Tdap-  UTD    Screenings:  -mammogram- wishes no additional screenings.

## 2023-10-19 NOTE — ASSESSMENT & PLAN NOTE
Educated on lifestyle modifications with instructions to reduce large meals, particularly before bedtime; reduce spicy foods, caffeine, alcohol and acidic foods/drinks, avoid smoking. continue ppi.

## 2023-10-19 NOTE — PROGRESS NOTES
Pupils are equal, round, and reactive to light. Cardiovascular:      Rate and Rhythm: Normal rate and regular rhythm. Pulses: Normal pulses. Heart sounds: Normal heart sounds. No murmur heard. No friction rub. No gallop. Pulmonary:      Effort: Pulmonary effort is normal. No respiratory distress. Breath sounds: Normal breath sounds. No stridor. No wheezing, rhonchi or rales. Chest:      Chest wall: No tenderness. Abdominal:      General: Abdomen is flat. Bowel sounds are normal. There is no distension. Palpations: Abdomen is soft. There is no mass. Tenderness: There is no abdominal tenderness. There is no right CVA tenderness, left CVA tenderness, guarding or rebound. Musculoskeletal:         General: No swelling, tenderness, deformity or signs of injury. Cervical back: Neck supple. No rigidity or tenderness. Right lower leg: No edema. Left lower leg: No edema. Lymphadenopathy:      Cervical: No cervical adenopathy. Skin:     General: Skin is warm and dry. Coloration: Skin is not jaundiced or pale. Findings: No bruising, erythema, lesion or rash. Neurological:      General: No focal deficit present. Mental Status: She is alert. Mental status is at baseline. Motor: No weakness. Coordination: Coordination normal.      Gait: Gait normal.   Psychiatric:         Mood and Affect: Mood normal.         Behavior: Behavior normal.         Thought Content: Thought content normal.         Judgment: Judgment normal.         Assessment/Plan:     ICD-10-CM    1. Encounter for annual wellness exam in Medicare patient  Z00.00       2. Benign paroxysmal positional vertigo due to bilateral vestibular disorder  H81.13 [facility], [facility]     meclizine (ANTIVERT) 12.5 MG tablet      3.  Iron deficiency anemia, unspecified iron deficiency anemia type  D50.9 ferrous sulfate (IRON 325) 325 (65 Fe) MG tablet

## 2023-10-19 NOTE — ASSESSMENT & PLAN NOTE
Encouraged to limit carbs, portion sizes, eat 5 small meals daily, use veggies and nuts as snacks, weigh daily.

## 2023-10-24 DIAGNOSIS — K21.9 GASTROESOPHAGEAL REFLUX DISEASE, UNSPECIFIED WHETHER ESOPHAGITIS PRESENT: ICD-10-CM

## 2023-10-24 RX ORDER — OMEPRAZOLE 40 MG/1
40 CAPSULE, DELAYED RELEASE ORAL DAILY
Qty: 90 CAPSULE | Refills: 3 | Status: SHIPPED | OUTPATIENT
Start: 2023-10-24

## 2023-10-24 NOTE — TELEPHONE ENCOUNTER
Please send this to [address] with correct sig. The previous RX had conflicting sig.  Pt confirmed she is only taking this qd

## 2023-10-26 ENCOUNTER — HOSPITAL ENCOUNTER (OUTPATIENT)
Dept: PHYSICAL THERAPY | Age: 83
Setting detail: RECURRING SERIES
Discharge: HOME | End: 2023-10-29
Attending: FAMILY MEDICINE
Payer: MEDICARE

## 2023-10-26 DIAGNOSIS — R42 DIZZINESS: Primary | ICD-10-CM

## 2023-10-26 DIAGNOSIS — R42 VERTIGO: ICD-10-CM

## 2023-10-26 DIAGNOSIS — R26.2 DIFFICULTY IN WALKING, NOT ELSEWHERE CLASSIFIED: ICD-10-CM

## 2023-10-26 PROCEDURE — 97163 PT EVAL HIGH COMPLEX 45 MIN: CPT

## 2023-10-26 NOTE — THERAPY EVALUATION
demonstrate progress in balance and dizziness to improve safety during activities of daily living. Reason For Services/Other Comments:  > Patient continues to require skilled intervention due to increased higher fall risk with daily activities. Total Duration:  Time In: 1345  Time Out: 1430    Regarding Cm Jacobsen's therapy, I certify that the treatment plan above will be carried out by a therapist or under their direction.   Thank you for this referral,  Nevin Wolff, PT     Referring Physician Signature: Kenya Diaz, * _______________________________ Date _____________        Post Session Pain  Charge Capture  PT Visit Info MD Guidelines  MyChart

## 2023-10-27 ASSESSMENT — PAIN SCALES - GENERAL: PAINLEVEL_OUTOF10: 0

## 2023-12-06 ENCOUNTER — OFFICE VISIT (OUTPATIENT)
Dept: FAMILY MEDICINE CLINIC | Facility: CLINIC | Age: 83
End: 2023-12-06
Payer: MEDICARE

## 2023-12-06 VITALS — BODY MASS INDEX: 37.76 KG/M2 | WEIGHT: 220 LBS

## 2023-12-06 DIAGNOSIS — R19.00 RETROPERITONEAL MASS: ICD-10-CM

## 2023-12-06 DIAGNOSIS — R42 DIZZINESS: Primary | ICD-10-CM

## 2023-12-06 DIAGNOSIS — E78.00 HYPERCHOLESTEROLEMIA: ICD-10-CM

## 2023-12-06 DIAGNOSIS — R03.0 ELEVATED BLOOD PRESSURE READING: ICD-10-CM

## 2023-12-06 DIAGNOSIS — R73.03 PRE-DIABETES: ICD-10-CM

## 2023-12-06 DIAGNOSIS — F41.1 ANXIETY STATE: ICD-10-CM

## 2023-12-06 DIAGNOSIS — F32.A DEPRESSION, UNSPECIFIED DEPRESSION TYPE: ICD-10-CM

## 2023-12-06 DIAGNOSIS — H81.13 BENIGN PAROXYSMAL POSITIONAL VERTIGO DUE TO BILATERAL VESTIBULAR DISORDER: ICD-10-CM

## 2023-12-06 PROCEDURE — G8400 PT W/DXA NO RESULTS DOC: HCPCS | Performed by: FAMILY MEDICINE

## 2023-12-06 PROCEDURE — G8482 FLU IMMUNIZE ORDER/ADMIN: HCPCS | Performed by: FAMILY MEDICINE

## 2023-12-06 PROCEDURE — G8427 DOCREV CUR MEDS BY ELIG CLIN: HCPCS | Performed by: FAMILY MEDICINE

## 2023-12-06 PROCEDURE — 99214 OFFICE O/P EST MOD 30 MIN: CPT | Performed by: FAMILY MEDICINE

## 2023-12-06 PROCEDURE — 1090F PRES/ABSN URINE INCON ASSESS: CPT | Performed by: FAMILY MEDICINE

## 2023-12-06 PROCEDURE — G8417 CALC BMI ABV UP PARAM F/U: HCPCS | Performed by: FAMILY MEDICINE

## 2023-12-06 PROCEDURE — 1123F ACP DISCUSS/DSCN MKR DOCD: CPT | Performed by: FAMILY MEDICINE

## 2023-12-06 PROCEDURE — 1036F TOBACCO NON-USER: CPT | Performed by: FAMILY MEDICINE

## 2023-12-06 RX ORDER — MECLIZINE HCL 12.5 MG/1
12.5 TABLET ORAL 3 TIMES DAILY PRN
Qty: 90 TABLET | Refills: 3 | Status: SHIPPED | OUTPATIENT
Start: 2023-12-06

## 2023-12-06 RX ORDER — MECLIZINE HCL 12.5 MG/1
12.5 TABLET ORAL 3 TIMES DAILY PRN
Qty: 30 TABLET | Refills: 3 | Status: SHIPPED | OUTPATIENT
Start: 2023-12-06 | End: 2023-12-06 | Stop reason: SDUPTHER

## 2023-12-06 ASSESSMENT — ENCOUNTER SYMPTOMS
COUGH: 0
VOMITING: 0
ABDOMINAL PAIN: 0
NAUSEA: 0
DIARRHEA: 0
SORE THROAT: 0
RHINORRHEA: 0
EYE REDNESS: 0
EYE ITCHING: 0
EYE DISCHARGE: 0

## 2023-12-06 NOTE — PROGRESS NOTES
[address] [place] [address] Blvd  _______________________________________  Junious Parents, MD                 [address] [place], [address] [place] [address], [address] [place] [address]                                                                                    Phone: [phone]                                                                                    Fax: [phone]    Yue Mccollum is a 80 y.o. female who is seen for evaluation of   Chief Complaint   Patient presents with    Dizziness     Followup on dizziness. PT did not give any relief    Stomach Mass     Pt in room with daughter, wanting to discuss something, a mass like finding that was found in patients stomach from several years ago while admitted at [address] [facility]        HPI:   Mrs. Jackelyn Hayward is am 79 y/o F with h/o glaucoma, BPV, anxiety, HLD, GERD, primary hypothyroidism, FESTUS, here for f/u. Accompanied by her daughter. -     -BPV- initially stated that her s/s have not improved and PT told her there was nothing more they could offer. Later indicated that s/s antivert significantly improved her s/s.     - c/o facial rash - granddaughter states she has been dx'd with eczema. Pt has a bottle of clindamycin the she said was sent to her to tx.     - FESTUS- on iron since 5/2023. Hgb now wnl. She stopped iron supplement last month. - GERD- on prilosec daily. Previously on bid, but had brittle nails. - MDD/anxiety- pt has restarted her celexa. She has some concerns that the medication has negative spiritual implications. She has since stopped the medication indicating that it \"made her feel weird\" and felt it caused dizziness. Her dizziness has persisted despite stopping medication.     - HLD- LDL 97. Tolerates pravastatin.      - primary hypothyroidism- TSH wnl.    - glaucoma- pt requests that I assume rx'ing      Eye Problem   Pertinent negatives include no eye discharge, eye redness, fever, itching, nausea, vomiting or

## 2023-12-07 PROBLEM — R73.03 PRE-DIABETES: Status: ACTIVE | Noted: 2023-12-07

## 2023-12-07 PROBLEM — R03.0 ELEVATED BLOOD PRESSURE READING: Status: ACTIVE | Noted: 2023-12-07

## 2023-12-07 PROBLEM — F32.A DEPRESSION: Status: ACTIVE | Noted: 2023-12-07

## 2023-12-07 PROBLEM — R42 DIZZINESS: Status: ACTIVE | Noted: 2023-12-07

## 2023-12-07 ASSESSMENT — ENCOUNTER SYMPTOMS
SINUS PAIN: 0
BACK PAIN: 0
FACIAL SWELLING: 0
WHEEZING: 0
SINUS PRESSURE: 0
STRIDOR: 0
CONSTIPATION: 0
CHEST TIGHTNESS: 0
SHORTNESS OF BREATH: 0
ABDOMINAL DISTENTION: 0
EYE PAIN: 0
COLOR CHANGE: 0
BLOOD IN STOOL: 0

## 2023-12-07 NOTE — ASSESSMENT & PLAN NOTE
Pt indicates that she stopped \"that medication\" but cannot give specific medication name. Given context, suspect she has stopped the celexa. Will plan to monitor her s/s for now.

## 2023-12-07 NOTE — ASSESSMENT & PLAN NOTE
Cts reviewed. Last was in 2018. No mention of need for continued surveillance. No evidence of worsening pain. At this point, will delay any further surveillance. The pt has little recollection of specifics of findings. Concerned that her hear deficiency that she refuses to wear hearing aids for is likely the cause.

## 2023-12-07 NOTE — ASSESSMENT & PLAN NOTE
Antivert clearly working for abortive. Does not seem to have benefited from PT. Will plan to continue scheduled antivert for now.

## 2023-12-12 NOTE — DISCHARGE INSTRUCTIONS
Empty the STELLA drain as needed. Replace the dry gauze around the drain exit site as needed. Cover the midline incision with dry gauze and tape daily  Keep incision and drain exit site dry to lower risk of infection  No heavy lifting (>5lbs) for 6 weeks to reduce risk of developing a hernia in the incisions. Pain prescription (Percocet) on chart for patient to use as needed for pain  Antibiotic Prescriptions (Cipro and Flagyl) on chart for outpt use also  Follow-up with Dr Charles Zhong in 8 days on a Thursday in the office at:  [address], [address]  (Call for an appt time-->[phone]-->option 2)        DISCHARGE SUMMARY from Nurse    The following personal items are in your possession at time of discharge:    Dental Appliances: None  Visual Aid: Glasses     Home Medications: None  Jewelry: None  Clothing: None  Other Valuables: None             PATIENT INSTRUCTIONS:    After general anesthesia or intravenous sedation, for 24 hours or while taking prescription Narcotics:  · Limit your activities  · Do not drive and operate hazardous machinery  · Do not make important personal or business decisions  · Do  not drink alcoholic beverages  · If you have not urinated within 8 hours after discharge, please contact your surgeon on call.     Report the following to your surgeon:  · Excessive pain, swelling, redness or odor of or around the surgical area  · Temperature over 100.5  · Nausea and vomiting lasting longer than 4 hours or if unable to take medications  · Any signs of decreased circulation or nerve impairment to extremity: change in color, persistent  numbness, tingling, coldness or increase pain  · Any questions        What to do at Home:  Recommended activity: Activity as tolerated, per MD instructions    If you experience any of the following symptoms fever > 100.5, nausea, vomiting, pain, chest pain, shortness of breath please follow up with MD.      *  Please give a list of your current medications to your Primary Care Provider. *  Please update this list whenever your medications are discontinued, doses are      changed, or new medications (including over-the-counter products) are added. *  Please carry medication information at all times in case of emergency situations. These are general instructions for a healthy lifestyle:    No smoking/ No tobacco products/ Avoid exposure to second hand smoke    Surgeon General's Warning:  Quitting smoking now greatly reduces serious risk to your health. Obesity, smoking, and sedentary lifestyle greatly increases your risk for illness    A healthy diet, regular physical exercise & weight monitoring are important for maintaining a healthy lifestyle    You may be retaining fluid if you have a history of heart failure or if you experience any of the following symptoms:  Weight gain of 3 pounds or more overnight or 5 pounds in a week, increased swelling in our hands or feet or shortness of breath while lying flat in bed. Please call your doctor as soon as you notice any of these symptoms; do not wait until your next office visit. Recognize signs and symptoms of STROKE:    F-face looks uneven    A-arms unable to move or move unevenly    S-speech slurred or non-existent    T-time-call 911 as soon as signs and symptoms begin-DO NOT go       Back to bed or wait to see if you get better-TIME IS BRAIN. Warning Signs of HEART ATTACK     Call 911 if you have these symptoms:   Chest discomfort. Most heart attacks involve discomfort in the center of the chest that lasts more than a few minutes, or that goes away and comes back. It can feel like uncomfortable pressure, squeezing, fullness, or pain.  Discomfort in other areas of the upper body. Symptoms can include pain or discomfort in one or both arms, the back, neck, jaw, or stomach.  Shortness of breath with or without chest discomfort.    Other signs may include breaking out in a cold sweat, nausea, or lightheadedness. Don't wait more than five minutes to call 911 - MINUTES MATTER! Fast action can save your life. Calling 911 is almost always the fastest way to get lifesaving treatment. Emergency Medical Services staff can begin treatment when they arrive -- up to an hour sooner than if someone gets to the hospital by car. The discharge information has been reviewed with the patient. The patient verbalized understanding. Discharge medications reviewed with the patient and appropriate educational materials and side effects teaching were provided. Surgery: What to Expect at Home  Your Recovery  This care sheet gives you a general idea about how long it will take for you to recover from your surgery. But each person recovers at a different pace. How can you care for yourself at home? Activity  · Allow your body to heal. Don't move quickly or lift anything heavy until you are feeling better. · Rest when you feel tired. · Your doctor may give you specific instructions on when you can do your normal activities again, such as driving and going back to work. · Be active. Walking is a good choice. Diet  · You can eat your normal diet when you feel well. If your stomach is upset, try bland, low-fat foods like plain rice, broiled chicken, toast, and yogurt. · If your bowel movements are not regular right after surgery, try to avoid constipation and straining. Drink plenty of water. Your doctor may suggest fiber, a stool softener, or a mild laxative. Medicines  · Your doctor will tell you if and when you can restart your medicines. He or she will also give you instructions about taking any new medicines. · If you take blood thinners, such as warfarin (Coumadin), clopidogrel (Plavix), or aspirin, be sure to talk to your doctor. He or she will tell you if and when to start taking those medicines again. Make sure that you understand exactly what your doctor wants you to do.   · Be safe with medicines. Read and follow all instructions on the label. ¨ If the doctor gave you a prescription medicine for pain, take it as prescribed. ¨ If you are not taking a prescription pain medicine, ask your doctor if you can take an over-the-counter medicine. Incision care  If your doctor told you how to care for your cut (incision), follow your doctor's instructions. If you did not get instructions, follow this general advice:  · You will have a dressing over the cut. A dressing helps the incision heal and protects it. Your doctor will tell you how to take care of this. · If you have strips of tape on the cut the doctor made, leave the tape on for a week or until it falls off. · If you had stitches, your doctor will tell you when to come back to have them removed. · If you have skin adhesive on the cut, leave it on until it falls off. Skin adhesive is also called liquid stitches. · Change the bandage every day. · Wash the area daily with warm water, and pat it dry. Don't use hydrogen peroxide or alcohol. They can slow healing. · You may cover the area with a gauze bandage if it oozes fluid or rubs against clothing. · You may shower 24 to 48 hours after surgery. Pat the incision dry. Don't swim or take a bath for the first 2 weeks, or until your doctor tells you it is okay. Follow-up care is a key part of your treatment and safety. Be sure to make and go to all appointments, and call your doctor if you are having problems. It's also a good idea to know your test results and keep a list of the medicines you take. When should you call for help? Call 911 anytime you think you may need emergency care. For example, call if:  · You passed out (lost consciousness). · You are short of breath. Call your doctor now or seek immediate medical care if:  · You have pain that does not get better after you take pain medicine. · You cannot pass stool or gas.   · You are sick to your stomach and cannot drink fluids. · You have loose stitches, or your incision comes open. · You have signs of a blood clot in your leg (called a deep vein thrombosis), such as:  ¨ Pain in your calf, back of the knee, thigh, or groin. ¨ Redness and swelling in your leg or groin. · You have signs of infection, such as:  ¨ Increased pain, swelling, warmth, or redness. ¨ Red streaks leading from the incision. ¨ Pus draining from the incision. ¨ A fever. · Bright red blood has soaked through your bandage. Watch closely for any changes in your health, and be sure to contact your doctor if you have any problems. Where can you learn more? Go to http://dharmesh-lisbeth.info/. Enter F817 in the search box to learn more about \"Surgery: What to Expect at Home. \"  Current as of: March 24, 2017  Content Version: 11.3  © 1738-4194 [employer]. Care instructions adapted under license by [employer] (which disclaims liability or warranty for this information). If you have questions about a medical condition or this instruction, always ask your healthcare professional. [place] [address] any warranty or liability for your use of this information. Warm

## 2023-12-14 ENCOUNTER — HOSPITAL ENCOUNTER (OUTPATIENT)
Dept: ULTRASOUND IMAGING | Age: 83
Discharge: HOME | End: 2023-12-16
Payer: MEDICARE

## 2023-12-14 DIAGNOSIS — R42 DIZZINESS: ICD-10-CM

## 2023-12-14 PROCEDURE — 93880 EXTRACRANIAL BILAT STUDY: CPT

## 2023-12-14 PROCEDURE — 93880 EXTRACRANIAL BILAT STUDY: CPT | Performed by: RADIOLOGY

## 2023-12-26 ENCOUNTER — HOSPITAL ENCOUNTER (OUTPATIENT)
Age: 83
Discharge: HOME | End: 2023-12-28
Payer: MEDICARE

## 2023-12-26 DIAGNOSIS — R42 DIZZINESS: ICD-10-CM

## 2023-12-26 DIAGNOSIS — R20.2 FACIAL TINGLING: ICD-10-CM

## 2023-12-26 DIAGNOSIS — R27.0 ATAXIA: ICD-10-CM

## 2023-12-26 PROCEDURE — 70551 MRI BRAIN STEM W/O DYE: CPT

## 2024-01-04 ENCOUNTER — TELEPHONE (OUTPATIENT)
Dept: FAMILY MEDICINE CLINIC | Facility: CLINIC | Age: 84
End: 2024-01-04

## 2024-01-04 NOTE — TELEPHONE ENCOUNTER
Msg from [facility] for a refill on a medication that was sent to [facility] on 10-19-23 for a YEAR SUPPLY. No need to refill.         Steffi Roberson [facility] Medicine Clinical Staff  Subject: Refill Request    QUESTIONS  Name of Medication? pravastatin (PRAVACHOL) 40 MG tablet  Patient-reported dosage and instructions? 1 x a day  How many days do you have left? 8  Preferred Pharmacy? [facility]  Pharmacy phone number (if available)? [phone]  Additional Information for Provider? Pt is saying she wants the office to  auto renew her medication. Pt said she is haying the doctor to do this. Pt  said her pharmacy was having a hard time getting her meds refilled and  said they couldn't reach the office. Please reach out to her if any  further questions.  ---------------------------------------------------------------------------  --------------  CALL BACK INFO  What is the best way for the office to contact you? OK to leave message on  voicemail  Preferred Call Back Phone Number? [phone]  ---------------------------------------------------------------------------  --------------  SCRIPT ANSWERS  Relationship to Patient? Self

## 2024-01-04 NOTE — TELEPHONE ENCOUNTER
Pt called in today wanting the MRI results from 1-3-24. It was just finalized  today, 1-4-24. The notes from the [facility] were transferred onto the MRI, that is an obvious error.     Please review report when you return. Pt was notified that she will not hear back about it until  next week when you are back.      Last visit 12-6-23  Next visit NONE (not sure when you want to see her again?)

## 2024-01-08 NOTE — TELEPHONE ENCOUNTER
Is this patient a high probability for COVID-19?: Yes   Diagnosis: SOB (shortness of breath) [538236]   Future Attending Provider: VINCENT VILLEDA IV [[id number]]   Admitting Provider:: VINCENT VILLEDA IV [[id number]]   Left detailed message at shamika Gabrooks  with results.

## 2024-02-01 DIAGNOSIS — R42 DIZZINESS: ICD-10-CM

## 2024-02-01 DIAGNOSIS — K21.9 GASTROESOPHAGEAL REFLUX DISEASE, UNSPECIFIED WHETHER ESOPHAGITIS PRESENT: ICD-10-CM

## 2024-02-01 DIAGNOSIS — E03.9 ACQUIRED HYPOTHYROIDISM: ICD-10-CM

## 2024-02-01 DIAGNOSIS — H81.13 BENIGN PAROXYSMAL POSITIONAL VERTIGO DUE TO BILATERAL VESTIBULAR DISORDER: ICD-10-CM

## 2024-02-01 DIAGNOSIS — E78.2 MIXED HYPERLIPIDEMIA: ICD-10-CM

## 2024-02-01 RX ORDER — LEVOTHYROXINE SODIUM 0.05 MG/1
50 TABLET ORAL
Qty: 90 TABLET | Refills: 0 | Status: SHIPPED | OUTPATIENT
Start: 2024-02-01 | End: 2024-05-01

## 2024-02-01 RX ORDER — OMEPRAZOLE 40 MG/1
40 CAPSULE, DELAYED RELEASE ORAL DAILY
Qty: 90 CAPSULE | Refills: 0 | Status: SHIPPED | OUTPATIENT
Start: 2024-02-01 | End: 2024-05-01

## 2024-02-01 RX ORDER — PRAVASTATIN SODIUM 40 MG
40 TABLET ORAL DAILY
Qty: 90 TABLET | Refills: 0 | Status: SHIPPED | OUTPATIENT
Start: 2024-02-01 | End: 2024-05-01

## 2024-02-01 NOTE — TELEPHONE ENCOUNTER
----- Message from Tuyet Chanel sent at 2/1/2024 10:42 AM EST -----  Subject: Refill Request    QUESTIONS  Name of Medication? levothyroxine (SYNTHROID) 50 MCG tablet  Patient-reported dosage and instructions? 50mg Tablet. Take one Tablet by   mouth QD  How many days do you have left? 50  Preferred Pharmacy? [facility] #[id number]  Pharmacy phone number (if available)? [phone]  Additional Information for Provider? Dr. Vega Mrs. Jacobsen is asking for   a 90days supply through [facility]. Just this time only. The mail order   pharmacy has not sent out her medications and she has been out of the   cholorestrol medication for some time. Please call her when these meds are   sent to [facility]. This TIME ONLY.   ---------------------------------------------------------------------------  --------------,  Name of Medication? omeprazole (PRILOSEC) 40 MG delayed release capsule  Patient-reported dosage and instructions? 40MG delayed release capsule.   Take one capsule in the am and second capsule at night   How many days do you have left? 3  Preferred Pharmacy? [facility] #[id number]  Pharmacy phone number (if available)? [phone]  ---------------------------------------------------------------------------  --------------,  Name of Medication? pravastatin (PRAVACHOL) 40 MG tablet  Patient-reported dosage and instructions? 40mg tablet. Take one tablet by   mouth QD every day   How many days do you have left? 2  Preferred Pharmacy? [facility] #[id number]  Pharmacy phone number (if available)? [phone]  ---------------------------------------------------------------------------  --------------  CALL BACK INFO  What is the best way for the office to contact you? OK to leave message on   voicemail  Preferred Call Back Phone Number? [phone]  ---------------------------------------------------------------------------  --------------  SCRIPT ANSWERS  Relationship to Patient? Self

## 2024-04-24 DIAGNOSIS — E78.2 MIXED HYPERLIPIDEMIA: ICD-10-CM

## 2024-04-24 DIAGNOSIS — K21.9 GASTROESOPHAGEAL REFLUX DISEASE, UNSPECIFIED WHETHER ESOPHAGITIS PRESENT: ICD-10-CM

## 2024-04-24 DIAGNOSIS — E03.9 ACQUIRED HYPOTHYROIDISM: ICD-10-CM

## 2024-04-24 RX ORDER — OMEPRAZOLE 40 MG/1
40 CAPSULE, DELAYED RELEASE ORAL DAILY
Qty: 30 CAPSULE | Refills: 0 | Status: SHIPPED | OUTPATIENT
Start: 2024-04-24 | End: 2024-05-24

## 2024-04-24 RX ORDER — LEVOTHYROXINE SODIUM 0.05 MG/1
50 TABLET ORAL
Qty: 30 TABLET | Refills: 0 | Status: SHIPPED | OUTPATIENT
Start: 2024-04-24 | End: 2024-05-24

## 2024-04-24 RX ORDER — PRAVASTATIN SODIUM 40 MG
40 TABLET ORAL DAILY
Qty: 30 TABLET | Refills: 0 | Status: SHIPPED | OUTPATIENT
Start: 2024-04-24 | End: 2024-05-24

## 2024-04-24 NOTE — TELEPHONE ENCOUNTER
Pt needs appt and will call back to schedule f/u    Told her we would give enough meds to give her time to schedule appt.

## 2024-04-25 RX ORDER — OMEPRAZOLE 40 MG/1
40 CAPSULE, DELAYED RELEASE ORAL DAILY
Qty: 90 CAPSULE | OUTPATIENT
Start: 2024-04-25 | End: 2024-05-25

## 2024-04-25 RX ORDER — PRAVASTATIN SODIUM 40 MG
40 TABLET ORAL DAILY
Qty: 90 TABLET | OUTPATIENT
Start: 2024-04-25 | End: 2024-05-25

## 2024-05-16 ENCOUNTER — OFFICE VISIT (OUTPATIENT)
Dept: FAMILY MEDICINE CLINIC | Facility: CLINIC | Age: 84
End: 2024-05-16
Payer: MEDICARE

## 2024-05-16 VITALS
WEIGHT: 208 LBS | BODY MASS INDEX: 35.51 KG/M2 | HEIGHT: 64 IN | SYSTOLIC BLOOD PRESSURE: 126 MMHG | DIASTOLIC BLOOD PRESSURE: 60 MMHG

## 2024-05-16 DIAGNOSIS — E03.9 PRIMARY HYPOTHYROIDISM: ICD-10-CM

## 2024-05-16 DIAGNOSIS — E78.2 MIXED HYPERLIPIDEMIA: ICD-10-CM

## 2024-05-16 DIAGNOSIS — D50.9 IRON DEFICIENCY ANEMIA, UNSPECIFIED IRON DEFICIENCY ANEMIA TYPE: Primary | ICD-10-CM

## 2024-05-16 DIAGNOSIS — R03.0 ELEVATED BLOOD PRESSURE READING: ICD-10-CM

## 2024-05-16 DIAGNOSIS — E66.01 SEVERE OBESITY (BMI 35.0-39.9) WITH COMORBIDITY (HCC): ICD-10-CM

## 2024-05-16 DIAGNOSIS — R73.03 PRE-DIABETES: ICD-10-CM

## 2024-05-16 DIAGNOSIS — K21.9 GASTROESOPHAGEAL REFLUX DISEASE, UNSPECIFIED WHETHER ESOPHAGITIS PRESENT: ICD-10-CM

## 2024-05-16 DIAGNOSIS — H81.13 BENIGN PAROXYSMAL POSITIONAL VERTIGO DUE TO BILATERAL VESTIBULAR DISORDER: ICD-10-CM

## 2024-05-16 LAB
ALBUMIN SERPL-MCNC: 3.9 G/DL (ref 3.2–4.6)
ALBUMIN/GLOB SERPL: 1.4 (ref 1–1.9)
ALP SERPL-CCNC: 60 U/L (ref 35–104)
ALT SERPL-CCNC: 11 U/L (ref 12–65)
ANION GAP SERPL CALC-SCNC: 8 MMOL/L (ref 9–18)
AST SERPL-CCNC: 17 U/L (ref 15–37)
BASOPHILS # BLD: 0.1 K/UL (ref 0–0.2)
BASOPHILS NFR BLD: 1 % (ref 0–2)
BILIRUB SERPL-MCNC: 0.3 MG/DL (ref 0–1.2)
BUN SERPL-MCNC: 8 MG/DL (ref 8–23)
CALCIUM SERPL-MCNC: 9.9 MG/DL (ref 8.8–10.2)
CHLORIDE SERPL-SCNC: 104 MMOL/L (ref 98–107)
CHOLEST SERPL-MCNC: 187 MG/DL (ref 0–200)
CO2 SERPL-SCNC: 27 MMOL/L (ref 20–28)
CREAT SERPL-MCNC: 0.68 MG/DL (ref 0.6–1.1)
DIFFERENTIAL METHOD BLD: ABNORMAL
EOSINOPHIL # BLD: 0.1 K/UL (ref 0–0.8)
EOSINOPHIL NFR BLD: 2 % (ref 0.5–7.8)
ERYTHROCYTE [DISTWIDTH] IN BLOOD BY AUTOMATED COUNT: 15.2 % (ref 11.9–14.6)
EST. AVERAGE GLUCOSE BLD GHB EST-MCNC: 118 MG/DL
GLOBULIN SER CALC-MCNC: 2.8 G/DL (ref 2.3–3.5)
GLUCOSE SERPL-MCNC: 105 MG/DL (ref 70–99)
HBA1C MFR BLD: 5.7 % (ref 0–5.6)
HCT VFR BLD AUTO: 42.4 % (ref 35.8–46.3)
HDLC SERPL-MCNC: 43 MG/DL (ref 40–60)
HDLC SERPL: 4.3 (ref 0–5)
HGB BLD-MCNC: 12.9 G/DL (ref 11.7–15.4)
IMM GRANULOCYTES # BLD AUTO: 0 K/UL (ref 0–0.5)
IMM GRANULOCYTES NFR BLD AUTO: 0 % (ref 0–5)
LDLC SERPL CALC-MCNC: 117 MG/DL (ref 0–100)
LYMPHOCYTES # BLD: 4.1 K/UL (ref 0.5–4.6)
LYMPHOCYTES NFR BLD: 52 % (ref 13–44)
MCH RBC QN AUTO: 27.9 PG (ref 26.1–32.9)
MCHC RBC AUTO-ENTMCNC: 30.4 G/DL (ref 31.4–35)
MCV RBC AUTO: 91.8 FL (ref 82–102)
MONOCYTES # BLD: 0.5 K/UL (ref 0.1–1.3)
MONOCYTES NFR BLD: 7 % (ref 4–12)
NEUTS SEG # BLD: 3 K/UL (ref 1.7–8.2)
NEUTS SEG NFR BLD: 39 % (ref 43–78)
NRBC # BLD: 0 K/UL (ref 0–0.2)
PLATELET # BLD AUTO: 301 K/UL (ref 150–450)
PMV BLD AUTO: 11.2 FL (ref 9.4–12.3)
POTASSIUM SERPL-SCNC: 4.6 MMOL/L (ref 3.5–5.1)
PROT SERPL-MCNC: 6.7 G/DL (ref 6.3–8.2)
RBC # BLD AUTO: 4.62 M/UL (ref 4.05–5.2)
SODIUM SERPL-SCNC: 139 MMOL/L (ref 136–145)
TRIGL SERPL-MCNC: 135 MG/DL (ref 0–150)
TSH, 3RD GENERATION: 1.9 UIU/ML (ref 0.27–4.2)
VLDLC SERPL CALC-MCNC: 27 MG/DL (ref 6–23)
WBC # BLD AUTO: 7.8 K/UL (ref 4.3–11.1)

## 2024-05-16 PROCEDURE — 1090F PRES/ABSN URINE INCON ASSESS: CPT | Performed by: FAMILY MEDICINE

## 2024-05-16 PROCEDURE — G8427 DOCREV CUR MEDS BY ELIG CLIN: HCPCS | Performed by: FAMILY MEDICINE

## 2024-05-16 PROCEDURE — G8400 PT W/DXA NO RESULTS DOC: HCPCS | Performed by: FAMILY MEDICINE

## 2024-05-16 PROCEDURE — 1123F ACP DISCUSS/DSCN MKR DOCD: CPT | Performed by: FAMILY MEDICINE

## 2024-05-16 PROCEDURE — G8417 CALC BMI ABV UP PARAM F/U: HCPCS | Performed by: FAMILY MEDICINE

## 2024-05-16 PROCEDURE — 99214 OFFICE O/P EST MOD 30 MIN: CPT | Performed by: FAMILY MEDICINE

## 2024-05-16 PROCEDURE — 1036F TOBACCO NON-USER: CPT | Performed by: FAMILY MEDICINE

## 2024-05-16 RX ORDER — PRAVASTATIN SODIUM 40 MG
40 TABLET ORAL DAILY
Qty: 90 TABLET | Refills: 3 | Status: SHIPPED | OUTPATIENT
Start: 2024-05-16 | End: 2025-05-16

## 2024-05-16 RX ORDER — LEVOTHYROXINE SODIUM 0.05 MG/1
50 TABLET ORAL
Qty: 90 TABLET | Refills: 3 | Status: SHIPPED | OUTPATIENT
Start: 2024-05-16 | End: 2025-05-11

## 2024-05-16 RX ORDER — MECLIZINE HCL 12.5 MG/1
12.5 TABLET ORAL 3 TIMES DAILY PRN
Qty: 90 TABLET | Refills: 3 | Status: SHIPPED | OUTPATIENT
Start: 2024-05-16

## 2024-05-16 RX ORDER — FERROUS SULFATE 325(65) MG
325 TABLET ORAL
Qty: 90 TABLET | Refills: 3 | Status: SHIPPED | OUTPATIENT
Start: 2024-05-16

## 2024-05-16 RX ORDER — OMEPRAZOLE 40 MG/1
40 CAPSULE, DELAYED RELEASE ORAL DAILY
Qty: 90 CAPSULE | Refills: 3 | Status: SHIPPED | OUTPATIENT
Start: 2024-05-16 | End: 2025-05-11

## 2024-05-16 ASSESSMENT — PATIENT HEALTH QUESTIONNAIRE - PHQ9
SUM OF ALL RESPONSES TO PHQ QUESTIONS 1-9: 0
SUM OF ALL RESPONSES TO PHQ9 QUESTIONS 1 & 2: 0
SUM OF ALL RESPONSES TO PHQ QUESTIONS 1-9: 0
2. FEELING DOWN, DEPRESSED OR HOPELESS: NOT AT ALL
1. LITTLE INTEREST OR PLEASURE IN DOING THINGS: NOT AT ALL
SUM OF ALL RESPONSES TO PHQ QUESTIONS 1-9: 0
SUM OF ALL RESPONSES TO PHQ QUESTIONS 1-9: 0

## 2024-05-16 ASSESSMENT — ENCOUNTER SYMPTOMS
RHINORRHEA: 0
ABDOMINAL PAIN: 0
STRIDOR: 0
COUGH: 0
CONSTIPATION: 0
EYE DISCHARGE: 0
SINUS PAIN: 0
SORE THROAT: 0
VOMITING: 0
SHORTNESS OF BREATH: 0
BACK PAIN: 0
DIARRHEA: 0
FACIAL SWELLING: 0
SINUS PRESSURE: 0
BLOOD IN STOOL: 0
NAUSEA: 0
WHEEZING: 0
EYE REDNESS: 0
EYE PAIN: 0
ABDOMINAL DISTENTION: 0
COLOR CHANGE: 0
CHEST TIGHTNESS: 0
EYE ITCHING: 0

## 2024-05-16 NOTE — ASSESSMENT & PLAN NOTE
LDL at goal on last check. Continue statin. Encouraged to reduce red meat, fried foods, fast foods, butter, mayonnaise and dairy products; increase daily exercise and limit intake of egg yolks to < 7 egg yolks weekly.

## 2024-05-16 NOTE — ASSESSMENT & PLAN NOTE
Ok to continue omeprazole. Has not been able to tolerate d/c. Educated on lifestyle modifications with instructions to reduce large meals, particularly before bedtime; reduce spicy foods, caffeine, alcohol and acidic foods/drinks, avoid smoking.

## 2024-05-16 NOTE — ASSESSMENT & PLAN NOTE
F/u A1c. Encouraged to control intake of sugar, bread, pasta, rice, potatoes, fruit, snack foods, desserts in diet.

## 2024-05-16 NOTE — PROGRESS NOTES
[facility]  _______________________________________  Guy Li MD                 [address] [place] [address]        [place] [address]                                                                                    Phone: [phone]                                                                                    Fax: [phone]    Kanwal Jacobsen is a 83 y.o. female who is seen for evaluation of   Chief Complaint   Patient presents with    Hypertension    Gastroesophageal Reflux    Thyroid Problem    Cholesterol Problem    Depression    Anemia       HPI:   Mrs. Jacobsen is am 83 y/o F with h/o glaucoma, BPV, anxiety, HLD, GERD, primary hypothyroidism, FESTUS, here for f/u. Accompanied by her daughter.     -BPV- initially stated that her s/s have not improved and PT told her there was nothing more they could offer. Later indicated that s/s antivert significantly improved her s/s.      - FESTUS- on iron since 5/2023. Hgb now wnl. She stopped iron supplement last month.     - GERD- on prilosec daily. Previously on bid, but had brittle nails.     - MDD/anxiety- pt has restarted her celexa. She has some concerns that the medication has negative spiritual implications. She has since stopped the medication indicating that it \"made her feel weird\" and felt it caused dizziness. Her dizziness has persisted despite stopping medication.     - HLD- LDL 97. Tolerates pravastatin.     - primary hypothyroidism- TSH wnl.    - glaucoma- managed       Eye Problem   Pertinent negatives include no eye discharge, eye redness, fever, itching, nausea, vomiting or weakness.   Otalgia   There is pain in both ears. This is a recurrent problem. The current episode started 1 to 4 weeks ago. The problem occurs constantly. The problem has been waxing and waning. There has been no fever. The fever has been present for 3 to 4 days. The pain is at a severity of 4/10. The pain is moderate.

## 2024-05-22 DIAGNOSIS — K21.9 GASTROESOPHAGEAL REFLUX DISEASE, UNSPECIFIED WHETHER ESOPHAGITIS PRESENT: ICD-10-CM

## 2024-05-22 DIAGNOSIS — E78.2 MIXED HYPERLIPIDEMIA: ICD-10-CM

## 2024-05-22 RX ORDER — PRAVASTATIN SODIUM 40 MG
40 TABLET ORAL DAILY
Qty: 90 TABLET | Refills: 3 | OUTPATIENT
Start: 2024-05-22 | End: 2025-05-22

## 2024-05-22 RX ORDER — OMEPRAZOLE 40 MG/1
40 CAPSULE, DELAYED RELEASE ORAL DAILY
Qty: 90 CAPSULE | Refills: 3 | OUTPATIENT
Start: 2024-05-22 | End: 2025-05-17

## 2024-05-24 DIAGNOSIS — D50.9 IRON DEFICIENCY ANEMIA, UNSPECIFIED IRON DEFICIENCY ANEMIA TYPE: ICD-10-CM

## 2024-05-24 DIAGNOSIS — H81.13 BENIGN PAROXYSMAL POSITIONAL VERTIGO DUE TO BILATERAL VESTIBULAR DISORDER: ICD-10-CM

## 2024-05-24 DIAGNOSIS — K21.9 GASTROESOPHAGEAL REFLUX DISEASE, UNSPECIFIED WHETHER ESOPHAGITIS PRESENT: ICD-10-CM

## 2024-05-24 DIAGNOSIS — E78.2 MIXED HYPERLIPIDEMIA: ICD-10-CM

## 2024-05-24 RX ORDER — LEVOTHYROXINE SODIUM 0.05 MG/1
50 TABLET ORAL
Qty: 90 TABLET | Refills: 3 | Status: SHIPPED | OUTPATIENT
Start: 2024-05-24 | End: 2025-05-19

## 2024-05-24 RX ORDER — FERROUS SULFATE 325(65) MG
325 TABLET ORAL
Qty: 90 TABLET | Refills: 3 | Status: SHIPPED | OUTPATIENT
Start: 2024-05-24

## 2024-05-24 RX ORDER — PRAVASTATIN SODIUM 40 MG
40 TABLET ORAL DAILY
Qty: 90 TABLET | Refills: 3 | Status: SHIPPED | OUTPATIENT
Start: 2024-05-24 | End: 2025-05-24

## 2024-05-24 RX ORDER — MECLIZINE HCL 12.5 MG/1
12.5 TABLET ORAL 3 TIMES DAILY PRN
Qty: 90 TABLET | Refills: 3 | Status: SHIPPED | OUTPATIENT
Start: 2024-05-24

## 2024-05-24 RX ORDER — OMEPRAZOLE 40 MG/1
40 CAPSULE, DELAYED RELEASE ORAL DAILY
Qty: 90 CAPSULE | Refills: 3 | Status: SHIPPED | OUTPATIENT
Start: 2024-05-24 | End: 2025-05-19

## 2024-05-24 NOTE — TELEPHONE ENCOUNTER
Pt calls in to report that she does not use [facility] now, she requests all these recent RX be resent to local [facility]

## 2024-07-22 ENCOUNTER — OFFICE VISIT (OUTPATIENT)
Dept: FAMILY MEDICINE CLINIC | Facility: CLINIC | Age: 84
End: 2024-07-22
Payer: MEDICARE

## 2024-07-22 VITALS
BODY MASS INDEX: 33.63 KG/M2 | HEIGHT: 64 IN | DIASTOLIC BLOOD PRESSURE: 78 MMHG | SYSTOLIC BLOOD PRESSURE: 139 MMHG | WEIGHT: 197 LBS

## 2024-07-22 DIAGNOSIS — R73.03 PRE-DIABETES: ICD-10-CM

## 2024-07-22 DIAGNOSIS — J32.1 CHRONIC FRONTAL SINUSITIS: ICD-10-CM

## 2024-07-22 DIAGNOSIS — E03.9 PRIMARY HYPOTHYROIDISM: ICD-10-CM

## 2024-07-22 DIAGNOSIS — H81.13 BENIGN PAROXYSMAL POSITIONAL VERTIGO DUE TO BILATERAL VESTIBULAR DISORDER: ICD-10-CM

## 2024-07-22 DIAGNOSIS — F32.A DEPRESSION, UNSPECIFIED DEPRESSION TYPE: Primary | ICD-10-CM

## 2024-07-22 PROCEDURE — 1036F TOBACCO NON-USER: CPT | Performed by: FAMILY MEDICINE

## 2024-07-22 PROCEDURE — 1123F ACP DISCUSS/DSCN MKR DOCD: CPT | Performed by: FAMILY MEDICINE

## 2024-07-22 PROCEDURE — G8417 CALC BMI ABV UP PARAM F/U: HCPCS | Performed by: FAMILY MEDICINE

## 2024-07-22 PROCEDURE — G8427 DOCREV CUR MEDS BY ELIG CLIN: HCPCS | Performed by: FAMILY MEDICINE

## 2024-07-22 PROCEDURE — 99214 OFFICE O/P EST MOD 30 MIN: CPT | Performed by: FAMILY MEDICINE

## 2024-07-22 PROCEDURE — G8400 PT W/DXA NO RESULTS DOC: HCPCS | Performed by: FAMILY MEDICINE

## 2024-07-22 PROCEDURE — 1090F PRES/ABSN URINE INCON ASSESS: CPT | Performed by: FAMILY MEDICINE

## 2024-07-22 RX ORDER — AZELASTINE 1 MG/ML
1 SPRAY, METERED NASAL 2 TIMES DAILY
Qty: 60 ML | Refills: 3 | Status: SHIPPED | OUTPATIENT
Start: 2024-07-22

## 2024-07-22 RX ORDER — FLUOXETINE 10 MG/1
10 CAPSULE ORAL NIGHTLY
Qty: 30 CAPSULE | Refills: 3 | Status: SHIPPED | OUTPATIENT
Start: 2024-07-22

## 2024-07-22 ASSESSMENT — ENCOUNTER SYMPTOMS
SINUS PAIN: 0
SORE THROAT: 0
NAUSEA: 0
CONSTIPATION: 0
EYE PAIN: 0
DIARRHEA: 0
VOMITING: 0
RHINORRHEA: 0
STRIDOR: 0
WHEEZING: 0
COLOR CHANGE: 0
ABDOMINAL DISTENTION: 0
COUGH: 0
EYE ITCHING: 0
BACK PAIN: 0
FACIAL SWELLING: 0
EYE DISCHARGE: 0
BLOOD IN STOOL: 0
SHORTNESS OF BREATH: 0
CHEST TIGHTNESS: 0
SINUS PRESSURE: 0
EYE REDNESS: 0
ABDOMINAL PAIN: 0

## 2024-07-22 NOTE — PROGRESS NOTES
fluticasone (FLONASE) 50 MCG/ACT nasal spray    azelastine (ASTELIN) 0.1 % nasal spray       Endocrine    Primary hypothyroidism      TSH has been at goal. Continue levothroxine mcg. Wt stable.          Relevant Medications    levothyroxine (SYNTHROID) 50 MCG tablet       Nervous and Auditory    Benign paroxysmal positional vertigo due to bilateral vestibular disorder     Continue antivert prn. Mri brain negative. Bp controlled.          Relevant Medications    meclizine (ANTIVERT) 12.5 MG tablet       Other    Pre-diabetes      Limit carbs, reduce wt. Monitor A1c.          Depression - Primary      Concerned some of her s/s are uncontrolled MDD. Discussed tx options. Start prozac given difficulty with celexa.          Relevant Medications    citalopram (CELEXA) 10 MG tablet    FLUoxetine (PROZAC) 10 MG capsule        Milton Santana III, MD

## 2024-07-23 NOTE — ASSESSMENT & PLAN NOTE
Concerned some of her s/s are uncontrolled MDD. Discussed tx options. Start prozac given difficulty with celexa.

## 2024-08-19 ENCOUNTER — OFFICE VISIT (OUTPATIENT)
Dept: FAMILY MEDICINE CLINIC | Facility: CLINIC | Age: 84
End: 2024-08-19
Payer: MEDICARE

## 2024-08-19 VITALS
HEIGHT: 64 IN | BODY MASS INDEX: 33.12 KG/M2 | SYSTOLIC BLOOD PRESSURE: 118 MMHG | DIASTOLIC BLOOD PRESSURE: 78 MMHG | WEIGHT: 194 LBS

## 2024-08-19 DIAGNOSIS — F32.A DEPRESSION, UNSPECIFIED DEPRESSION TYPE: ICD-10-CM

## 2024-08-19 DIAGNOSIS — N30.01 ACUTE CYSTITIS WITH HEMATURIA: Primary | ICD-10-CM

## 2024-08-19 DIAGNOSIS — F51.01 PRIMARY INSOMNIA: ICD-10-CM

## 2024-08-19 DIAGNOSIS — G44.221 CHRONIC TENSION-TYPE HEADACHE, INTRACTABLE: ICD-10-CM

## 2024-08-19 PROBLEM — R30.0 DYSURIA: Status: ACTIVE | Noted: 2024-08-19

## 2024-08-19 LAB
BILIRUBIN, URINE, POC: ABNORMAL
BLOOD URINE, POC: NEGATIVE
GLUCOSE URINE, POC: ABNORMAL
KETONES, URINE, POC: ABNORMAL
LEUKOCYTE ESTERASE, URINE, POC: ABNORMAL
NITRITE, URINE, POC: POSITIVE
PH, URINE, POC: 7 (ref 4.6–8)
PROTEIN,URINE, POC: NEGATIVE
SPECIFIC GRAVITY, URINE, POC: 1.01 (ref 1–1.03)
URINALYSIS CLARITY, POC: CLEAR
URINALYSIS COLOR, POC: ABNORMAL
UROBILINOGEN, POC: ABNORMAL

## 2024-08-19 PROCEDURE — G8417 CALC BMI ABV UP PARAM F/U: HCPCS | Performed by: FAMILY MEDICINE

## 2024-08-19 PROCEDURE — 99214 OFFICE O/P EST MOD 30 MIN: CPT | Performed by: FAMILY MEDICINE

## 2024-08-19 PROCEDURE — 1036F TOBACCO NON-USER: CPT | Performed by: FAMILY MEDICINE

## 2024-08-19 PROCEDURE — G8427 DOCREV CUR MEDS BY ELIG CLIN: HCPCS | Performed by: FAMILY MEDICINE

## 2024-08-19 PROCEDURE — 1090F PRES/ABSN URINE INCON ASSESS: CPT | Performed by: FAMILY MEDICINE

## 2024-08-19 PROCEDURE — 1123F ACP DISCUSS/DSCN MKR DOCD: CPT | Performed by: FAMILY MEDICINE

## 2024-08-19 PROCEDURE — 81003 URINALYSIS AUTO W/O SCOPE: CPT | Performed by: FAMILY MEDICINE

## 2024-08-19 PROCEDURE — G8400 PT W/DXA NO RESULTS DOC: HCPCS | Performed by: FAMILY MEDICINE

## 2024-08-19 RX ORDER — FLUOXETINE 10 MG/1
10 CAPSULE ORAL NIGHTLY
Qty: 90 CAPSULE | Refills: 3 | Status: SHIPPED | OUTPATIENT
Start: 2024-08-19

## 2024-08-19 RX ORDER — CEPHALEXIN 500 MG/1
500 CAPSULE ORAL 2 TIMES DAILY
Qty: 10 CAPSULE | Refills: 0 | Status: SHIPPED | OUTPATIENT
Start: 2024-08-19 | End: 2024-08-24

## 2024-08-19 RX ORDER — BUTALBITAL, ACETAMINOPHEN AND CAFFEINE 50; 325; 40 MG/1; MG/1; MG/1
1 TABLET ORAL EVERY 6 HOURS PRN
Qty: 30 TABLET | Refills: 0 | Status: SHIPPED | OUTPATIENT
Start: 2024-08-19

## 2024-08-19 RX ORDER — DOXEPIN HYDROCHLORIDE 10 MG/1
10 CAPSULE ORAL NIGHTLY
Qty: 30 CAPSULE | Refills: 3 | Status: SHIPPED | OUTPATIENT
Start: 2024-08-19

## 2024-08-19 ASSESSMENT — ENCOUNTER SYMPTOMS
COUGH: 0
BLOOD IN STOOL: 0
ABDOMINAL DISTENTION: 0
RHINORRHEA: 0
WHEEZING: 0
CHEST TIGHTNESS: 0
EYE ITCHING: 0
EYE PAIN: 0
STRIDOR: 0
COLOR CHANGE: 0
SINUS PRESSURE: 0
SORE THROAT: 0
SHORTNESS OF BREATH: 0
ABDOMINAL PAIN: 0
SINUS PAIN: 0
VOMITING: 0
EYE REDNESS: 0
EYE DISCHARGE: 0
NAUSEA: 0
DIARRHEA: 0
FACIAL SWELLING: 0
BACK PAIN: 0
CONSTIPATION: 0

## 2024-08-19 NOTE — ASSESSMENT & PLAN NOTE
Pt was instructed to take medications as prescribed, increase oral fluids, reduce caffeine. RTC if pt notices increasing fever/chills not responsive to tylenol/nsaids, LOC changes, kirsten hematuria, flank pain, inability to urinate. Pt verbalizes understanding.

## 2024-08-19 NOTE — PROGRESS NOTES
[facility]  _______________________________________  Guy Li MD                 [address] [place] [address]        [place] [address]                                                                                    Phone: [phone]                                                                                    Fax: [phone]    Kanwal Jacobsen is a 83 y.o. female who is seen for evaluation of   Chief Complaint   Patient presents with    Dysuria     X 1 month, patient has been taking AZO     Headache     Ongoing since last visit. Feels it was never addressed       HPI:   Mrs. Jacobsen is am 81 y/o F with h/o glaucoma, BPV, anxiety, HLD, GERD, primary hypothyroidism, FESTUS, here for f/u. Accompanied by her son who provides some of the hx.     - pt is very concerned that she has difficulty with memory, mostly short-term. Unable to remember mostly insignificant issues. No difficulty with major events or family names. States some days she does not fell like getting out of the chair. Son denies any noticing any significant issues with memory.    - MDD/anxiety- pt stopped celexa due to feeling as though the medication prevented her from feeling the Holy Spirit. Started prozac last visit. States mood is somewhat better. Still not sleeping.     -BPV- still using meclizine though it sounds as though she is using prn. S/p PT.     - FESTUS- on iron since 5/2023. Hgb now wnl. She stopped iron supplement last month.     - GERD- on prilosec daily. Previously on bid, but had brittle nails.     - HLD- LDL 97. Tolerates pravastatin.     - primary hypothyroidism- TSH wnl.    - glaucoma- managed       Eye Problem   Pertinent negatives include no eye discharge, eye redness, fever, itching, nausea, vomiting or weakness.   Otalgia   There is pain in both ears. This is a recurrent problem. The current episode started 1 to 4 weeks ago. The problem occurs constantly. The

## 2024-08-19 NOTE — ASSESSMENT & PLAN NOTE
Instructed to stop benadryl nightly. Start trial on doxepin. Sleep hygiene discussed at length-Educated on importance of abstaining from caffeine after 3 pm, use of electronic devices just before and after retiring to bed. Instructed to try melatonin 10mg prior to bed, reduce lights in home 1 hr prior to bed, plan on at least 8 hrs of sleep nightly and retire to bed at a consistent time nightly.

## 2024-08-19 NOTE — ASSESSMENT & PLAN NOTE
Tension vs atypical migraine.   MRI negative last year.   Failed all conservative measures  Start short trial on butalbital as her pain is so bothersome.

## 2024-08-21 LAB
BACTERIA SPEC CULT: NORMAL
BACTERIA SPEC CULT: NORMAL
SERVICE CMNT-IMP: NORMAL

## 2024-09-04 ENCOUNTER — OFFICE VISIT (OUTPATIENT)
Dept: FAMILY MEDICINE CLINIC | Facility: CLINIC | Age: 84
End: 2024-09-04
Payer: MEDICARE

## 2024-09-04 VITALS — WEIGHT: 192 LBS | BODY MASS INDEX: 32.96 KG/M2

## 2024-09-04 DIAGNOSIS — F51.01 PRIMARY INSOMNIA: ICD-10-CM

## 2024-09-04 DIAGNOSIS — R73.03 PRE-DIABETES: Primary | ICD-10-CM

## 2024-09-04 DIAGNOSIS — F32.A DEPRESSION, UNSPECIFIED DEPRESSION TYPE: ICD-10-CM

## 2024-09-04 DIAGNOSIS — G44.221 CHRONIC TENSION-TYPE HEADACHE, INTRACTABLE: ICD-10-CM

## 2024-09-04 PROCEDURE — 99214 OFFICE O/P EST MOD 30 MIN: CPT | Performed by: FAMILY MEDICINE

## 2024-09-04 PROCEDURE — 1036F TOBACCO NON-USER: CPT | Performed by: FAMILY MEDICINE

## 2024-09-04 PROCEDURE — G8427 DOCREV CUR MEDS BY ELIG CLIN: HCPCS | Performed by: FAMILY MEDICINE

## 2024-09-04 PROCEDURE — 1123F ACP DISCUSS/DSCN MKR DOCD: CPT | Performed by: FAMILY MEDICINE

## 2024-09-04 PROCEDURE — G8400 PT W/DXA NO RESULTS DOC: HCPCS | Performed by: FAMILY MEDICINE

## 2024-09-04 PROCEDURE — G8417 CALC BMI ABV UP PARAM F/U: HCPCS | Performed by: FAMILY MEDICINE

## 2024-09-04 PROCEDURE — 1090F PRES/ABSN URINE INCON ASSESS: CPT | Performed by: FAMILY MEDICINE

## 2024-09-04 RX ORDER — BUTALBITAL, ACETAMINOPHEN AND CAFFEINE 50; 325; 40 MG/1; MG/1; MG/1
1 TABLET ORAL EVERY 6 HOURS PRN
Qty: 30 TABLET | Refills: 0 | Status: SHIPPED | OUTPATIENT
Start: 2024-09-04

## 2024-09-04 RX ORDER — TEMAZEPAM 15 MG/1
15 CAPSULE ORAL NIGHTLY PRN
Qty: 30 CAPSULE | Refills: 1 | Status: SHIPPED | OUTPATIENT
Start: 2024-09-04 | End: 2024-09-05

## 2024-09-04 ASSESSMENT — ENCOUNTER SYMPTOMS
DIARRHEA: 0
EYE DISCHARGE: 0
BACK PAIN: 0
SINUS PRESSURE: 0
RHINORRHEA: 0
SORE THROAT: 0
SINUS PAIN: 0
COUGH: 0
FACIAL SWELLING: 0
EYE REDNESS: 0
EYE ITCHING: 0
COLOR CHANGE: 0
STRIDOR: 0
BLOOD IN STOOL: 0
CHEST TIGHTNESS: 0
SHORTNESS OF BREATH: 0
NAUSEA: 0
ABDOMINAL PAIN: 0
VOMITING: 0
CONSTIPATION: 0
ABDOMINAL DISTENTION: 0
WHEEZING: 0
EYE PAIN: 0

## 2024-09-04 NOTE — ASSESSMENT & PLAN NOTE
Stable on prozac with few s/e. Continue this dose. Still concerned that she has difficulty in praying on ssri. Reassurance given.

## 2024-09-04 NOTE — ASSESSMENT & PLAN NOTE
Failed doxepin. Will try her on restoril though not ideal. She seems to have a significant amount of anxiety at night and this may help alleviate some of this to allow for more sleep.

## 2024-09-04 NOTE — PROGRESS NOTES
capsule    FLUoxetine (PROZAC) 10 MG capsule    temazepam (RESTORIL) 15 MG capsule    Chronic tension-type headache, intractable       Fioricet has helped. Will continue same dose. Concerned she may have a cervicogenic component.          Relevant Medications    doxepin (SINEQUAN) 10 MG capsule    FLUoxetine (PROZAC) 10 MG capsule    butalbital-acetaminophen-caffeine (FIORICET, ESGIC) -40 MG per tablet    Primary insomnia      Failed doxepin. Will try her on restoril though not ideal. She seems to have a significant amount of anxiety at night and this may help alleviate some of this to allow for more sleep.          Relevant Medications    doxepin (SINEQUAN) 10 MG capsule    temazepam (RESTORIL) 15 MG capsule        Milton Santana III, MD

## 2024-09-04 NOTE — ASSESSMENT & PLAN NOTE
Follow A1c. Encouraged to control intake of sugar, bread, pasta, rice, potatoes, fruit, snack foods, desserts in diet.

## 2024-09-05 ENCOUNTER — TELEPHONE (OUTPATIENT)
Dept: FAMILY MEDICINE CLINIC | Facility: CLINIC | Age: 84
End: 2024-09-05

## 2024-09-05 DIAGNOSIS — F51.01 PRIMARY INSOMNIA: Primary | ICD-10-CM

## 2024-09-05 RX ORDER — LORAZEPAM 0.5 MG/1
0.5 TABLET ORAL NIGHTLY PRN
Qty: 30 TABLET | Refills: 0 | Status: SHIPPED | OUTPATIENT
Start: 2024-09-05 | End: 2025-09-05

## 2024-09-05 NOTE — TELEPHONE ENCOUNTER
[facility] reached out regarding the RX Temazepam, it is not a covered item on her plan. They are requesting a change to Zolpidem tart    Please advise

## 2024-10-16 ENCOUNTER — OFFICE VISIT (OUTPATIENT)
Dept: FAMILY MEDICINE CLINIC | Facility: CLINIC | Age: 84
End: 2024-10-16
Payer: MEDICARE

## 2024-10-16 VITALS
SYSTOLIC BLOOD PRESSURE: 138 MMHG | DIASTOLIC BLOOD PRESSURE: 70 MMHG | HEIGHT: 64 IN | WEIGHT: 189 LBS | BODY MASS INDEX: 32.27 KG/M2

## 2024-10-16 DIAGNOSIS — R73.03 PRE-DIABETES: ICD-10-CM

## 2024-10-16 DIAGNOSIS — F32.A DEPRESSION, UNSPECIFIED DEPRESSION TYPE: ICD-10-CM

## 2024-10-16 DIAGNOSIS — F51.01 PRIMARY INSOMNIA: Primary | ICD-10-CM

## 2024-10-16 DIAGNOSIS — Z23 FLU VACCINE NEED: ICD-10-CM

## 2024-10-16 DIAGNOSIS — G44.221 CHRONIC TENSION-TYPE HEADACHE, INTRACTABLE: ICD-10-CM

## 2024-10-16 PROCEDURE — 99214 OFFICE O/P EST MOD 30 MIN: CPT | Performed by: FAMILY MEDICINE

## 2024-10-16 PROCEDURE — 1036F TOBACCO NON-USER: CPT | Performed by: FAMILY MEDICINE

## 2024-10-16 PROCEDURE — G8417 CALC BMI ABV UP PARAM F/U: HCPCS | Performed by: FAMILY MEDICINE

## 2024-10-16 PROCEDURE — G8427 DOCREV CUR MEDS BY ELIG CLIN: HCPCS | Performed by: FAMILY MEDICINE

## 2024-10-16 PROCEDURE — G8400 PT W/DXA NO RESULTS DOC: HCPCS | Performed by: FAMILY MEDICINE

## 2024-10-16 PROCEDURE — 1090F PRES/ABSN URINE INCON ASSESS: CPT | Performed by: FAMILY MEDICINE

## 2024-10-16 PROCEDURE — 1123F ACP DISCUSS/DSCN MKR DOCD: CPT | Performed by: FAMILY MEDICINE

## 2024-10-16 PROCEDURE — G8484 FLU IMMUNIZE NO ADMIN: HCPCS | Performed by: FAMILY MEDICINE

## 2024-10-16 RX ORDER — TEMAZEPAM 15 MG/1
15 CAPSULE ORAL NIGHTLY PRN
Qty: 15 CAPSULE | Refills: 2 | Status: SHIPPED | OUTPATIENT
Start: 2024-10-16 | End: 2024-10-31

## 2024-10-16 RX ORDER — BUTALBITAL, ACETAMINOPHEN AND CAFFEINE 50; 325; 40 MG/1; MG/1; MG/1
1 TABLET ORAL EVERY 6 HOURS PRN
Qty: 30 TABLET | Refills: 2 | Status: SHIPPED | OUTPATIENT
Start: 2024-10-16

## 2024-10-16 SDOH — ECONOMIC STABILITY: FOOD INSECURITY: WITHIN THE PAST 12 MONTHS, THE FOOD YOU BOUGHT JUST DIDN'T LAST AND YOU DIDN'T HAVE MONEY TO GET MORE.: NEVER TRUE

## 2024-10-16 SDOH — ECONOMIC STABILITY: INCOME INSECURITY: HOW HARD IS IT FOR YOU TO PAY FOR THE VERY BASICS LIKE FOOD, HOUSING, MEDICAL CARE, AND HEATING?: SOMEWHAT HARD

## 2024-10-16 SDOH — ECONOMIC STABILITY: FOOD INSECURITY: WITHIN THE PAST 12 MONTHS, YOU WORRIED THAT YOUR FOOD WOULD RUN OUT BEFORE YOU GOT MONEY TO BUY MORE.: NEVER TRUE

## 2024-10-16 ASSESSMENT — ENCOUNTER SYMPTOMS
WHEEZING: 0
SORE THROAT: 0
BACK PAIN: 0
RHINORRHEA: 0
SHORTNESS OF BREATH: 0
FACIAL SWELLING: 0
EYE PAIN: 0
VOMITING: 0
EYE ITCHING: 0
ABDOMINAL PAIN: 0
SINUS PAIN: 0
ABDOMINAL DISTENTION: 0
CHEST TIGHTNESS: 0
BLOOD IN STOOL: 0
COUGH: 0
DIARRHEA: 0
CONSTIPATION: 0
COLOR CHANGE: 0
EYE DISCHARGE: 0
SINUS PRESSURE: 0
STRIDOR: 0
NAUSEA: 0
EYE REDNESS: 0

## 2024-10-16 NOTE — PROGRESS NOTES
[facility]  _______________________________________  Guy Li MD                 [address] [place] [address]        M[place] [address]                                                                                    Phone: [phone]                                                                                    Fax: [phone]    Kanwal Jacobsen is a 83 y.o. female who is seen for evaluation of   Chief Complaint   Patient presents with    Insomnia     6 week followup, discuss the Doxepin       HPI:   Mrs. Jacobsen is am 81 y/o F with h/o glaucoma, BPV, anxiety, HLD, GERD, primary hypothyroidism, FESTUS, here for f/u. Accompanied by her son who provides some of the hx.     - h/a continues but she has finally found improved s/s relief with fioricet.     - insomnia- started doxepin. Feels like bugs are crawling on her after taking.     - pt is very concerned that she has difficulty with memory, mostly short-term. Unable to remember mostly insignificant issues. No difficulty with major events or family names. States some days she does not fell like getting out of the chair. Son denies any noticing any significant issues with memory.    - MDD/anxiety- pt stopped celexa due to feeling as though the medication prevented her from feeling the Holy Spirit. Started prozac last visit. States mood is somewhat better. Still not sleeping.     -BPV- still using meclizine though it sounds as though she is using prn. S/p PT.     - FESTUS- on iron since 5/2023. Hgb now wnl. She stopped iron supplement last month.     - GERD- on prilosec daily. Previously on bid, but had brittle nails.     - HLD- LDL 97. Tolerates pravastatin.     - primary hypothyroidism- TSH wnl.    - glaucoma- managed       Eye Problem   Pertinent negatives include no eye discharge, eye redness, fever, itching, nausea, vomiting or weakness.   Otalgia   There is pain in both ears. This is a recurrent

## 2024-10-16 NOTE — ASSESSMENT & PLAN NOTE
Much improved with restoril. Continue 15mg. She has had no residual sedation or falls. She has had no success with other agents or conservative measures. Sleep hygiene discussed at length-Educated on importance of abstaining from caffeine after 3 pm, use of electronic devices just before and after retiring to bed. Instructed to try melatonin 10mg prior to bed, reduce lights in home 1 hr prior to bed, plan on at least 8 hrs of sleep nightly and retire to bed at a consistent time nightly.

## 2024-10-17 ENCOUNTER — TELEPHONE (OUTPATIENT)
Dept: FAMILY MEDICINE CLINIC | Facility: CLINIC | Age: 84
End: 2024-10-17

## 2024-10-17 NOTE — TELEPHONE ENCOUNTER
[facility] called to report that the Temazepam 15mg capsules are not covered by patients plan. The preferred alternative is Zolpidem Tart.    Please advise

## 2024-10-21 NOTE — TELEPHONE ENCOUNTER
Attempted PA on [facility]. Alert said could not locate patient.  Called [facility] to see how much the RX was and they reported it is under 13.00 with coupon card and pt paid for it.    If still wanting PA we are to call [phone]

## 2025-01-31 ENCOUNTER — OFFICE VISIT (OUTPATIENT)
Dept: FAMILY MEDICINE CLINIC | Facility: CLINIC | Age: 85
End: 2025-01-31

## 2025-01-31 VITALS — WEIGHT: 185 LBS | BODY MASS INDEX: 31.58 KG/M2 | HEIGHT: 64 IN

## 2025-01-31 DIAGNOSIS — F51.01 PRIMARY INSOMNIA: ICD-10-CM

## 2025-01-31 DIAGNOSIS — F32.A DEPRESSION, UNSPECIFIED DEPRESSION TYPE: ICD-10-CM

## 2025-01-31 DIAGNOSIS — Z00.00 MEDICARE ANNUAL WELLNESS VISIT, SUBSEQUENT: Primary | ICD-10-CM

## 2025-01-31 DIAGNOSIS — R73.03 PRE-DIABETES: ICD-10-CM

## 2025-01-31 DIAGNOSIS — H81.13 BENIGN PAROXYSMAL POSITIONAL VERTIGO DUE TO BILATERAL VESTIBULAR DISORDER: ICD-10-CM

## 2025-01-31 DIAGNOSIS — B35.1 ONYCHOMYCOSIS: ICD-10-CM

## 2025-01-31 DIAGNOSIS — G44.221 CHRONIC TENSION-TYPE HEADACHE, INTRACTABLE: ICD-10-CM

## 2025-01-31 DIAGNOSIS — E03.9 PRIMARY HYPOTHYROIDISM: ICD-10-CM

## 2025-01-31 DIAGNOSIS — K21.9 GASTROESOPHAGEAL REFLUX DISEASE, UNSPECIFIED WHETHER ESOPHAGITIS PRESENT: ICD-10-CM

## 2025-01-31 RX ORDER — LEVOTHYROXINE SODIUM 50 UG/1
50 TABLET ORAL
Qty: 90 TABLET | Refills: 3 | Status: SHIPPED | OUTPATIENT
Start: 2025-01-31 | End: 2026-01-26

## 2025-01-31 RX ORDER — MECLIZINE HCL 12.5 MG 12.5 MG/1
12.5 TABLET ORAL 3 TIMES DAILY PRN
Qty: 90 TABLET | Refills: 3 | Status: SHIPPED | OUTPATIENT
Start: 2025-01-31

## 2025-01-31 RX ORDER — DOXEPIN HYDROCHLORIDE 25 MG/1
25 CAPSULE ORAL NIGHTLY
Qty: 30 CAPSULE | Refills: 3 | Status: SHIPPED | OUTPATIENT
Start: 2025-01-31

## 2025-01-31 RX ORDER — FLUOXETINE 10 MG/1
10 CAPSULE ORAL DAILY
Qty: 90 CAPSULE | Refills: 3 | Status: SHIPPED | OUTPATIENT
Start: 2025-01-31

## 2025-01-31 RX ORDER — OMEPRAZOLE 40 MG/1
40 CAPSULE, DELAYED RELEASE ORAL DAILY
Qty: 90 CAPSULE | Refills: 3 | Status: SHIPPED | OUTPATIENT
Start: 2025-01-31 | End: 2026-01-26

## 2025-01-31 RX ORDER — CICLOPIROX 80 MG/ML
SOLUTION TOPICAL
Qty: 6 ML | Refills: 5 | Status: SHIPPED | OUTPATIENT
Start: 2025-01-31

## 2025-01-31 RX ORDER — BUTALBITAL, ACETAMINOPHEN AND CAFFEINE 50; 325; 40 MG/1; MG/1; MG/1
1 TABLET ORAL EVERY 6 HOURS PRN
Qty: 30 TABLET | Refills: 2 | Status: SHIPPED | OUTPATIENT
Start: 2025-01-31

## 2025-01-31 SDOH — ECONOMIC STABILITY: FOOD INSECURITY: WITHIN THE PAST 12 MONTHS, YOU WORRIED THAT YOUR FOOD WOULD RUN OUT BEFORE YOU GOT MONEY TO BUY MORE.: NEVER TRUE

## 2025-01-31 SDOH — ECONOMIC STABILITY: FOOD INSECURITY: WITHIN THE PAST 12 MONTHS, THE FOOD YOU BOUGHT JUST DIDN'T LAST AND YOU DIDN'T HAVE MONEY TO GET MORE.: NEVER TRUE

## 2025-01-31 ASSESSMENT — ENCOUNTER SYMPTOMS
DIARRHEA: 0
SORE THROAT: 0
SINUS PAIN: 0
COLOR CHANGE: 0
EYE REDNESS: 0
EYE PAIN: 0
VOMITING: 0
SHORTNESS OF BREATH: 0
EYE ITCHING: 0
BACK PAIN: 0
NAUSEA: 0
BLOOD IN STOOL: 0
CONSTIPATION: 0
SINUS PRESSURE: 0
STRIDOR: 0
COUGH: 0
CHEST TIGHTNESS: 0
ABDOMINAL PAIN: 0
FACIAL SWELLING: 0
RHINORRHEA: 0
EYE DISCHARGE: 0
WHEEZING: 0
ABDOMINAL DISTENTION: 0

## 2025-01-31 NOTE — ASSESSMENT & PLAN NOTE
continue omeprazole. Has not been able to tolerate d/c. Educated on lifestyle modifications with instructions to reduce large meals, particularly before bedtime; reduce spicy foods, caffeine, alcohol and acidic foods/drinks, avoid smoking.

## 2025-01-31 NOTE — PATIENT INSTRUCTIONS
to avoid secondhand smoke too.     Stay at a weight that's healthy for you. Talk to your doctor if you need help losing weight.     Try to get 7 to 9 hours of sleep each night.     Limit alcohol to 2 drinks a day for men and 1 drink a day for women. Too much alcohol can cause health problems.     Manage other health problems such as diabetes, high blood pressure, and high cholesterol. If you think you may have a problem with alcohol or drug use, talk to your doctor.   Medicines    Take your medicines exactly as prescribed. Call your doctor if you think you are having a problem with your medicine.     If your doctor recommends aspirin, take the amount directed each day. Make sure you take aspirin and not another kind of pain reliever, such as acetaminophen (Tylenol).   When should you call for help?   Call 911 if you have symptoms of a heart attack. These may include:    Chest pain or pressure, or a strange feeling in the chest.     Sweating.     Shortness of breath.     Pain, pressure, or a strange feeling in the back, neck, jaw, or upper belly or in one or both shoulders or arms.     Lightheadedness or sudden weakness.     A fast or irregular heartbeat.   After you call 911, the  may tell you to chew 1 adult-strength or 2 to 4 low-dose aspirin. Wait for an ambulance. Do not try to drive yourself.  Watch closely for changes in your health, and be sure to contact your doctor if you have any problems.  Where can you learn more?  Go to https://www.[employer].net/patientEd and enter F075 to learn more about \"A Healthy Heart: Care Instructions.\"  Current as of: July 31, 2024  Content Version: 14.3  © 2024 [employer].   Care instructions adapted under license by [employer]. If you have questions about a medical condition or this instruction, always ask your healthcare professional. [employer], disclaims any warranty or liability for your use of this information.    Personalized Preventive

## 2025-01-31 NOTE — PROGRESS NOTES
smoking.          Relevant Medications    meclizine (ANTIVERT) 12.5 MG tablet    omeprazole (PRILOSEC) 40 MG delayed release capsule       Endocrine    Primary hypothyroidism       TSH has been at goal. Continue levothroxine.         Relevant Medications    levothyroxine (SYNTHROID) 50 MCG tablet       Nervous and Auditory    Benign paroxysmal positional vertigo due to bilateral vestibular disorder       Stable. Continue antivert prn.          Relevant Medications    meclizine (ANTIVERT) 12.5 MG tablet       Musculoskeletal and Integument    Onychomycosis     Relevant Medications    ciclopirox (PENLAC) 8 % solution       Other    Pre-diabetes       F/u A1c. Encouraged to control intake of sugar, bread, pasta, rice, potatoes, fruit, snack foods, desserts in diet.          Relevant Orders    Hemoglobin A1C    Depression       Stable. Continue prozac.          Relevant Medications    doxepin (SINEQUAN) 25 MG capsule    FLUoxetine (PROZAC) 10 MG capsule    Chronic tension-type headache, intractable       Pt has only responded to fioricet. Continue same dose prn.          Relevant Medications    doxepin (SINEQUAN) 25 MG capsule    butalbital-acetaminophen-caffeine (FIORICET, ESGIC) -40 MG per tablet    FLUoxetine (PROZAC) 10 MG capsule    Primary insomnia       Worsening. Increase doxepin.          Relevant Medications    doxepin (SINEQUAN) 25 MG capsule    Medicare annual wellness visit, subsequent - Primary         Milton Santana III, MD          Medicare Annual Wellness Visit    Kanwal Jacobesn is here for Medicare AWV (In room with keren Andrew)    Assessment & Plan   Medicare annual wellness visit, subsequent  Primary insomnia  Assessment & Plan:   Worsening. Increase doxepin.   Orders:  -     doxepin (SINEQUAN) 25 MG capsule; Take 1 capsule by mouth nightly For sleep, Disp-30 capsule, R-3Normal  Chronic tension-type headache, intractable  Assessment & Plan:   Pt has only responded to fioricet. Continue

## 2025-03-02 PROBLEM — Z00.00 MEDICARE ANNUAL WELLNESS VISIT, SUBSEQUENT: Status: RESOLVED | Noted: 2025-01-31 | Resolved: 2025-03-02

## 2025-05-04 DIAGNOSIS — F51.01 PRIMARY INSOMNIA: ICD-10-CM

## 2025-05-05 RX ORDER — TEMAZEPAM 15 MG/1
CAPSULE ORAL
Qty: 15 CAPSULE | OUTPATIENT
Start: 2025-05-05

## 2025-05-19 DIAGNOSIS — E78.2 MIXED HYPERLIPIDEMIA: ICD-10-CM

## 2025-05-19 RX ORDER — PRAVASTATIN SODIUM 40 MG
40 TABLET ORAL DAILY
Qty: 30 TABLET | Refills: 0 | Status: SHIPPED | OUTPATIENT
Start: 2025-05-19 | End: 2025-05-21 | Stop reason: SDUPTHER

## 2025-05-19 RX ORDER — PRAVASTATIN SODIUM 40 MG
40 TABLET ORAL DAILY
Qty: 90 TABLET | OUTPATIENT
Start: 2025-05-19 | End: 2026-05-19

## 2025-05-19 NOTE — TELEPHONE ENCOUNTER
Last visit 1-31-25  Next visit None, she cxl her 3 mth in April. Pt has been notified that she will need to schedule her overdue 3 month visit in-order to get the year supply

## 2025-05-20 DIAGNOSIS — E78.2 MIXED HYPERLIPIDEMIA: ICD-10-CM

## 2025-05-20 RX ORDER — PRAVASTATIN SODIUM 40 MG
40 TABLET ORAL DAILY
Qty: 90 TABLET | OUTPATIENT
Start: 2025-05-20 | End: 2026-05-20

## 2025-05-21 ENCOUNTER — OFFICE VISIT (OUTPATIENT)
Dept: FAMILY MEDICINE CLINIC | Facility: CLINIC | Age: 85
End: 2025-05-21
Payer: MEDICARE

## 2025-05-21 VITALS
DIASTOLIC BLOOD PRESSURE: 64 MMHG | SYSTOLIC BLOOD PRESSURE: 118 MMHG | WEIGHT: 184 LBS | HEIGHT: 64 IN | BODY MASS INDEX: 31.41 KG/M2

## 2025-05-21 DIAGNOSIS — E78.2 MIXED HYPERLIPIDEMIA: ICD-10-CM

## 2025-05-21 DIAGNOSIS — J32.1 CHRONIC FRONTAL SINUSITIS: ICD-10-CM

## 2025-05-21 DIAGNOSIS — G44.221 CHRONIC TENSION-TYPE HEADACHE, INTRACTABLE: ICD-10-CM

## 2025-05-21 DIAGNOSIS — F51.01 PRIMARY INSOMNIA: ICD-10-CM

## 2025-05-21 DIAGNOSIS — R73.03 PRE-DIABETES: Primary | ICD-10-CM

## 2025-05-21 DIAGNOSIS — F32.A DEPRESSION, UNSPECIFIED DEPRESSION TYPE: ICD-10-CM

## 2025-05-21 DIAGNOSIS — R53.82 CHRONIC FATIGUE: ICD-10-CM

## 2025-05-21 LAB
ALBUMIN SERPL-MCNC: 3.8 G/DL (ref 3.2–4.6)
ALBUMIN/GLOB SERPL: 1.4 (ref 1–1.9)
ALP SERPL-CCNC: 62 U/L (ref 35–104)
ALT SERPL-CCNC: 19 U/L (ref 8–45)
ANION GAP SERPL CALC-SCNC: 10 MMOL/L (ref 7–16)
AST SERPL-CCNC: 16 U/L (ref 15–37)
BASOPHILS # BLD: 0.05 K/UL (ref 0–0.2)
BASOPHILS NFR BLD: 0.7 % (ref 0–2)
BILIRUB SERPL-MCNC: 0.3 MG/DL (ref 0–1.2)
BUN SERPL-MCNC: 14 MG/DL (ref 8–23)
CALCIUM SERPL-MCNC: 9.8 MG/DL (ref 8.8–10.2)
CHLORIDE SERPL-SCNC: 103 MMOL/L (ref 98–107)
CHOLEST SERPL-MCNC: 178 MG/DL (ref 0–200)
CO2 SERPL-SCNC: 26 MMOL/L (ref 20–29)
CREAT SERPL-MCNC: 0.72 MG/DL (ref 0.6–1.1)
DIFFERENTIAL METHOD BLD: ABNORMAL
EOSINOPHIL # BLD: 0.14 K/UL (ref 0–0.8)
EOSINOPHIL NFR BLD: 1.8 % (ref 0.5–7.8)
ERYTHROCYTE [DISTWIDTH] IN BLOOD BY AUTOMATED COUNT: 13.8 % (ref 11.9–14.6)
EST. AVERAGE GLUCOSE BLD GHB EST-MCNC: 110 MG/DL
GLOBULIN SER CALC-MCNC: 2.7 G/DL (ref 2.3–3.5)
GLUCOSE SERPL-MCNC: 95 MG/DL (ref 70–99)
HBA1C MFR BLD: 5.5 % (ref 0–5.6)
HCT VFR BLD AUTO: 42.4 % (ref 35.8–46.3)
HDLC SERPL-MCNC: 45 MG/DL (ref 40–60)
HDLC SERPL: 3.9 (ref 0–5)
HGB BLD-MCNC: 13.7 G/DL (ref 11.7–15.4)
IMM GRANULOCYTES # BLD AUTO: 0.02 K/UL (ref 0–0.5)
IMM GRANULOCYTES NFR BLD AUTO: 0.3 % (ref 0–5)
LDLC SERPL CALC-MCNC: 108 MG/DL (ref 0–100)
LYMPHOCYTES # BLD: 4.55 K/UL (ref 0.5–4.6)
LYMPHOCYTES NFR BLD: 59.9 % (ref 13–44)
MCH RBC QN AUTO: 30.9 PG (ref 26.1–32.9)
MCHC RBC AUTO-ENTMCNC: 32.3 G/DL (ref 31.4–35)
MCV RBC AUTO: 95.5 FL (ref 82–102)
MONOCYTES # BLD: 0.43 K/UL (ref 0.1–1.3)
MONOCYTES NFR BLD: 5.7 % (ref 4–12)
NEUTS SEG # BLD: 2.41 K/UL (ref 1.7–8.2)
NEUTS SEG NFR BLD: 31.6 % (ref 43–78)
NRBC # BLD: 0 K/UL (ref 0–0.2)
PLATELET # BLD AUTO: 264 K/UL (ref 150–450)
PMV BLD AUTO: 11.1 FL (ref 9.4–12.3)
POTASSIUM SERPL-SCNC: 4.7 MMOL/L (ref 3.5–5.1)
PROT SERPL-MCNC: 6.5 G/DL (ref 6.3–8.2)
RBC # BLD AUTO: 4.44 M/UL (ref 4.05–5.2)
SODIUM SERPL-SCNC: 139 MMOL/L (ref 136–145)
TRIGL SERPL-MCNC: 122 MG/DL (ref 0–150)
TSH, 3RD GENERATION: 1.68 UIU/ML (ref 0.27–4.2)
VLDLC SERPL CALC-MCNC: 24 MG/DL (ref 6–23)
WBC # BLD AUTO: 7.6 K/UL (ref 4.3–11.1)

## 2025-05-21 PROCEDURE — G8427 DOCREV CUR MEDS BY ELIG CLIN: HCPCS | Performed by: FAMILY MEDICINE

## 2025-05-21 PROCEDURE — 99214 OFFICE O/P EST MOD 30 MIN: CPT | Performed by: FAMILY MEDICINE

## 2025-05-21 PROCEDURE — 1036F TOBACCO NON-USER: CPT | Performed by: FAMILY MEDICINE

## 2025-05-21 PROCEDURE — G8417 CALC BMI ABV UP PARAM F/U: HCPCS | Performed by: FAMILY MEDICINE

## 2025-05-21 PROCEDURE — 1123F ACP DISCUSS/DSCN MKR DOCD: CPT | Performed by: FAMILY MEDICINE

## 2025-05-21 PROCEDURE — 1159F MED LIST DOCD IN RCRD: CPT | Performed by: FAMILY MEDICINE

## 2025-05-21 PROCEDURE — 1160F RVW MEDS BY RX/DR IN RCRD: CPT | Performed by: FAMILY MEDICINE

## 2025-05-21 PROCEDURE — G8400 PT W/DXA NO RESULTS DOC: HCPCS | Performed by: FAMILY MEDICINE

## 2025-05-21 PROCEDURE — 1090F PRES/ABSN URINE INCON ASSESS: CPT | Performed by: FAMILY MEDICINE

## 2025-05-21 RX ORDER — AZELASTINE 1 MG/ML
1 SPRAY, METERED NASAL 2 TIMES DAILY
Qty: 60 ML | Refills: 3 | Status: SHIPPED | OUTPATIENT
Start: 2025-05-21

## 2025-05-21 RX ORDER — TEMAZEPAM 15 MG/1
15 CAPSULE ORAL NIGHTLY PRN
COMMUNITY
End: 2025-05-21 | Stop reason: SDUPTHER

## 2025-05-21 RX ORDER — TEMAZEPAM 15 MG/1
15 CAPSULE ORAL NIGHTLY PRN
Qty: 30 CAPSULE | Refills: 3 | Status: SHIPPED | OUTPATIENT
Start: 2025-05-21 | End: 2026-05-21

## 2025-05-21 RX ORDER — PRAVASTATIN SODIUM 40 MG
40 TABLET ORAL DAILY
Qty: 30 TABLET | Refills: 0 | Status: SHIPPED | OUTPATIENT
Start: 2025-05-21 | End: 2026-05-21

## 2025-05-21 ASSESSMENT — ENCOUNTER SYMPTOMS
BLOOD IN STOOL: 0
SINUS PRESSURE: 0
EYE REDNESS: 0
SINUS PAIN: 0
RHINORRHEA: 0
CHEST TIGHTNESS: 0
DIARRHEA: 0
COUGH: 0
STRIDOR: 0
FACIAL SWELLING: 0
EYE DISCHARGE: 0
CONSTIPATION: 0
NAUSEA: 0
BACK PAIN: 0
WHEEZING: 0
EYE PAIN: 0
COLOR CHANGE: 0
SORE THROAT: 0
ABDOMINAL DISTENTION: 0
VOMITING: 0
EYE ITCHING: 0
ABDOMINAL PAIN: 0
SHORTNESS OF BREATH: 0

## 2025-05-21 NOTE — PATIENT INSTRUCTIONS
[facility] [employer] [facility]*  (Call 211/[employer] if you need more resources.)    Medicaid Van: [employer]   They offer: Non-emergency medical transportation for Medicaid members and some Medicare Advantage plans  Contact: [phone]   Helpful Info: Must call for a ride at least 3 days before your appointment.  Call Monday- Friday 8:00am to 5:00pm. Transportation is available for MD appts, dialysis, x-rays, lab work, drug store, or other medical appointments.     [facility]  What they offer: Provides assistance and medical transport to adults 60 years and older.  Contact: [phone]    [facility]   What they offer: Charge a fee for transport (not free).  Contact: [phone]    [employer]   They Offer: Charge a fee for transport (not free).  Contact: [phone]

## 2025-05-21 NOTE — ASSESSMENT & PLAN NOTE
Stable. Continue pravastatin. Encouraged to reduce red meat, fried foods, fast foods, butter, mayonnaise and dairy products; increase daily aerobic exercise.

## 2025-05-21 NOTE — PROGRESS NOTES
sinus pain, sore throat and tinnitus.    Eyes:  Negative for pain, discharge, redness, itching and visual disturbance.   Respiratory:  Negative for cough, chest tightness, shortness of breath, wheezing and stridor.    Cardiovascular:  Negative for chest pain, palpitations and leg swelling.   Gastrointestinal:  Negative for abdominal distention, abdominal pain, blood in stool, constipation, diarrhea, nausea and vomiting.   Endocrine: Negative for cold intolerance, heat intolerance, polydipsia, polyphagia and polyuria.   Genitourinary:  Positive for dysuria. Negative for decreased urine volume, difficulty urinating, flank pain, frequency, hematuria and urgency.   Musculoskeletal:  Negative for arthralgias, back pain, gait problem, joint swelling, myalgias and neck pain.   Skin:  Negative for color change, pallor, rash and wound.   Neurological:  Positive for dizziness. Negative for tremors, seizures, syncope, facial asymmetry, speech difficulty, weakness, light-headedness, numbness and headaches.   Psychiatric/Behavioral:  Positive for depression. Negative for agitation, behavioral problems, confusion, hallucinations, self-injury, sleep disturbance and suicidal ideas. The patient has insomnia. The patient is not nervous/anxious.         History:  Past Medical History:   Diagnosis Date    High cholesterol     Hypothyroidism     Indigestion     Vertigo        Past Surgical History:   Procedure Laterality Date    CATARACT EXTRACTION      HYSTERECTOMY (CERVIX STATUS UNKNOWN)      REPAIR RETINAL TEAR/HOLE Right     STOMACH SURGERY      took half of stomach    TOTAL KNEE ARTHROPLASTY Right        History reviewed. No pertinent family history.    Social History     Tobacco Use    Smoking status: Former     Types: Cigarettes    Smokeless tobacco: Never   Substance Use Topics    Alcohol use: Never       Allergies   Allergen Reactions    Morphine Nausea And Vomiting    Penicillins Nausea And Vomiting       Current Outpatient

## 2025-05-27 ENCOUNTER — RESULTS FOLLOW-UP (OUTPATIENT)
Dept: FAMILY MEDICINE CLINIC | Facility: CLINIC | Age: 85
End: 2025-05-27

## 2025-07-21 DIAGNOSIS — E78.2 MIXED HYPERLIPIDEMIA: ICD-10-CM

## 2025-07-21 DIAGNOSIS — G44.221 CHRONIC TENSION-TYPE HEADACHE, INTRACTABLE: ICD-10-CM

## 2025-07-21 RX ORDER — PRAVASTATIN SODIUM 40 MG
40 TABLET ORAL DAILY
Qty: 90 TABLET | Refills: 1 | Status: SHIPPED | OUTPATIENT
Start: 2025-07-21 | End: 2026-07-21

## 2025-07-21 RX ORDER — BUTALBITAL, ACETAMINOPHEN AND CAFFEINE 50; 325; 40 MG/1; MG/1; MG/1
1 TABLET ORAL EVERY 6 HOURS PRN
Qty: 30 TABLET | Refills: 0 | Status: SHIPPED | OUTPATIENT
Start: 2025-07-21 | End: 2025-07-23 | Stop reason: SDUPTHER

## 2025-07-23 DIAGNOSIS — G44.221 CHRONIC TENSION-TYPE HEADACHE, INTRACTABLE: ICD-10-CM

## 2025-07-23 RX ORDER — BUTALBITAL, ACETAMINOPHEN AND CAFFEINE 50; 325; 40 MG/1; MG/1; MG/1
1 TABLET ORAL EVERY 6 HOURS PRN
Qty: 30 TABLET | Refills: 0 | Status: SHIPPED | OUTPATIENT
Start: 2025-07-23

## 2025-07-23 NOTE — TELEPHONE ENCOUNTER
Pharmacist called to report that the RX for the Fioricet that was sent in on 7/21 was delivered to the wrong address and now they need a new RX sent in again.

## 2025-08-21 ENCOUNTER — OFFICE VISIT (OUTPATIENT)
Dept: FAMILY MEDICINE CLINIC | Facility: CLINIC | Age: 85
End: 2025-08-21

## 2025-08-21 VITALS
DIASTOLIC BLOOD PRESSURE: 78 MMHG | WEIGHT: 181 LBS | SYSTOLIC BLOOD PRESSURE: 128 MMHG | BODY MASS INDEX: 30.9 KG/M2 | HEIGHT: 64 IN

## 2025-08-21 DIAGNOSIS — G44.221 CHRONIC TENSION-TYPE HEADACHE, INTRACTABLE: Primary | ICD-10-CM

## 2025-08-21 DIAGNOSIS — E78.2 MIXED HYPERLIPIDEMIA: ICD-10-CM

## 2025-08-21 DIAGNOSIS — Z00.00 ENCOUNTER FOR ANNUAL PHYSICAL EXAM: ICD-10-CM

## 2025-08-21 DIAGNOSIS — F51.01 PRIMARY INSOMNIA: ICD-10-CM

## 2025-08-21 DIAGNOSIS — R41.3 MEMORY LOSS: ICD-10-CM

## 2025-08-21 DIAGNOSIS — R73.03 PRE-DIABETES: ICD-10-CM

## 2025-08-21 DIAGNOSIS — J32.1 CHRONIC FRONTAL SINUSITIS: ICD-10-CM

## 2025-08-21 RX ORDER — AZELASTINE 1 MG/ML
1 SPRAY, METERED NASAL 2 TIMES DAILY
Qty: 60 ML | Refills: 3 | Status: SHIPPED | OUTPATIENT
Start: 2025-08-21

## 2025-08-21 RX ORDER — BUTALBITAL, ACETAMINOPHEN AND CAFFEINE 50; 325; 40 MG/1; MG/1; MG/1
1 TABLET ORAL EVERY 6 HOURS PRN
Qty: 30 TABLET | Refills: 2 | Status: SHIPPED | OUTPATIENT
Start: 2025-08-21

## 2025-08-21 RX ORDER — DONEPEZIL HYDROCHLORIDE 5 MG/1
5 TABLET, FILM COATED ORAL NIGHTLY
Qty: 90 TABLET | Refills: 3 | Status: SHIPPED | OUTPATIENT
Start: 2025-08-21

## 2025-08-21 ASSESSMENT — ENCOUNTER SYMPTOMS
VOMITING: 0
RHINORRHEA: 0
SINUS PAIN: 0
NAUSEA: 0
SORE THROAT: 0
COLOR CHANGE: 0
EYE ITCHING: 0
COUGH: 0
WHEEZING: 0
SINUS PRESSURE: 0
ABDOMINAL PAIN: 0
BACK PAIN: 0
CONSTIPATION: 0
EYE REDNESS: 0
DIARRHEA: 0
ABDOMINAL DISTENTION: 0
STRIDOR: 0
BLOOD IN STOOL: 0
EYE PAIN: 0
CHEST TIGHTNESS: 0
FACIAL SWELLING: 0
SHORTNESS OF BREATH: 0
EYE DISCHARGE: 0

## (undated) DEVICE — INTENDED FOR TISSUE SEPARATION, AND OTHER PROCEDURES THAT REQUIRE A SHARP SURGICAL BLADE TO PUNCTURE OR CUT.: Brand: BARD-PARKER SAFETY BLADES SIZE 10, STERILE

## (undated) DEVICE — SUTURE VCRL SZ 3-0 L27IN ABSRB VLT L26MM SH 1/2 CIR J316H

## (undated) DEVICE — CURVED, LARGE JAW, OPEN SEALER/DIVIDER NANO-COATED: Brand: LIGASURE IMPACT

## (undated) DEVICE — SUTURE PERMAHAND SZ 3-0 L18IN NONABSORBABLE BLK L26MM SH C013D

## (undated) DEVICE — SOLUTION IV 1000ML 0.9% SOD CHL

## (undated) DEVICE — Device

## (undated) DEVICE — BUTTON SWITCH PENCIL BLADE ELECTRODE, HOLSTER: Brand: EDGE

## (undated) DEVICE — PAD,NON-ADHERENT,3X8,STERILE,LF,1/PK: Brand: MEDLINE

## (undated) DEVICE — MEDI-VAC NON-CONDUCTIVE SUCTION TUBING: Brand: CARDINAL HEALTH

## (undated) DEVICE — RELOAD STPL L55MM OPN H3.8MM CLS H1.5MM WIRE DIA0.2MM REG

## (undated) DEVICE — SURGICAL PROCEDURE PACK BASIC ST FRANCIS

## (undated) DEVICE — SUTURE PDS II SZ 1 L96IN ABSRB VLT TP-1 L65MM 1/2 CIR Z880G

## (undated) DEVICE — DUAL LUMEN STOMACH TUBE: Brand: SALEM SUMP

## (undated) DEVICE — APPLIER CLP AUTO MED 9.75 IN TI SURGCLP SUPER INTLOK 20 DISP

## (undated) DEVICE — SUTURE PERMAHAND SZ 3-0 L30IN NONABSORBABLE BLK SILK BRAID A304H

## (undated) DEVICE — SUTURE PERMAHAND SZ 2-0 L30IN NONABSORBABLE BLK SILK W/O A305H

## (undated) DEVICE — BLADE ELECTRODE: Brand: EDGE

## (undated) DEVICE — INTENDED FOR TISSUE SEPARATION, AND OTHER PROCEDURES THAT REQUIRE A SHARP SURGICAL BLADE TO PUNCTURE OR CUT.: Brand: BARD-PARKER SAFETY BLADES SIZE 15, STERILE

## (undated) DEVICE — SUTURE VCRL SZ 3-0 L18IN ABSRB UD L26MM SH 1/2 CIR J864D

## (undated) DEVICE — SHEET, T, LAPAROTOMY, STERILE: Brand: MEDLINE

## (undated) DEVICE — BASIC SINGLE BASIN BTC-LF: Brand: MEDLINE INDUSTRIES, INC.

## (undated) DEVICE — STAPLER INT L60MM STD TISS BLU 7/8 FIRING W/ 3.5MM 21 TI

## (undated) DEVICE — TRAY PREP DRY W/ PREM GLV 2 APPL 6 SPNG 2 UNDPD 1 OVERWRAP

## (undated) DEVICE — TRAY CATH 16F URIN MTR LTX -- CONVERT TO ITEM 363111

## (undated) DEVICE — AGENT HEMSTAT W2XL14IN OXIDIZED REGENERATED CELOS ABSRB FOR

## (undated) DEVICE — BLADE ASSEMB CLP HAIR FINE --

## (undated) DEVICE — DRAIN WND RND 19FR FULL FLTD --

## (undated) DEVICE — STAPLER INT L55MM CUT LN L53MM STPL LN L57MM BLU B FRM 8

## (undated) DEVICE — 3M™ TEGADERM™ TRANSPARENT FILM DRESSING FRAME STYLE, 1626W, 4 IN X 4-3/4 IN (10 CM X 12 CM), 50/CT 4CT/CASE: Brand: 3M™ TEGADERM™

## (undated) DEVICE — SLIM BODY SKIN STAPLER: Brand: APPOSE ULC

## (undated) DEVICE — SPONGE LAP 18X18IN STRL -- 5/PK

## (undated) DEVICE — MEDI-VAC YANKAUER SUCTION HANDLE W/BULBOUS TIP: Brand: CARDINAL HEALTH

## (undated) DEVICE — SUTURE PERMAHAND SZ 2-0 L12X18IN NONABSORBABLE BLK SILK A185H

## (undated) DEVICE — SUT SLK 2-0SH 30IN BLK --

## (undated) DEVICE — SUTURE PERMAHAND SZ 0 L30IN NONABSORBABLE BLK SILK BRAID A306H

## (undated) DEVICE — AMD ANTIMICROBIAL GAUZE SPONGES,12 PLY USP TYPE VII, 0.2% POLYHEXAMETHYLENE BIGUANIDE HCI (PHMB): Brand: CURITY

## (undated) DEVICE — 2000CC GUARDIAN II: Brand: GUARDIAN

## (undated) DEVICE — LOADING UNIT WITH DST SERIES TECHNOLOGY: Brand: GIA

## (undated) DEVICE — SUTURE ABSORBABLE BRAIDED 4-0 SH 27 IN COAT VIO VICRYL J783G

## (undated) DEVICE — DRAPE TWL SURG 16X26IN BLU ORB04] ALLCARE INC]

## (undated) DEVICE — STAPLER WITH DST SERIES TECHNOLOGY: Brand: GIA

## (undated) DEVICE — REM POLYHESIVE ADULT PATIENT RETURN ELECTRODE: Brand: VALLEYLAB

## (undated) DEVICE — SUT SLK 4-0 18IN TF BLK --

## (undated) DEVICE — SUTURE PERMAHAND SZ 2-0 L18IN NONABSORBABLE BLK L26MM SH C012D

## (undated) DEVICE — SPONGE: SPECIALTY PEANUT XR 100/CS: Brand: MEDICAL ACTION INDUSTRIES